# Patient Record
Sex: FEMALE | Race: BLACK OR AFRICAN AMERICAN | HISPANIC OR LATINO | Employment: UNEMPLOYED | URBAN - METROPOLITAN AREA
[De-identification: names, ages, dates, MRNs, and addresses within clinical notes are randomized per-mention and may not be internally consistent; named-entity substitution may affect disease eponyms.]

---
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
## 2023-04-24 ENCOUNTER — HOSPITAL ENCOUNTER (EMERGENCY)
Facility: HOSPITAL | Age: 18
Discharge: HOME | End: 2023-04-24
Attending: EMERGENCY MEDICINE | Admitting: EMERGENCY MEDICINE

## 2023-04-24 VITALS
DIASTOLIC BLOOD PRESSURE: 67 MMHG | OXYGEN SATURATION: 100 % | HEART RATE: 105 BPM | RESPIRATION RATE: 16 BRPM | TEMPERATURE: 98 F | SYSTOLIC BLOOD PRESSURE: 126 MMHG

## 2023-04-24 DIAGNOSIS — A60.00 RECURRENT GENITAL HERPES: Primary | ICD-10-CM

## 2023-04-24 LAB
EXT PREGNANCY TEST URINE: NEGATIVE
EXT. CONTROL: NORMAL

## 2023-04-24 RX ORDER — VALACYCLOVIR HYDROCHLORIDE 500 MG/1
500 TABLET, FILM COATED ORAL ONCE
Status: COMPLETED | OUTPATIENT
Start: 2023-04-24 | End: 2023-04-24

## 2023-04-24 RX ORDER — VALACYCLOVIR HYDROCHLORIDE 500 MG/1
500 TABLET, FILM COATED ORAL 2 TIMES DAILY
Qty: 10 TABLET | Refills: 3 | Status: SHIPPED | OUTPATIENT
Start: 2023-04-24 | End: 2023-04-29

## 2023-04-24 RX ADMIN — VALACYCLOVIR HYDROCHLORIDE 500 MG: 500 TABLET, FILM COATED ORAL at 20:03

## 2023-04-24 NOTE — DISCHARGE INSTRUCTIONS
Take the medicine as prescribed  Tylenol/advil/cool compresses as needed for discomfort  Avoid contact until fully healed and asympatomatic  This can recur so I gave you refills on the prescription

## 2023-04-25 NOTE — ED PROVIDER NOTES
History  Chief Complaint   Patient presents with   • Exposure to STD     States has an outbreak  Poss herpes  Here with significant other who has hx of same     20 yo female with history of genital herpes c/o an outbreak x one day  Pain mild  No associated symptoms  Needs prescription  History provided by:  Patient   used: No    Exposure to STD  Associated symptoms: rash    Associated symptoms: no cough, no diarrhea, no fever and no vomiting        Prior to Admission Medications   Prescriptions Last Dose Informant Patient Reported? Taking?   famotidine (PEPCID) 20 mg tablet   No No   Sig: Take 1 tablet (20 mg total) by mouth 2 (two) times a day for 14 days   ibuprofen (MOTRIN) 600 mg tablet   No No   Sig: Take 1 tablet (600 mg total) by mouth every 6 (six) hours as needed for mild pain   norethindrone-ethinyl estradiol (JUNEL FE 1/20) 1-20 MG-MCG per tablet   Yes No   Sig: Take 1 tablet by mouth daily   ondansetron (ZOFRAN-ODT) 4 mg disintegrating tablet   No No   Sig: Take 1 tablet (4 mg total) by mouth every 6 (six) hours as needed for nausea for up to 15 doses   polyethylene glycol (GLYCOLAX) 17 GM/SCOOP powder   No No   Sig: Take 17 g by mouth daily Use as directed   Patient not taking: Reported on 11/29/2021    polyethylene glycol (MIRALAX) 17 g packet   No No   Sig: Take 17 g by mouth daily for 7 days      Facility-Administered Medications: None       Past Medical History:   Diagnosis Date   • Abnormal body odor    • Constipation    • Dysmenorrhea    • Hirsutism    • Ovarian cyst        History reviewed  No pertinent surgical history  Family History   Problem Relation Age of Onset   • No Known Problems Mother      I have reviewed and agree with the history as documented      E-Cigarette/Vaping   • E-Cigarette Use Never User      E-Cigarette/Vaping Substances     Social History     Tobacco Use   • Smoking status: Never   • Smokeless tobacco: Never   Vaping Use   • Vaping Use: Never used   Substance Use Topics   • Alcohol use: Not Currently   • Drug use: Not Currently     Types: Marijuana       Review of Systems   Constitutional: Negative for fever  Respiratory: Negative for cough  Gastrointestinal: Negative for diarrhea and vomiting  Skin: Positive for rash  Physical Exam  Physical Exam  Vitals and nursing note reviewed  Constitutional:       General: She is not in acute distress  Appearance: She is not ill-appearing  Pulmonary:      Effort: Pulmonary effort is normal  No respiratory distress  Musculoskeletal:      Cervical back: Normal range of motion and neck supple  Neurological:      General: No focal deficit present  Mental Status: She is alert and oriented to person, place, and time  Psychiatric:         Mood and Affect: Mood normal          Behavior: Behavior normal          Vital Signs  ED Triage Vitals [04/24/23 1900]   Temperature Pulse Respirations Blood Pressure SpO2   98 °F (36 7 °C) 105 16 126/67 100 %      Temp Source Heart Rate Source Patient Position - Orthostatic VS BP Location FiO2 (%)   Tympanic Monitor Sitting Right arm --      Pain Score       3           Vitals:    04/24/23 1900   BP: 126/67   Pulse: 105   Patient Position - Orthostatic VS: Sitting         Visual Acuity      ED Medications  Medications   valACYclovir (VALTREX) tablet 500 mg (500 mg Oral Given 4/2003)       Diagnostic Studies  Results Reviewed     Procedure Component Value Units Date/Time    POCT pregnancy, urine [[id number]]  (Normal) Resulted: 04/24/23 1959    Lab Status: Final result Updated: 04/24/23 1959     EXT Preg Test, Ur Negative     Control Valid                 No orders to display              Procedures  Procedures         ED Course         CRAFFT    Flowsheet Row Most Recent Value   CRAFFT Initial Screen: During the past 12 months, did you:    1  Drink any alcohol (more than a few sips)? No Filed at: 04/24/2023 1904   2   Smoke any marijuana or hashish "No Filed at: 04/24/2023 1904   3  Use anything else to get high? (\"anything else\" includes illegal drugs, over the counter and prescription drugs, and things that you sniff or 'simon')? No Filed at: 04/24/2023 1904                                          Medical Decision Making  Note: pt  Did not feel I needed to examine genital area  External records reviewed and confirmed pt  And her partner do have documented history of herpes  Will treat with valtrex  Advised avoid contact, tylenol/advil/cool compresses prn  Amount and/or Complexity of Data Reviewed  Labs: ordered  Risk  Prescription drug management  Disposition  Final diagnoses:   Recurrent genital herpes     Time reflects when diagnosis was documented in both MDM as applicable and the Disposition within this note     Time User Action Codes Description Comment    5/07/5862  0:59 PM Pat Farooqs A Add [U16 49] Recurrent genital herpes       ED Disposition     ED Disposition   Discharge    Condition   Stable    Date/Time   Mon Apr 24, 2023  7:57 PM    Comment   Rubio Broderick discharge to home/self care  Follow-up Information     Follow up With Specialties Details Why Elen Salcido [address], [address] Nurse Practitioner  As needed [address] [place], [place] [id number]            Patient's Medications   Discharge Prescriptions    VALACYCLOVIR (VALTREX) 500 MG TABLET    Take 1 tablet (500 mg total) by mouth 2 (two) times a day for 5 days       Start Date: 4/24/2023 End Date: 4/29/2023       Order Dose: 500 mg       Quantity: 10 tablet    Refills: 3       No discharge procedures on file      PDMP Review     None          ED Provider  Electronically Signed by           Noreen Gonzalez MD  00/89/46 3465    "

## 2023-05-10 ENCOUNTER — TELEPHONE (OUTPATIENT)
Dept: INTERNAL MEDICINE CLINIC | Facility: CLINIC | Age: 18
End: 2023-05-10

## 2023-05-10 NOTE — TELEPHONE ENCOUNTER
Pt called asking for a letter to certify who she is. Stated she needs it for social security. Stated she needs it signed. I did the note as per our conversation.

## 2023-09-09 ENCOUNTER — APPOINTMENT (EMERGENCY)
Dept: ULTRASOUND IMAGING | Facility: HOSPITAL | Age: 18
End: 2023-09-09
Payer: COMMERCIAL

## 2023-09-09 ENCOUNTER — HOSPITAL ENCOUNTER (EMERGENCY)
Facility: HOSPITAL | Age: 18
Discharge: HOME | End: 2023-09-09
Attending: EMERGENCY MEDICINE
Payer: COMMERCIAL

## 2023-09-09 VITALS
TEMPERATURE: 98.8 F | RESPIRATION RATE: 16 BRPM | SYSTOLIC BLOOD PRESSURE: 124 MMHG | HEART RATE: 71 BPM | DIASTOLIC BLOOD PRESSURE: 78 MMHG | OXYGEN SATURATION: 100 %

## 2023-09-09 DIAGNOSIS — O20.0 THREATENED MISCARRIAGE: Primary | ICD-10-CM

## 2023-09-09 DIAGNOSIS — E87.6 HYPOKALEMIA: ICD-10-CM

## 2023-09-09 DIAGNOSIS — E83.42 HYPOMAGNESEMIA: ICD-10-CM

## 2023-09-09 LAB
ABO GROUP BLD: NORMAL
ALBUMIN SERPL BCP-MCNC: 4.4 G/DL (ref 3.5–5)
ALP SERPL-CCNC: 62 U/L (ref 34–104)
ALT SERPL W P-5'-P-CCNC: 12 U/L (ref 7–52)
ANION GAP SERPL CALCULATED.3IONS-SCNC: 7 MMOL/L
AST SERPL W P-5'-P-CCNC: 19 U/L (ref 13–39)
B-HCG SERPL-ACNC: ABNORMAL MIU/ML (ref 0–5)
BASOPHILS # BLD AUTO: 0.01 THOUSANDS/ÂΜL (ref 0–0.1)
BASOPHILS NFR BLD AUTO: 0 % (ref 0–1)
BILIRUB SERPL-MCNC: 0.45 MG/DL (ref 0.2–1)
BILIRUB UR QL STRIP: NEGATIVE
BLD GP AB SCN SERPL QL: NEGATIVE
BUN SERPL-MCNC: 7 MG/DL (ref 5–25)
CALCIUM SERPL-MCNC: 9.2 MG/DL (ref 8.4–10.2)
CHLORIDE SERPL-SCNC: 105 MMOL/L (ref 96–108)
CLARITY UR: CLEAR
CO2 SERPL-SCNC: 25 MMOL/L (ref 21–32)
COLOR UR: YELLOW
CREAT SERPL-MCNC: 0.53 MG/DL (ref 0.6–1.3)
EOSINOPHIL # BLD AUTO: 0.03 THOUSAND/ÂΜL (ref 0–0.61)
EOSINOPHIL NFR BLD AUTO: 1 % (ref 0–6)
ERYTHROCYTE [DISTWIDTH] IN BLOOD BY AUTOMATED COUNT: 11.3 % (ref 11.6–15.1)
GFR SERPL CREATININE-BSD FRML MDRD: 138 ML/MIN/1.73SQ M
GLUCOSE SERPL-MCNC: 84 MG/DL (ref 65–140)
GLUCOSE UR STRIP-MCNC: NEGATIVE MG/DL
HCT VFR BLD AUTO: 30.9 % (ref 34.8–46.1)
HGB BLD-MCNC: 10.7 G/DL (ref 11.5–15.4)
HGB UR QL STRIP.AUTO: NEGATIVE
IMM GRANULOCYTES # BLD AUTO: 0.02 THOUSAND/UL (ref 0–0.2)
IMM GRANULOCYTES NFR BLD AUTO: 0 % (ref 0–2)
KETONES UR STRIP-MCNC: ABNORMAL MG/DL
LEUKOCYTE ESTERASE UR QL STRIP: NEGATIVE
LYMPHOCYTES # BLD AUTO: 1.21 THOUSANDS/ÂΜL (ref 0.6–4.47)
LYMPHOCYTES NFR BLD AUTO: 25 % (ref 14–44)
MAGNESIUM SERPL-MCNC: 1.8 MG/DL (ref 1.9–2.7)
MCH RBC QN AUTO: 29.6 PG (ref 26.8–34.3)
MCHC RBC AUTO-ENTMCNC: 34.6 G/DL (ref 31.4–37.4)
MCV RBC AUTO: 86 FL (ref 82–98)
MONOCYTES # BLD AUTO: 0.45 THOUSAND/ÂΜL (ref 0.17–1.22)
MONOCYTES NFR BLD AUTO: 9 % (ref 4–12)
NEUTROPHILS # BLD AUTO: 3.06 THOUSANDS/ÂΜL (ref 1.85–7.62)
NEUTS SEG NFR BLD AUTO: 65 % (ref 43–75)
NITRITE UR QL STRIP: NEGATIVE
NRBC BLD AUTO-RTO: 0 /100 WBCS
PH UR STRIP.AUTO: 6 [PH]
PLATELET # BLD AUTO: 349 THOUSANDS/UL (ref 149–390)
PMV BLD AUTO: 9 FL (ref 8.9–12.7)
POTASSIUM SERPL-SCNC: 3.2 MMOL/L (ref 3.5–5.3)
PROT SERPL-MCNC: 7.2 G/DL (ref 6.4–8.4)
PROT UR STRIP-MCNC: NEGATIVE MG/DL
RBC # BLD AUTO: 3.61 MILLION/UL (ref 3.81–5.12)
RH BLD: POSITIVE
SODIUM SERPL-SCNC: 137 MMOL/L (ref 135–147)
SP GR UR STRIP.AUTO: >=1.03 (ref 1–1.03)
SPECIMEN EXPIRATION DATE: NORMAL
UROBILINOGEN UR QL STRIP.AUTO: 0.2 E.U./DL
WBC # BLD AUTO: 4.78 THOUSAND/UL (ref 4.31–10.16)

## 2023-09-09 PROCEDURE — 85025 COMPLETE CBC W/AUTO DIFF WBC: CPT | Performed by: EMERGENCY MEDICINE

## 2023-09-09 PROCEDURE — 84702 CHORIONIC GONADOTROPIN TEST: CPT | Performed by: EMERGENCY MEDICINE

## 2023-09-09 PROCEDURE — 99285 EMERGENCY DEPT VISIT HI MDM: CPT | Performed by: EMERGENCY MEDICINE

## 2023-09-09 PROCEDURE — 86900 BLOOD TYPING SEROLOGIC ABO: CPT | Performed by: EMERGENCY MEDICINE

## 2023-09-09 PROCEDURE — 80053 COMPREHEN METABOLIC PANEL: CPT | Performed by: EMERGENCY MEDICINE

## 2023-09-09 PROCEDURE — 99284 EMERGENCY DEPT VISIT MOD MDM: CPT

## 2023-09-09 PROCEDURE — 76801 OB US < 14 WKS SINGLE FETUS: CPT

## 2023-09-09 PROCEDURE — 36415 COLL VENOUS BLD VENIPUNCTURE: CPT | Performed by: EMERGENCY MEDICINE

## 2023-09-09 PROCEDURE — 86901 BLOOD TYPING SEROLOGIC RH(D): CPT | Performed by: EMERGENCY MEDICINE

## 2023-09-09 PROCEDURE — 81003 URINALYSIS AUTO W/O SCOPE: CPT | Performed by: EMERGENCY MEDICINE

## 2023-09-09 PROCEDURE — 83735 ASSAY OF MAGNESIUM: CPT | Performed by: EMERGENCY MEDICINE

## 2023-09-09 PROCEDURE — 93005 ELECTROCARDIOGRAM TRACING: CPT

## 2023-09-09 PROCEDURE — 86850 RBC ANTIBODY SCREEN: CPT | Performed by: EMERGENCY MEDICINE

## 2023-09-09 RX ORDER — POTASSIUM CHLORIDE 20 MEQ/1
40 TABLET, EXTENDED RELEASE ORAL ONCE
Status: COMPLETED | OUTPATIENT
Start: 2023-09-09 | End: 2023-09-09

## 2023-09-09 RX ORDER — LANOLIN ALCOHOL/MO/W.PET/CERES
400 CREAM (GRAM) TOPICAL ONCE
Status: COMPLETED | OUTPATIENT
Start: 2023-09-09 | End: 2023-09-09

## 2023-09-09 RX ADMIN — POTASSIUM CHLORIDE 40 MEQ: 1500 TABLET, EXTENDED RELEASE ORAL at 07:59

## 2023-09-09 RX ADMIN — Medication 400 MG: at 07:59

## 2023-09-09 NOTE — ED PROVIDER NOTES
History  Chief Complaint   Patient presents with   • Vaginal Bleeding - Pregnant     Pt presents to the ED c/o vaginal bleeding for 2 days. Pt states that she is 10 weeks pregnant     75-year-old female  presenting for abdominal pain and vaginal bleeding. Patient estimates that she is approximately 9 weeks pregnant based on her LMP. She had 2 positive home pregnancy tests. She has not yet seen OBGYN or had an US performed. She has had lower abdominal cramping for the past 2 months persistently. She has now developed vaginal bleeding for the past 2 days. She reports going through one pad and liner per day. She felt lightheaded this morning prompting her to seek evaluation. She denies any nausea, vomiting, diarrhea, fevers, urinary symptoms. Prior to Admission Medications   Prescriptions Last Dose Informant Patient Reported?  Taking?   famotidine (PEPCID) 20 mg tablet   No No   Sig: Take 1 tablet (20 mg total) by mouth 2 (two) times a day for 14 days   ibuprofen (MOTRIN) 600 mg tablet   No No   Sig: Take 1 tablet (600 mg total) by mouth every 6 (six) hours as needed for mild pain   norethindrone-ethinyl estradiol (JUNEL ) 1-20 MG-MCG per tablet   Yes No   Sig: Take 1 tablet by mouth daily   ondansetron (ZOFRAN-ODT) 4 mg disintegrating tablet   No No   Sig: Take 1 tablet (4 mg total) by mouth every 6 (six) hours as needed for nausea for up to 15 doses   polyethylene glycol (GLYCOLAX) 17 GM/SCOOP powder   No No   Sig: Take 17 g by mouth daily Use as directed   Patient not taking: Reported on 2021    polyethylene glycol (MIRALAX) 17 g packet   No No   Sig: Take 17 g by mouth daily for 7 days   valACYclovir (VALTREX) 500 mg tablet   No No   Sig: Take 1 tablet (500 mg total) by mouth 2 (two) times a day for 5 days      Facility-Administered Medications: None       Past Medical History:   Diagnosis Date   • Abnormal body odor    • Constipation    • Dysmenorrhea    • Hirsutism    • Ovarian cyst History reviewed. No pertinent surgical history. Family History   Problem Relation Age of Onset   • No Known Problems Mother      I have reviewed and agree with the history as documented. E-Cigarette/Vaping   • E-Cigarette Use Never User      E-Cigarette/Vaping Substances     Social History     Tobacco Use   • Smoking status: Never   • Smokeless tobacco: Never   Vaping Use   • Vaping Use: Never used   Substance Use Topics   • Alcohol use: Not Currently   • Drug use: Not Currently     Types: Marijuana       Review of Systems   Constitutional: Negative for chills and fever. Respiratory: Negative for shortness of breath. Cardiovascular: Negative for chest pain. Gastrointestinal: Positive for abdominal pain. Negative for constipation, diarrhea, nausea and vomiting. Genitourinary: Positive for vaginal bleeding. Negative for dysuria, flank pain, frequency and vaginal discharge. Musculoskeletal: Negative for gait problem. Skin: Negative for rash. Neurological: Positive for light-headedness. Negative for syncope and weakness. All other systems reviewed and are negative. Physical Exam  Physical Exam  Vitals and nursing note reviewed. Constitutional:       General: She is not in acute distress. Appearance: She is well-developed. She is not ill-appearing. HENT:      Head: Normocephalic and atraumatic. Nose: Nose normal.      Mouth/Throat:      Mouth: Mucous membranes are moist.   Eyes:      Conjunctiva/sclera: Conjunctivae normal.   Cardiovascular:      Rate and Rhythm: Normal rate and regular rhythm. Heart sounds: No murmur heard. No friction rub. No gallop. Pulmonary:      Effort: Pulmonary effort is normal.      Breath sounds: Normal breath sounds. No wheezing, rhonchi or rales. Abdominal:      General: There is no distension. Palpations: Abdomen is soft. Tenderness: There is no abdominal tenderness.    Musculoskeletal:         General: No swelling or tenderness. Normal range of motion. Cervical back: Normal range of motion and neck supple. Skin:     General: Skin is warm and dry. Coloration: Skin is not pale. Findings: No rash. Neurological:      General: No focal deficit present. Mental Status: She is alert and oriented to person, place, and time. Psychiatric:         Behavior: Behavior normal.         Vital Signs  ED Triage Vitals [09/09/23 0650]   Temperature Pulse Respirations Blood Pressure SpO2   98.8 °F (37.1 °C) (!) 115 16 124/91 100 %      Temp Source Heart Rate Source Patient Position - Orthostatic VS BP Location FiO2 (%)   Oral Monitor Sitting Left arm --      Pain Score       No Pain           Vitals:    09/09/23 0650 09/09/23 1020   BP: 124/91 124/78   Pulse: (!) 115 71   Patient Position - Orthostatic VS: Sitting Sitting         Visual Acuity      ED Medications  Medications   potassium chloride (K-DUR,KLOR-CON) CR tablet 40 mEq (40 mEq Oral Given 9/9/23 0759)   magnesium Oxide (MAG-OX) tablet 400 mg (400 mg Oral Given 9/9/23 0759)       Diagnostic Studies  Results Reviewed     Procedure Component Value Units Date/Time    Quantitative hCG [[id number]]  (Abnormal) Collected: 09/09/23 0719    Lab Status: Final result Specimen: Blood from Arm, Right Updated: 09/09/23 0818     HCG, [place] 10,208 mIU/mL     Narrative:       Expected Ranges:    HCG results between 5 and 25 mIU/mL may be indicative of early pregnancy but should be interpreted in light of the total clinical presentation. HCG can rise to detectable levels in tera and post menopausal women (0-11.6 mIU/mL).      Approximate               Approximate HCG  Gestation age          Concentration ( mIU/mL)  _____________          ______________________   [facility]                      HCG values  0.2-1                       5-50  1-2                           2-3                         100-5000  3-4                         [id number]  4-5 1000-89136  5-6                         [id number]  6-8                         [id number]  8-12                        [id number]      UA w Reflex to Microscopic w Reflex to Culture [[id number]]  (Abnormal) Collected: 09/09/23 0744    Lab Status: Final result Specimen: Urine, Clean Catch Updated: 09/09/23 0750     Color, UA Yellow     Clarity, UA Clear     Specific Gravity, UA >=1.030     pH, UA 6.0     Leukocytes, UA Negative     Nitrite, UA Negative     Protein, UA Negative mg/dl      Glucose, UA Negative mg/dl      Ketones, UA Trace mg/dl      Urobilinogen, UA 0.2 E.U./dl      Bilirubin, UA Negative     Occult Blood, UA Negative    Comprehensive metabolic panel [[id number]]  (Abnormal) Collected: 09/09/23 0719    Lab Status: Final result Specimen: Blood from Arm, Right Updated: 09/09/23 0746     Sodium 137 mmol/L      Potassium 3.2 mmol/L      Chloride 105 mmol/L      CO2 25 mmol/L      ANION GAP 7 mmol/L      BUN 7 mg/dL      Creatinine 0.53 mg/dL      Glucose 84 mg/dL      Calcium 9.2 mg/dL      AST 19 U/L      ALT 12 U/L      Alkaline Phosphatase 62 U/L      Total Protein 7.2 g/dL      Albumin 4.4 g/dL      Total Bilirubin 0.45 mg/dL      eGFR 138 ml/min/1.73sq m     Narrative:      [place] guidelines for Chronic Kidney Disease (CKD):   •  Stage 1 with normal or high GFR (GFR > 90 mL/min/1.73 square meters)  •  Stage 2 Mild CKD (GFR = 60-89 mL/min/1.73 square meters)  •  Stage 3A Moderate CKD (GFR = 45-59 mL/min/1.73 square meters)  •  Stage 3B Moderate CKD (GFR = 30-44 mL/min/1.73 square meters)  •  Stage 4 Severe CKD (GFR = 15-29 mL/min/1.73 square meters)  •  Stage 5 End Stage CKD (GFR <15 mL/min/1.73 square meters)  Note: GFR calculation is accurate only with a steady state creatinine    Magnesium [[id number]]  (Abnormal) Collected: 09/09/23 0719    Lab Status: Final result Specimen: Blood from Arm, Right Updated: 09/09/23 0746     Magnesium 1.8 mg/dL     CBC and differential [[id number]]  (Abnormal) Collected: 09/09/23 0719    Lab Status: Final result Specimen: Blood from Arm, Right Updated: 09/09/23 0726     WBC 4.78 Thousand/uL      RBC 3.61 Million/uL      Hemoglobin 10.7 g/dL      Hematocrit 30.9 %      MCV 86 fL      MCH 29.6 pg      MCHC 34.6 g/dL      RDW 11.3 %      MPV 9.0 fL      Platelets 525 Thousands/uL      nRBC 0 /100 WBCs      Neutrophils Relative 65 %      Immat GRANS % 0 %      Lymphocytes Relative 25 %      Monocytes Relative 9 %      Eosinophils Relative 1 %      Basophils Relative 0 %      Neutrophils Absolute 3.06 Thousands/µL      Immature Grans Absolute 0.02 Thousand/uL      Lymphocytes Absolute 1.21 Thousands/µL      Monocytes Absolute 0.45 Thousand/µL      Eosinophils Absolute 0.03 Thousand/µL      Basophils Absolute 0.01 Thousands/µL                  US OB < 14 weeks with transvaginal   Final Result by Maximino Huynh MD (09/09 1008)      Single live intrauterine gestation at   6  weeks  6   days . MONO of 04/28/2024. Workstation performed: [id number]                    Procedures  Procedures         ED Course  ED Course as of 09/09/23 1244   Sat Sep 09, 2023   0727 CBC and differential(!)   0730 Procedure Note: EKG  Date/Time: 09/09/23 7:19 AM   Interpreted by: Ronaldo Tavera  Indications / Diagnosis: lightheadedness  ECG reviewed by me, the ED Provider: yes   The EKG demonstrates:  Rhythm: rate 98, normal sinus  Intervals: normal intervals  Axis: normal axis  QRS/Blocks: normal QRS  ST Changes: No acute ST Changes, no STD/ELYSIA.    0748 Comprehensive metabolic panel(!)  Mild hypokalemia, will orally replete. 1786 Magnesium(!): 1.8  Will replete.    0750 UA w Reflex to Microscopic w Reflex to Culture(!)   0810 Type and screen  No need for Rhogam   0819 HCG QUANTITATIVE(!): 95,135                                             Medical Decision Making  28-year-old female presenting for evaluation of abdominal pain and vaginal bleeding in pregnancy. Differential diagnoses include but not limited to threatened miscarriage, menstruation, placenta previa, normal pregnancy, ectopic pregnancy, arrhythmia, UTI. Labs stable, mild hypokalemia and hypomagnesemia noted and orally repleted. Elevated hcg consistent with patient history. Blood type not requiring rhogam administration. UA without UTI. US showing single live intrauterine gestation. Patient is otherwise stable for discharge. She has an obgyn that she plans to follow up with. Prescription for repeat beta hcg in 48 hrs provided. Return precautions discussed. Hypokalemia: acute illness or injury  Hypomagnesemia: acute illness or injury  Threatened miscarriage: acute illness or injury  Amount and/or Complexity of Data Reviewed  Labs: ordered. Decision-making details documented in ED Course. Radiology: ordered. ECG/medicine tests: ordered and independent interpretation performed. Decision-making details documented in ED Course. Risk  OTC drugs. Prescription drug management. Disposition  Final diagnoses:   Threatened miscarriage   Hypokalemia   Hypomagnesemia     Time reflects when diagnosis was documented in both MDM as applicable and the Disposition within this note     Time User Action Codes Description Comment    2023 10:14 AM Glorya Root [O20.0] Threatened miscarriage     2023 10:15 AM Naomie Apgar Add [E87.6] Hypokalemia     2023 10:15 AM Naomie Apgar Add [E83.42] Hypomagnesemia       ED Disposition     ED Disposition   Discharge    Condition   Stable    Date/Time   Sat Sep 9, 2023 10:14 AM    Comment   Talita Cummins discharge to home/self care.                Follow-up Information     Follow up With Specialties Details Why [address] [facility], [address] [facility], Nurse Practitioner In 2 days  [address] [facility] [address]  [phone]            Discharge Medication List as of 2023 10:16 AM      CONTINUE these medications which have NOT CHANGED    Details   famotidine (PEPCID) 20 mg tablet Take 1 tablet (20 mg total) by mouth 2 (two) times a day for 14 days, Starting Fri 10/28/2022, Until Fri 11/11/2022, Normal      ibuprofen (MOTRIN) 600 mg tablet Take 1 tablet (600 mg total) by mouth every 6 (six) hours as needed for mild pain, Starting Wed 5/25/2022, Normal      norethindrone-ethinyl estradiol (JUNEL FE 1/20) 1-20 MG-MCG per tablet Take 1 tablet by mouth daily, Starting Tue 9/28/2021, Until Wed 9/28/2022, Historical Med      ondansetron (ZOFRAN-ODT) 4 mg disintegrating tablet Take 1 tablet (4 mg total) by mouth every 6 (six) hours as needed for nausea for up to 15 doses, Starting Fri 10/28/2022, Normal      polyethylene glycol (GLYCOLAX) 17 GM/SCOOP powder Take 17 g by mouth daily Use as directed, Starting u 5/13/2021, Normal      polyethylene glycol (MIRALAX) 17 g packet Take 17 g by mouth daily for 7 days, Starting Fri 10/28/2022, Until Fri 11/4/2022, Normal      valACYclovir (VALTREX) 500 mg tablet Take 1 tablet (500 mg total) by mouth 2 (two) times a day for 5 days, Starting Mon 4/24/2023, Until Sat 4/29/2023, Normal             Outpatient Discharge Orders   hCG, quantitative   Standing Status: Future Standing Exp.  Date: 09/09/24       PDMP Review     None          ED Provider  Electronically Signed by           Bruna Katz MD  09/09/23 9795

## 2023-09-09 NOTE — DISCHARGE INSTRUCTIONS
Follow-up with your OB/GYN. You should obtain the repeat blood work on 9/11/2023 and follow-up the results with your OB/GYN. You can take Tylenol every 6 hours as needed for pain. Please return to the emergency department if you develop worsening symptoms, severe pain, heavy bleeding, uncontrolled vomiting, or anything else concerning to you.

## 2023-09-12 LAB
ATRIAL RATE: 98 BPM
P AXIS: 69 DEGREES
PR INTERVAL: 144 MS
QRS AXIS: 71 DEGREES
QRSD INTERVAL: 79 MS
QT INTERVAL: 337 MS
QTC INTERVAL: 431 MS
T WAVE AXIS: 43 DEGREES
VENTRICULAR RATE: 98 BPM

## 2023-09-12 PROCEDURE — 93010 ELECTROCARDIOGRAM REPORT: CPT | Performed by: INTERNAL MEDICINE

## 2023-09-19 ENCOUNTER — OFFICE VISIT (OUTPATIENT)
Dept: OBGYN CLINIC | Facility: CLINIC | Age: 18
End: 2023-09-19

## 2023-09-19 VITALS
DIASTOLIC BLOOD PRESSURE: 70 MMHG | HEIGHT: 59 IN | SYSTOLIC BLOOD PRESSURE: 110 MMHG | BODY MASS INDEX: 22.54 KG/M2 | WEIGHT: 111.8 LBS

## 2023-09-19 DIAGNOSIS — N91.2 AMENORRHEA: Primary | ICD-10-CM

## 2023-09-19 DIAGNOSIS — Z11.3 SCREENING FOR STDS (SEXUALLY TRANSMITTED DISEASES): ICD-10-CM

## 2023-09-19 LAB — SL AMB POCT URINE HCG: POSITIVE

## 2023-09-19 PROCEDURE — 87591 N.GONORRHOEAE DNA AMP PROB: CPT | Performed by: OBSTETRICS & GYNECOLOGY

## 2023-09-19 PROCEDURE — 87491 CHLMYD TRACH DNA AMP PROBE: CPT | Performed by: OBSTETRICS & GYNECOLOGY

## 2023-09-19 NOTE — PROGRESS NOTES
Assessment/Plan:     There are no diagnoses linked to this encounter. 77-year-old female  Amenorrhea  IUP 8 weeks by Sharla  Desired pregnancy  Plan  GC/CT  Prenatal vitamin daily  Vitamin B6 for nausea  To be seen for OB intake    Subjective:      Patient ID: Nilson Langston is a 21 y.o. female. HPI  22 yo female   lmp 6/4  Here today for pregnancy confirmation   This pregancy is plan   Pregnancy test was pos 8/23    PMH none  PSH none  medcs none  Denies smoke drugs or alcohol     The following portions of the patient's history were reviewed and updated as appropriate: allergies, current medications, past family history, past medical history, past social history, past surgical history and problem list.    Review of Systems   Constitutional: Negative for activity change, appetite change, chills, fatigue and fever. Respiratory: Negative for apnea, cough, chest tightness and shortness of breath. Cardiovascular: Negative for chest pain, palpitations and leg swelling. Gastrointestinal: Positive for nausea. Negative for abdominal pain, constipation, diarrhea and vomiting. Genitourinary: Negative for difficulty urinating, dysuria, flank pain, frequency, hematuria and urgency. Neurological: Negative for dizziness, seizures, syncope, light-headedness, numbness and headaches. Psychiatric/Behavioral: Negative for agitation and confusion. Objective:      /70 (BP Location: Left arm, Patient Position: Sitting, Cuff Size: Adult)   Ht 4' 11" (1.499 m)   Wt 50.7 kg (111 lb 12.8 oz)   LMP 06/04/2023 (Exact Date)   BMI 22.58 kg/m²          Physical Exam  Constitutional:       Appearance: She is well-developed. Abdominal:      General: There is no distension. Palpations: Abdomen is soft. Tenderness: There is no abdominal tenderness. Genitourinary:     Labia:         Right: No rash, tenderness or lesion. Left: No rash, tenderness or lesion.        Vagina: No signs of injury. No vaginal discharge, erythema or tenderness. Cervix: No cervical motion tenderness, discharge or friability. Adnexa:         Right: No mass, tenderness or fullness. Left: No mass, tenderness or fullness. Neurological:      Mental Status: She is alert and oriented to person, place, and time.    Psychiatric:         Behavior: Behavior normal.

## 2023-09-20 LAB
C TRACH DNA SPEC QL NAA+PROBE: NEGATIVE
N GONORRHOEA DNA SPEC QL NAA+PROBE: NEGATIVE

## 2023-09-21 ENCOUNTER — INITIAL PRENATAL (OUTPATIENT)
Dept: OBGYN CLINIC | Facility: CLINIC | Age: 18
End: 2023-09-21

## 2023-09-21 VITALS
BODY MASS INDEX: 22.82 KG/M2 | DIASTOLIC BLOOD PRESSURE: 62 MMHG | HEIGHT: 59 IN | SYSTOLIC BLOOD PRESSURE: 100 MMHG | WEIGHT: 113.2 LBS

## 2023-09-21 DIAGNOSIS — Z36.9 ANTENATAL SCREENING ENCOUNTER: Primary | ICD-10-CM

## 2023-09-21 DIAGNOSIS — Z31.430 ENCOUNTER OF FEMALE FOR TESTING FOR GENETIC DISEASE CARRIER STATUS FOR PROCREATIVE MANAGEMENT: ICD-10-CM

## 2023-09-21 NOTE — PROGRESS NOTES
.OB Intake    Patient presents for OB intake interview. Accompanied by:  FOB     pregnancy, FOB involved and supportive.     Hx of  delivery prior to 36 weeks 6 days: NO  Hx of hypertension:  No  Patient's last menstrual period was 2023 (exact date). Estimated Date of Delivery: 2023  Signs and Symptoms of pregnancy:  - No swelling , some dizziness, no visual problems, has nausea with no vomiting   - Constipation: yes  - Headaches: occasional  - Cramping/spotting: NO  - PICA cravings: No  - Cats:NO  - Diabetes:   • History of gestational diabetes : No  • BMI >35  : No, weight 113.2 lb, pre pregnancy weight 110 lb   • Advance maternal age >35 : No  • First degree relative with type 2 diabetes : No  • History of PCOS : No  • Current metformin use : No  • Prior history of macrosomia or LGA : No    Prenatal labs including: CBC,ABO, Rubella, Hepatitis, RPR,HIV, Cystic fibrosis and spinal muscular atrophy carrier screen, varicella, hemoglobin electrophoresis  Last Pap:   Never had one   Tdap - counseled to be given after 27 weeks  Influenza vaccine discussed and information sheet given. Vaccinated: No  COVId Vaccine :YES  Hx of MRSA: No  Dental visit with last 6 months - no, recommendations discussed. Interview education:  Ankush Flores Pregnancy Essentials booklet reviewed and explained. Handouts given at today's visit.    Ankush Flores Baby & me phone application guide   [facility] Baby & Me support center   Ankush Flores Maternal Fetal Medicine        Sequential screening pamphlet                   Cystic fibrosis information    [facility]: yes  ONAF form submitted: yes    Kain activated: YES    Interview done by : Andreea Whitmore LPN

## 2023-09-22 ENCOUNTER — TELEPHONE (OUTPATIENT)
Dept: PERINATAL CARE | Facility: OTHER | Age: 18
End: 2023-09-22

## 2023-09-22 NOTE — TELEPHONE ENCOUNTER
Called patient to schedule MFM appointment, based on referral issued to Maternal Fetal Medicine by [facility] office.       Left voicemail requesting patient to call back and schedule appointment, with office number for return call [phone] or [phone]

## 2023-09-26 ENCOUNTER — TELEPHONE (OUTPATIENT)
Dept: PERINATAL CARE | Facility: OTHER | Age: 18
End: 2023-09-26

## 2023-09-26 NOTE — TELEPHONE ENCOUNTER
Called patient to schedule MFM appointment, based on referral issued to Maternal Fetal Medicine by OB office.  Left voicemail requesting patient to call back and schedule appointment, with office number for return call [phone].

## 2023-10-06 ENCOUNTER — ROUTINE PRENATAL (OUTPATIENT)
Dept: OBGYN CLINIC | Facility: CLINIC | Age: 18
End: 2023-10-06
Payer: COMMERCIAL

## 2023-10-06 VITALS
DIASTOLIC BLOOD PRESSURE: 62 MMHG | BODY MASS INDEX: 23.39 KG/M2 | SYSTOLIC BLOOD PRESSURE: 108 MMHG | HEIGHT: 59 IN | WEIGHT: 116 LBS

## 2023-10-06 DIAGNOSIS — Z34.02 ENCOUNTER FOR SUPERVISION OF NORMAL FIRST PREGNANCY IN SECOND TRIMESTER: Primary | ICD-10-CM

## 2023-10-06 DIAGNOSIS — Z36.9 ENCOUNTER FOR ANTENATAL SCREENING: ICD-10-CM

## 2023-10-06 PROBLEM — Z23 NEED FOR MENINGOCOCCAL VACCINATION: Status: RESOLVED | Noted: 2021-11-29 | Resolved: 2023-10-06

## 2023-10-06 LAB
SL AMB  POCT GLUCOSE, UA: NORMAL
SL AMB LEUKOCYTE ESTERASE,UA: NORMAL
SL AMB POCT NITRITE,UA: NORMAL
SL AMB POCT URINE PROTEIN: NORMAL

## 2023-10-06 PROCEDURE — 81002 URINALYSIS NONAUTO W/O SCOPE: CPT | Performed by: PHYSICIAN ASSISTANT

## 2023-10-06 PROCEDURE — 99213 OFFICE O/P EST LOW 20 MIN: CPT | Performed by: PHYSICIAN ASSISTANT

## 2023-10-06 NOTE — PROGRESS NOTES
Assessment      Pregnancy 13 and 3/7 weeks     Plan     Problem list reviewed and updated. Labs reviewed. Genetic screening tests discussed: requested. Role of ultrasound in pregnancy discussed; fetal survey: ordered. Amniocentesis discussed: not indicated. Follow up in 4 weeks. 50% of 15 minute visit spent on counseling and coordination of care. . LMP corrected in Epic; [address] dimas. F/u with MFM as planned. Go for labs. Continue Zofran as needed. Continue PNV. Can take Tums, Pepcid, or Prilosec if needed. Will need HSV prophylaxis at 36 wks. Recommended flu and updated Covid vaccines. Stay well hydrated. Vika White is a 21 y.o. female being seen today for her obstetrical visit. She is at 13w3d gestation. Patient reports nausea, no bleeding, no contractions, no cramping and no leaking. Fetal movement: not appreciated yet. Patient states she is doing well overall. Need to correct LMP - it was 23, not 23 as previously reported. Has U/S with MFM on 10/25. Has not gone for PN labs. Nausea is improved; has not needed Zofran. Taking PNV. Has history of genital HSV; only takes Valtrex for outbreaks. Also has history of GERD. Denies HA, reflux, abdominal pain, and swelling. Urine dip showed trace protein. Menstrual History:  OB History        1    Para   0    Term   0       0    AB   0    Living   0       SAB   0    IAB   0    Ectopic   0    Multiple   0    Live Births   0                Menarche age: N/A  Patient's last menstrual period was 2023. The following portions of the patient's history were reviewed and updated as appropriate: allergies, current medications, past family history, past medical history, past social history, past surgical history and problem list.    Review of Systems  Pertinent items are noted in HPI.      Objective     /62 (BP Location: Left arm, Patient Position: Sitting, Cuff Size: Adult)   Ht 4' 11" (1.499 m)   Wt 52.6 kg (116 lb)   LMP 07/04/2023   BMI 23.43 kg/m²   Uterine Size: size equals dates   Pelvic Exam:

## 2023-10-06 NOTE — PATIENT INSTRUCTIONS
F/u with MFM as planned. Go for labs. Continue Zofran as needed. Continue PNV. Can take Tums, Pepcid, or Prilosec if needed. Recommended flu and updated Covid vaccines. Stay well hydrated.

## 2023-10-11 ENCOUNTER — APPOINTMENT (OUTPATIENT)
Dept: LAB | Facility: HOSPITAL | Age: 18
End: 2023-10-11
Attending: OBSTETRICS & GYNECOLOGY
Payer: COMMERCIAL

## 2023-10-11 DIAGNOSIS — Z31.430 ENCOUNTER OF FEMALE FOR TESTING FOR GENETIC DISEASE CARRIER STATUS FOR PROCREATIVE MANAGEMENT: ICD-10-CM

## 2023-10-11 DIAGNOSIS — Z36.9 ANTENATAL SCREENING ENCOUNTER: ICD-10-CM

## 2023-10-11 DIAGNOSIS — O20.0 THREATENED MISCARRIAGE: ICD-10-CM

## 2023-10-11 LAB
ABO GROUP BLD: NORMAL
B-HCG SERPL-ACNC: ABNORMAL MIU/ML (ref 0–5)
BASOPHILS # BLD AUTO: 0.01 THOUSANDS/ÂΜL (ref 0–0.1)
BASOPHILS NFR BLD AUTO: 0 % (ref 0–1)
BLD GP AB SCN SERPL QL: NEGATIVE
EOSINOPHIL # BLD AUTO: 0.03 THOUSAND/ÂΜL (ref 0–0.61)
EOSINOPHIL NFR BLD AUTO: 1 % (ref 0–6)
ERYTHROCYTE [DISTWIDTH] IN BLOOD BY AUTOMATED COUNT: 11.7 % (ref 11.6–15.1)
HBV SURFACE AB SER-ACNC: 3.12 MIU/ML
HBV SURFACE AG SER QL: NORMAL
HCT VFR BLD AUTO: 31.5 % (ref 34.8–46.1)
HCV AB SER QL: NORMAL
HGB BLD-MCNC: 11.2 G/DL (ref 11.5–15.4)
HIV 1+2 AB+HIV1 P24 AG SERPL QL IA: NORMAL
HIV 2 AB SERPL QL IA: NORMAL
HIV1 AB SERPL QL IA: NORMAL
HIV1 P24 AG SERPL QL IA: NORMAL
IMM GRANULOCYTES # BLD AUTO: 0.01 THOUSAND/UL (ref 0–0.2)
IMM GRANULOCYTES NFR BLD AUTO: 0 % (ref 0–2)
LYMPHOCYTES # BLD AUTO: 1.07 THOUSANDS/ÂΜL (ref 0.6–4.47)
LYMPHOCYTES NFR BLD AUTO: 23 % (ref 14–44)
MCH RBC QN AUTO: 30.2 PG (ref 26.8–34.3)
MCHC RBC AUTO-ENTMCNC: 35.6 G/DL (ref 31.4–37.4)
MCV RBC AUTO: 85 FL (ref 82–98)
MONOCYTES # BLD AUTO: 0.33 THOUSAND/ÂΜL (ref 0.17–1.22)
MONOCYTES NFR BLD AUTO: 7 % (ref 4–12)
NEUTROPHILS # BLD AUTO: 3.29 THOUSANDS/ÂΜL (ref 1.85–7.62)
NEUTS SEG NFR BLD AUTO: 69 % (ref 43–75)
NRBC BLD AUTO-RTO: 0 /100 WBCS
PLATELET # BLD AUTO: 342 THOUSANDS/UL (ref 149–390)
PMV BLD AUTO: 9.5 FL (ref 8.9–12.7)
RBC # BLD AUTO: 3.71 MILLION/UL (ref 3.81–5.12)
RH BLD: POSITIVE
RUBV IGG SERPL IA-ACNC: 73.3 IU/ML
SPECIMEN EXPIRATION DATE: NORMAL
TREPONEMA PALLIDUM IGG+IGM AB [PRESENCE] IN SERUM OR PLASMA BY IMMUNOASSAY: NORMAL
VZV IGG SER QL IA: NORMAL
WBC # BLD AUTO: 4.74 THOUSAND/UL (ref 4.31–10.16)

## 2023-10-11 PROCEDURE — 86787 VARICELLA-ZOSTER ANTIBODY: CPT

## 2023-10-11 PROCEDURE — 86706 HEP B SURFACE ANTIBODY: CPT

## 2023-10-11 PROCEDURE — 86780 TREPONEMA PALLIDUM: CPT

## 2023-10-11 PROCEDURE — 87389 HIV-1 AG W/HIV-1&-2 AB AG IA: CPT

## 2023-10-11 PROCEDURE — 86901 BLOOD TYPING SEROLOGIC RH(D): CPT

## 2023-10-11 PROCEDURE — 83020 HEMOGLOBIN ELECTROPHORESIS: CPT

## 2023-10-11 PROCEDURE — 86803 HEPATITIS C AB TEST: CPT

## 2023-10-11 PROCEDURE — 84702 CHORIONIC GONADOTROPIN TEST: CPT

## 2023-10-11 PROCEDURE — 86850 RBC ANTIBODY SCREEN: CPT

## 2023-10-11 PROCEDURE — 36415 COLL VENOUS BLD VENIPUNCTURE: CPT

## 2023-10-11 PROCEDURE — 86900 BLOOD TYPING SEROLOGIC ABO: CPT

## 2023-10-11 PROCEDURE — 85025 COMPLETE CBC W/AUTO DIFF WBC: CPT

## 2023-10-11 PROCEDURE — 87340 HEPATITIS B SURFACE AG IA: CPT

## 2023-10-11 PROCEDURE — 86762 RUBELLA ANTIBODY: CPT

## 2023-10-12 ENCOUNTER — APPOINTMENT (OUTPATIENT)
Dept: LAB | Facility: HOSPITAL | Age: 18
End: 2023-10-12
Payer: COMMERCIAL

## 2023-10-12 LAB
BILIRUB UR QL STRIP: NEGATIVE
CLARITY UR: CLEAR
COLOR UR: YELLOW
GLUCOSE UR STRIP-MCNC: NEGATIVE MG/DL
HGB UR QL STRIP.AUTO: NEGATIVE
KETONES UR STRIP-MCNC: NEGATIVE MG/DL
LEUKOCYTE ESTERASE UR QL STRIP: NEGATIVE
NITRITE UR QL STRIP: NEGATIVE
PH UR STRIP.AUTO: 8 [PH]
PROT UR STRIP-MCNC: NEGATIVE MG/DL
SP GR UR STRIP.AUTO: 1.01 (ref 1–1.03)
UROBILINOGEN UR QL STRIP.AUTO: 0.2 E.U./DL

## 2023-10-12 PROCEDURE — 87086 URINE CULTURE/COLONY COUNT: CPT

## 2023-10-13 LAB
BACTERIA UR CULT: NORMAL
HGB A MFR BLD: 2.8 % (ref 1.8–3.2)
HGB A MFR BLD: 97.2 % (ref 96.4–98.8)
HGB F MFR BLD: 0 % (ref 0–2)
HGB FRACT BLD-IMP: NORMAL
HGB S MFR BLD: 0 %

## 2023-10-18 LAB
CF COMMENT: NORMAL
CFTR MUT ANL BLD/T: NORMAL

## 2023-10-19 ENCOUNTER — TELEPHONE (OUTPATIENT)
Facility: HOSPITAL | Age: 18
End: 2023-10-19

## 2023-10-19 NOTE — TELEPHONE ENCOUNTER
Attempted to call PT to explain having to cancel appt on 10/25 due to MONO changing. Unable to reach PT, or leave a message. Sent Hitch message.

## 2023-10-23 ENCOUNTER — TELEPHONE (OUTPATIENT)
Facility: HOSPITAL | Age: 18
End: 2023-10-23

## 2023-10-23 NOTE — TELEPHONE ENCOUNTER
Called patient to schedule MFM appointment, based on referral issued to Maternal Fetal Medicine by [facility] office. Left voicemail requesting patient to call back and schedule appointment, with office number for return call [phone]. Had to r/s NT appt scheduled for 10/25 due to change in MONO. Called 2x to r/s.

## 2023-10-24 ENCOUNTER — TELEPHONE (OUTPATIENT)
Facility: HOSPITAL | Age: 18
End: 2023-10-24

## 2023-10-24 ENCOUNTER — TELEPHONE (OUTPATIENT)
Dept: PERINATAL CARE | Facility: OTHER | Age: 18
End: 2023-10-24

## 2023-10-24 NOTE — TELEPHONE ENCOUNTER
Called patient to schedule MFM appointment, based on referral issued to Maternal Fetal Medicine by [facility] office. Left voicemail requesting patient to call back and schedule appointment, with office number for return call [phone].

## 2023-10-24 NOTE — TELEPHONE ENCOUNTER
Called patient to schedule MFM appointment, based on referral issued to Maternal Fetal Medicine by [facility] office. Our office has tried multiple times to contact this patient to schedule an appointment as indicated in the referral. Our office has tried to contact this patient on 9/22, 9/26, 10/19, 10/23 and 10/24 but the patient has not returned any phone calls to schedule an appointment with MFM.

## 2023-10-25 ENCOUNTER — DOCUMENTATION (OUTPATIENT)
Facility: HOSPITAL | Age: 18
End: 2023-10-25

## 2023-10-25 NOTE — PROGRESS NOTES
Pt arrived for NT appointment. However, her appointment had been cancelled due to change in MONO (see previous chart encounters), and pt has missed timeframe for NT appointment. Pt states she did not receive [facility] message sent or phone calls about this cancellation. Scheduled pt for appt with genetic counseling, and scheduled pt for her Detailed Ultrasound. Encouraged pt to turn on notifications for Aigou carolann and gave her the office number [phone].

## 2023-10-26 ENCOUNTER — TELEMEDICINE (OUTPATIENT)
Dept: PERINATAL CARE | Facility: CLINIC | Age: 18
End: 2023-10-26

## 2023-10-26 DIAGNOSIS — Z36.0 ENCOUNTER FOR ANTENATAL SCREENING FOR CHROMOSOMAL ANOMALIES: Primary | ICD-10-CM

## 2023-10-26 NOTE — PROGRESS NOTES
Patient attended the Genetic Screening Education Session via [employer]. The group reviewed basic chromosome information and the increasing risk for aneuploidy based on patient age. Copy Number variants (microdeletions/duplications) were also reviewed with a general population risk of 0.4% in any pregnancy. The group discussed the options for prenatal screening and diagnosis for chromosomal abnormalities. The benefits and limitations of fetal anatomy ultrasound at 20 weeks gestation were reviewed. Quad screening consists of second trimester biochemical analysis. Results provide a numerical risk for Down syndrome, Trisomy 18, and open neural tube defects. Group attendees were instructed to contact their OB office if they desired Quad screen. Cell-free fetal DNA (NIPT) screening analyzes placental DNA in maternal serum and can assess the risk for Down syndrome, trisomy 25, trisomy 15, and sex chromosome anomalies. Results report as screen negative or screen positive, and fetal sex information is provided. The possibility of no-result and increased risk result was addressed. Maternal serum AFP screening is recommended at 16-18 weeks to screen for open neural tube defects. The benefits and limitations of these screens, including detection rates and false positive rates, were reviewed. Prenatal diagnostic testing is available via amniocentesis. The timing, risks (including their associated risks of miscarriage) and benefits were reviewed. Lastly, we reviewed that all pregnancies have a 3-6% risk of birth defect, genetic condition, or intellectual disability regardless of age or personal/family history. There is no available testing to rule out all conditions. We reviewed potential costs associated with cell-free fetal DNA (NIPT) screen and provided resources, including information to check out of pocket cost with their insurance.     This patient was the only class attendee, so we reviewed her testing decisions during the call. Patient elected [facility] NIPS. A nurse visit for [facility] blood draw was scheduled for 10/27/23 at our [facility] location at 3:45 PM. Discussed that with her insurance we would not expect her to receive a bill. Email address was confirmed. Reviewed that the NIPT can screen for sex chromosome abnormalities and the fetal sex which the patient elected to learn. Discussed that the results take about 7-10 days and will be available in her MyChart to review. Patient expressed verbal understanding and had no questions. She was encouraged to call the genetic counseling office with any concerns.

## 2023-10-27 ENCOUNTER — CLINICAL SUPPORT (OUTPATIENT)
Facility: HOSPITAL | Age: 18
End: 2023-10-27

## 2023-10-27 DIAGNOSIS — Z36.9 UNSPECIFIED ANTENATAL SCREENING: Primary | ICD-10-CM

## 2023-10-27 NOTE — PROGRESS NOTES
Patient chose to have [facility] Non-invasive Prenatal Screen with fetal sex. Patient choose billed through insurance. Patient assistance program (PAP) application provided to patient no. PAP sent with specimen No.     Patient given brochure and is aware [facility] will contact their insurance and coordinate coverage. Patient made aware she will receive a text message and e-mail from [employer]. Patient informed text/phone call message will come from area code  "415". Provided [employer] # [phone] and web site at [employer].   "Galax your test online" card with barcode and test tube ID provided to patient. Reviewed [facility] web site states 5-7 business days for results via their portal.   [employer] message will be sent to patient when [facility] receives results /provider reviews. 2 vials of blood drawn from  left  arm by MARIALUISA Leal. Patient tolerated blood draw without difficulty. Specimens labeled with patient identifiers (name, date of birth, specimen collection date), order and specimen were verified with patient, packed and sent via [facility]. FED EX  tracking #  [id number]  Copy of lab order scanned to Epic media. Maternal Fetal Medicine will have results in approximately 7-10 business days and will call patient or notify via [address] [place] [address]. Patient aware viewing lab result online will reveal fetal sex if ordered. Patient verbalized understanding of all instructions and no questions at this time.

## 2023-11-06 ENCOUNTER — ROUTINE PRENATAL (OUTPATIENT)
Dept: OBGYN CLINIC | Facility: CLINIC | Age: 18
End: 2023-11-06
Payer: COMMERCIAL

## 2023-11-06 VITALS
SYSTOLIC BLOOD PRESSURE: 100 MMHG | DIASTOLIC BLOOD PRESSURE: 50 MMHG | BODY MASS INDEX: 24.39 KG/M2 | HEIGHT: 59 IN | WEIGHT: 121 LBS

## 2023-11-06 DIAGNOSIS — Z3A.17 17 WEEKS GESTATION OF PREGNANCY: Primary | ICD-10-CM

## 2023-11-06 PROCEDURE — 99213 OFFICE O/P EST LOW 20 MIN: CPT | Performed by: OBSTETRICS & GYNECOLOGY

## 2023-11-06 NOTE — PROGRESS NOTES
The patient is a 41-year-old primigravida who presents at 17 weeks 6 days gestation for routine prenatal exam.  She notices some increasing heartburn and was advised that Tums and Pepcid are considered safe in pregnancy. She denies vaginal bleeding, loss of fluid, or contractions. She has some round ligament pain which is tolerable. She is scheduled for her anatomy scan at 20 weeks. She was given a prescription for alpha fetal protein testing and will have it done in the near future. She should return in 1 month or as needed.

## 2023-11-08 LAB — MISCELLANEOUS LAB TEST RESULT: NORMAL

## 2023-11-30 NOTE — PROGRESS NOTES
Please refer to the [facility] ultrasound report in Ob Procedures for additional information regarding today's visit

## 2023-12-01 ENCOUNTER — ROUTINE PRENATAL (OUTPATIENT)
Facility: HOSPITAL | Age: 18
End: 2023-12-01
Payer: COMMERCIAL

## 2023-12-01 VITALS
BODY MASS INDEX: 25.24 KG/M2 | SYSTOLIC BLOOD PRESSURE: 108 MMHG | DIASTOLIC BLOOD PRESSURE: 50 MMHG | WEIGHT: 125.2 LBS | HEIGHT: 59 IN

## 2023-12-01 DIAGNOSIS — Z3A.18 18 WEEKS GESTATION OF PREGNANCY: ICD-10-CM

## 2023-12-01 DIAGNOSIS — Z36.3 ENCOUNTER FOR ANTENATAL SCREENING FOR MALFORMATIONS: Primary | ICD-10-CM

## 2023-12-01 DIAGNOSIS — O99.342 DEPRESSION COMPLICATING PREGNANCY, ANTEPARTUM, SECOND TRIMESTER: ICD-10-CM

## 2023-12-01 DIAGNOSIS — F41.9 ANXIETY DURING PREGNANCY, ANTEPARTUM, SECOND TRIMESTER: ICD-10-CM

## 2023-12-01 DIAGNOSIS — O99.342 ANXIETY DURING PREGNANCY, ANTEPARTUM, SECOND TRIMESTER: ICD-10-CM

## 2023-12-01 DIAGNOSIS — F32.A DEPRESSION COMPLICATING PREGNANCY, ANTEPARTUM, SECOND TRIMESTER: ICD-10-CM

## 2023-12-01 DIAGNOSIS — Z36.86 ENCOUNTER FOR ANTENATAL SCREENING FOR CERVICAL LENGTH: ICD-10-CM

## 2023-12-01 PROCEDURE — 99243 OFF/OP CNSLTJ NEW/EST LOW 30: CPT | Performed by: OBSTETRICS & GYNECOLOGY

## 2023-12-01 PROCEDURE — 76805 OB US >/= 14 WKS SNGL FETUS: CPT | Performed by: OBSTETRICS & GYNECOLOGY

## 2023-12-01 PROCEDURE — 76817 TRANSVAGINAL US OBSTETRIC: CPT | Performed by: OBSTETRICS & GYNECOLOGY

## 2023-12-01 RX ORDER — ASPIRIN 81 MG/1
162 TABLET, CHEWABLE ORAL
Qty: 180 TABLET | Refills: 0 | Status: SHIPPED | OUTPATIENT
Start: 2023-12-01 | End: 2024-02-29

## 2023-12-01 NOTE — PROGRESS NOTES
Ultrasound Probe Disinfection    A transvaginal ultrasound was performed. Prior to use, disinfection was performed with High Level Disinfection Process ([employer]). Probe serial number A4: [id number] was used.       Salvador Gaines  12/01/23  3:00 PM

## 2023-12-01 NOTE — PROGRESS NOTES
Assessment/Plan:    No problem-specific Assessment & Plan notes found for this encounter. {Assess/PlanSmartLinks:81931}      Subjective:      Patient ID: Tory Age is a 19 y.o. female.     HPI    {Common ambulatory SmartLinks:83169}    Review of Systems      Objective:      LMP 07/04/2023          Physical Exam

## 2023-12-01 NOTE — PROGRESS NOTES
Ultrasound Probe Disinfection    A transvaginal ultrasound was performed. Prior to use, disinfection was performed with High Level Disinfection Process ([employer]). Probe serial number A2: [id number] was used.       Stacey Zuñiga  12/01/23  2:37 PM

## 2023-12-01 NOTE — LETTER
December 5, 2023     Clara Holter[address]  [address] [facility] [address] [facility]    Patient: Mouna Oscar   YOB: 2005   Date of Visit: 12/1/2023       Dear Dr. Tl Mitchell: Thank you for referring Suzanne Valle to me for evaluation. Below are my notes for this consultation. If you have questions, please do not hesitate to call me. I look forward to following your patient along with you.          Sincerely,        Danilo Wood MD        CC: No Recipients    Danilo Wood MD  12/1/2023  3:40 PM  Sign when Signing Visit  Please refer to the [facility] ultrasound report in Ob Procedures for additional information regarding today's visit

## 2023-12-06 ENCOUNTER — ROUTINE PRENATAL (OUTPATIENT)
Dept: OBGYN CLINIC | Facility: CLINIC | Age: 18
End: 2023-12-06
Payer: COMMERCIAL

## 2023-12-06 VITALS
DIASTOLIC BLOOD PRESSURE: 60 MMHG | WEIGHT: 129 LBS | HEIGHT: 59 IN | SYSTOLIC BLOOD PRESSURE: 100 MMHG | BODY MASS INDEX: 26 KG/M2

## 2023-12-06 DIAGNOSIS — Z3A.19 19 WEEKS GESTATION OF PREGNANCY: ICD-10-CM

## 2023-12-06 DIAGNOSIS — Z34.02 PRIMIGRAVIDA IN SECOND TRIMESTER: Primary | ICD-10-CM

## 2023-12-06 PROCEDURE — 99213 OFFICE O/P EST LOW 20 MIN: CPT | Performed by: PHYSICIAN ASSISTANT

## 2023-12-06 NOTE — PROGRESS NOTES
Assessment     Pregnancy 19 and 3/7 weeks     Plan    Routine OB visit doing well overall  Acid reflux/heartburn, reminded patient diet modification, Pepcid or Tums as needed  History of anxiety and depression, currently stable without medications, doing well overall. Will continue to monitor    Blood pressure today 100/60, , good fetal movement appreciated during Doppler exam  Patient advised to leave urine sample and left before doing so  O+ blood type. Patient completed NIPT genetic screening which was low risk. She has not yet completed AFP, counseled patient regarding this blood test and is agreeable to complete this. Recommend she try to complete it within the next few days, before 20-week gestation. Level 2 ultrasound performed  with normal-appearing anatomy, but limited exam.  Follow-up scheduled for 2024. Patient encouraged to continue PNV daily  Stressed hydration with plenty of water, well-balanced diet, exercise as tolerated       Follow up in 3-4 weeks. Chief Complaint   Patient presents with    Routine Prenatal Visit     No problems         Subjective:     Tory Wilcox is a 19 y.o. female being seen today for her obstetrical visit. She is at 19w3d gestation. She is doing well overall and has no new complaints or concerns. She notes pre-existing reflux/heartburn daily, mild, not taking anythign for it. Denies associated abd pain, N/V. Patient reports no bleeding, no contractions, no cramping, no leaking, and denies headaches, blurry vision, dizziness, swelling. Normal urination and bowel movements. . Fetal movement: normal.    She is taking PNV and aspirin daily. Menstrual History:  OB History          1    Para   0    Term   0       0    AB   0    Living   0         SAB   0    IAB   0    Ectopic   0    Multiple   0    Live Births   0                Menarche age: N/A  Patient's last menstrual period was 2023.        The following portions of the patient's history were reviewed and updated as appropriate: allergies, current medications, past family history, past medical history, past social history, past surgical history, and problem list.    Review of Systems  Pertinent items are noted in HPI.      Objective      /60 (BP Location: Left arm, Patient Position: Sitting, Cuff Size: Standard)   Ht 4' 11" (1.499 m)   Wt 58.5 kg (129 lb)   LMP 07/04/2023   BMI 26.05 kg/m²   FHT: 157 BPM   Uterine Size: size equals dates and 20cm

## 2023-12-11 ENCOUNTER — APPOINTMENT (OUTPATIENT)
Dept: LAB | Facility: HOSPITAL | Age: 18
End: 2023-12-11
Payer: COMMERCIAL

## 2023-12-11 DIAGNOSIS — Z3A.17 17 WEEKS GESTATION OF PREGNANCY: ICD-10-CM

## 2023-12-11 PROCEDURE — 36415 COLL VENOUS BLD VENIPUNCTURE: CPT

## 2023-12-11 PROCEDURE — 82105 ALPHA-FETOPROTEIN SERUM: CPT

## 2023-12-13 LAB
2ND TRIMESTER 4 SCREEN SERPL-IMP: NORMAL
AFP ADJ MOM SERPL: 1.24
AFP INTERP AMN-IMP: NORMAL
AFP INTERP SERPL-IMP: NORMAL
AFP INTERP SERPL-IMP: NORMAL
AFP SERPL-MCNC: 136.6 NG/ML
AGE AT DELIVERY: 20.4 YR
GA METHOD: NORMAL
GA: 22.9 WEEKS
IDDM PATIENT QL: NO
MULTIPLE PREGNANCY: NO
NEURAL TUBE DEFECT RISK FETUS: NORMAL %

## 2024-01-02 ENCOUNTER — ULTRASOUND (OUTPATIENT)
Facility: HOSPITAL | Age: 19
End: 2024-01-02
Payer: COMMERCIAL

## 2024-01-02 VITALS
HEIGHT: 59 IN | WEIGHT: 132.72 LBS | BODY MASS INDEX: 26.76 KG/M2 | SYSTOLIC BLOOD PRESSURE: 120 MMHG | HEART RATE: 106 BPM | DIASTOLIC BLOOD PRESSURE: 74 MMHG

## 2024-01-02 DIAGNOSIS — Z36.2 ENCOUNTER FOR FOLLOW-UP ULTRASOUND OF FETAL ANATOMY: ICD-10-CM

## 2024-01-02 DIAGNOSIS — Z3A.23 23 WEEKS GESTATION OF PREGNANCY: Primary | ICD-10-CM

## 2024-01-02 PROCEDURE — 76816 OB US FOLLOW-UP PER FETUS: CPT | Performed by: OBSTETRICS & GYNECOLOGY

## 2024-01-02 NOTE — LETTER
January 2, 2024     Rosario Neri MD  [address] [facility]  1st [address]  [facility] [address]    Patient: Sharonda Kaplan   YOB: 2005   Date of Visit: 1/2/2024       Dear Dr. Neri:    Thank you for referring Sharonda Kaplan to me for evaluation. Below are my notes for this consultation.    If you have questions, please do not hesitate to call me. I look forward to following your patient along with you.         Sincerely,        Eleonora Villanueva MD        CC: No Recipients    Harjinder Nixon MD  1/2/2024  3:10 PM  Sign when Signing Visit  Please refer to the [facility] ultrasound report in Ob Procedures for additional information regarding today's visit

## 2024-01-02 NOTE — PROGRESS NOTES
Please refer to the [facility] ultrasound report in Ob Procedures for additional information regarding today's visit

## 2024-01-03 ENCOUNTER — ROUTINE PRENATAL (OUTPATIENT)
Dept: OBGYN CLINIC | Facility: CLINIC | Age: 19
End: 2024-01-03
Payer: COMMERCIAL

## 2024-01-03 VITALS — SYSTOLIC BLOOD PRESSURE: 118 MMHG | BODY MASS INDEX: 26.86 KG/M2 | WEIGHT: 133 LBS | DIASTOLIC BLOOD PRESSURE: 74 MMHG

## 2024-01-03 DIAGNOSIS — Z3A.23 23 WEEKS GESTATION OF PREGNANCY: Primary | ICD-10-CM

## 2024-01-03 LAB
SL AMB  POCT GLUCOSE, UA: ABNORMAL
SL AMB LEUKOCYTE ESTERASE,UA: ABNORMAL
SL AMB POCT NITRITE,UA: ABNORMAL
SL AMB POCT URINE PROTEIN: ABNORMAL

## 2024-01-03 PROCEDURE — 99213 OFFICE O/P EST LOW 20 MIN: CPT | Performed by: PHYSICIAN ASSISTANT

## 2024-01-03 PROCEDURE — 81002 URINALYSIS NONAUTO W/O SCOPE: CPT | Performed by: PHYSICIAN ASSISTANT

## 2024-01-03 NOTE — PROGRESS NOTES
"Requested Prescriptions   Pending Prescriptions Disp Refills     losartan (COZAAR) 50 MG tablet [Pharmacy Med Name: LOSARTAN POTASSIUM 50MG TABS] 30 tablet 0    Last Written Prescription Date:  4/18/18  Last Fill Quantity: 30,  # refills: 0   Last office visit: 3/13/2018 with prescribing provider:  2/27/18   Future Office Visit:     Sig: TAKE ONE TABLET BY MOUTH EVERY DAY    Angiotensin-II Receptors Failed    5/23/2018  8:42 AM       Failed - Blood pressure under 140/90 in past 12 months    BP Readings from Last 3 Encounters:   04/24/18 144/67   03/13/18 144/68   02/27/18 152/74                Passed - Recent (12 mo) or future (30 days) visit within the authorizing provider's specialty    Patient had office visit in the last 12 months or has a visit in the next 30 days with authorizing provider or within the authorizing provider's specialty.  See \"Patient Info\" tab in inbasket, or \"Choose Columns\" in Meds & Orders section of the refill encounter.           Passed - Patient is age 18 or older       Passed - Normal serum creatinine on file in past 12 months    Recent Labs   Lab Test  04/17/18   1005   CR  1.22            Passed - Normal serum potassium on file in past 12 months    Recent Labs   Lab Test  04/17/18   1005   POTASSIUM  4.3                      " Assessment     Pregnancy 23 and 3/7 weeks     Plan     Routine OB visit doing well overall.  No symptoms or concerns today.  Reflux stable with Tums as needed  History anxiety and depression, denies any worsening symptoms but does admit to some worry regarding pregnancy and having a baby.  This was discussed in detail today with reassurance provided to patient.  She has good family support.  Would highly encourage consideration for hospital tour, pregnancy classes, establish with baby and me program.  Blue folder provided today including pump order information.    History of genital herpes, recent outbreak cleared with Valtrex ordered by her PCP.  Valtrex suppressive therapy recommended at 36 weeks gestation    Blood pressure 118/74, , good fetal movement appreciated.  O+ blood type.  Urine dip trace leuks, trace protein    MFM follow-up for growth scheduled on 3/6    Continue daily PNV, ASA  Stressed hydration drinking plenty of water, well-balanced diet, exercise as tolerated.    Patient to follow-up in about 3 weeks  Will provide her with 28-week lab order at that time.    Chief Complaint   Patient presents with    Routine Prenatal Visit     Denies VB,LOF, headaches, vision changes, edema, cramping or contractions + fetal movement            Subjective     Sharonda Kaplan is a 18 y.o. female being seen today for her obstetrical visit. She is at 23w3d gestation.  She is doing well overall and reports no symptoms or concerns today.  Patient reports no bleeding, no contractions, no cramping, no leaking, and denies headache, blurry vision, dizziness, swelling, abdominal pain, nausea or vomiting.  Normal urination and bowel movement.  Reflux currently controlled taking Tums as needed. . Fetal movement: normal.    Patient reports taking ASA, PNV daily.  Anxiety and depression currently stable, she does have worry about pregnancy and having the baby.  Recent genital herpes outbreak, Completed Valtrex course  prescribed by PCP.  Would recommend suppressive therapy at 36 weeks gestation.    Menstrual History:  OB History          1    Para   0    Term   0       0    AB   0    Living   0         SAB   0    IAB   0    Ectopic   0    Multiple   0    Live Births   0                Menarche age: N/A  Patient's last menstrual period was 2023.       The following portions of the patient's history were reviewed and updated as appropriate: allergies, current medications, past family history, past medical history, past social history, past surgical history, and problem list.    Review of Systems  Pertinent items are noted in HPI.     Objective      /74 (BP Location: Left arm)   Wt 60.3 kg (133 lb)   LMP 2023   BMI 26.86 kg/m²   FHT: 148 BPM   Uterine Size: 23 cm and size equals dates

## 2024-01-04 ENCOUNTER — HOSPITAL ENCOUNTER (OUTPATIENT)
Facility: HOSPITAL | Age: 19
Discharge: HOME | End: 2024-01-04
Attending: OBSTETRICS & GYNECOLOGY | Admitting: OBSTETRICS & GYNECOLOGY
Payer: COMMERCIAL

## 2024-01-04 VITALS
WEIGHT: 133 LBS | SYSTOLIC BLOOD PRESSURE: 127 MMHG | OXYGEN SATURATION: 98 % | HEART RATE: 120 BPM | DIASTOLIC BLOOD PRESSURE: 66 MMHG | BODY MASS INDEX: 26.81 KG/M2 | TEMPERATURE: 98.5 F | RESPIRATION RATE: 16 BRPM | HEIGHT: 59 IN

## 2024-01-04 PROBLEM — N89.8 VAGINAL DISCHARGE DURING PREGNANCY IN SECOND TRIMESTER: Status: ACTIVE | Noted: 2024-01-04

## 2024-01-04 PROBLEM — Z3A.23 23 WEEKS GESTATION OF PREGNANCY: Status: ACTIVE | Noted: 2023-12-06

## 2024-01-04 PROBLEM — O26.892 VAGINAL DISCHARGE DURING PREGNANCY IN SECOND TRIMESTER: Status: ACTIVE | Noted: 2024-01-04

## 2024-01-04 PROCEDURE — 99214 OFFICE O/P EST MOD 30 MIN: CPT

## 2024-01-04 PROCEDURE — NC001 PR NO CHARGE: Performed by: OBSTETRICS & GYNECOLOGY

## 2024-01-04 NOTE — PROGRESS NOTES
L&D Triage Note - OB/GYN  Sharonda Kaplan 18 y.o. female MRN: [id number]  Unit/Bed#:  TRIAGE  Encounter: [id number]    Patient is seen by Dr. Romero    ASSESSMENT  Sharonda Kaplan is a 18 y.o.  at 23w4d presents to labor and delivery triage for loss of fluid. Premature rupture of membranes work up is negative.    PLAN  #1. Rule out premature rupture of membranes:   Speculum : No pooling, copious gray white vag discharge , no bleeding, no lesions, visually closed cervix   Nitrazine-negative  Ferning-negative  ZAHRA : Q1 3.15cm, Q2 5.14 cm, Q3 8.77cm Q4 10.57cm  SVE : closed/thick/high   FHT: appropriate for gestational age  TOCO: Iritability    Dr. Pal performed speculum exam, SVE  and ultrasound  Discharge instructions  Patient instructed to call if experiencing worsening contractions, vaginal bleeding, loss of fluid or decreased fetal movement.  Will follow up with OBGYN on 24.    D/w Dr. Romero  ______________    SUBJECTIVE    MONO: Estimated Date of Delivery: 24    HPI:  18 y.o.  23w4d presents with complaint of loss of vaginal fluid. Patient states that approximately 2 hours ago around 2pm this afternoon patient began leaking vaginal fluid. She states that it has been constant since then and is clear in consistency. Patient states that she looses more fluid when she stands up or moves around. Patient cannot quantify approximately how much fluid she has lost over the 2 hours.  She denies fluid loss in the past. Patient denies any abdominal pain or back pain. Patient denies any discomfort. She is feeling baby move as normal and is not having any contractions.      Patient denies any complications during this pregnancy.  Although patient does admit to a herpes infection approximately 1 year ago which she took Valtrex for.  Patient denies any lesions at this time.  Patient denies any other history of STDs.    Contractions: denies  Leakage of fluid: present  Vaginal  "Bleeding: none  Fetal movement: present    Her obstetrical history is insignificant    ROS:  Constitutional: Negative patient denies any fevers, chills  Respiratory: Negative patient denies any shortness of breath or wheezing  Cardiovascular: Negative patient denies any chest pain or palpitations  Gastrointestinal: Negative patient denies any abdominal pain, nausea, vomiting, diarrhea    OBJECTIVE:    Vitals:  /66   Pulse (!) 120   Temp 98.5 °F (36.9 °C) (Oral)   Resp 16   Ht 4' 11\" (1.499 m)   Wt 60.3 kg (133 lb)   LMP 07/04/2023   SpO2 98%   BMI 26.86 kg/m²   Body mass index is 26.86 kg/m².    Physical Exam  Vitals reviewed.   Constitutional:       Appearance: Normal appearance.   HENT:      Head: Normocephalic and atraumatic.   Eyes:      Conjunctiva/sclera: Conjunctivae normal.      Pupils: Pupils are equal, round, and reactive to light.   Cardiovascular:      Rate and Rhythm: Regular rhythm. Tachycardia present.      Pulses: Normal pulses.      Heart sounds: Normal heart sounds. No murmur heard.  Pulmonary:      Effort: Pulmonary effort is normal. No respiratory distress.      Breath sounds: Normal breath sounds. No wheezing or rales.   Abdominal:      General: Bowel sounds are normal. There is no distension.      Tenderness: There is no abdominal tenderness. There is no guarding.   Genitourinary:     General: Normal vulva.   Musculoskeletal:         General: No swelling. Normal range of motion.      Right lower leg: No edema.      Left lower leg: No edema.   Neurological:      General: No focal deficit present.      Mental Status: She is alert and oriented to person, place, and time.   Psychiatric:         Mood and Affect: Mood normal.         Behavior: Behavior normal.         Thought Content: Thought content normal.         Speculum exam:  Normal external female genitalia  Cervix fully visualized and not visibly dilated  Physiologic discharge present  No bleeding, pooling, abnormal discharge, " or lesions noted      Microscopy:     Infection:   - No clue cells    - No hyphae   - No trichomonads present    Membrane status   - No ferning   - No nitrazene   - No pooling     SVE:  Closed/Thick/High       FHT: baseline 150, moderate variability, accelerations  decelerations.        TOCO: no contractions noted        IMAGING:          TAUS   ZAHRA      - Q1 3.15 cm     - Q2 1.99 cm     - Q3 3.63 cm     - Q4 1.80 cm     - Total: 10.57 cm   Placenta: Anterior   Presentation: Breech    Labs: No results found for this or any previous visit (from the past 24 hour(s)).        Torey Lima MD  PGY1 Family Medicine    1/4/2024  7:00 PM

## 2024-01-08 ENCOUNTER — TELEPHONE (OUTPATIENT)
Dept: INTERNAL MEDICINE CLINIC | Facility: CLINIC | Age: 19
End: 2024-01-08

## 2024-01-08 NOTE — TELEPHONE ENCOUNTER
[facility] called to let us know they integrated with patients psychiatric plan for care. No further follow up needed, was just an informational call.

## 2024-01-24 ENCOUNTER — ROUTINE PRENATAL (OUTPATIENT)
Dept: OBGYN CLINIC | Facility: CLINIC | Age: 19
End: 2024-01-24
Payer: COMMERCIAL

## 2024-01-24 VITALS
BODY MASS INDEX: 27.62 KG/M2 | DIASTOLIC BLOOD PRESSURE: 66 MMHG | SYSTOLIC BLOOD PRESSURE: 114 MMHG | HEIGHT: 59 IN | WEIGHT: 137 LBS

## 2024-01-24 DIAGNOSIS — Z34.02 ENCOUNTER FOR SUPERVISION OF NORMAL FIRST PREGNANCY IN SECOND TRIMESTER: Primary | ICD-10-CM

## 2024-01-24 DIAGNOSIS — Z3A.26 26 WEEKS GESTATION OF PREGNANCY: ICD-10-CM

## 2024-01-24 DIAGNOSIS — Z36.9 ANTENATAL SCREENING ENCOUNTER: ICD-10-CM

## 2024-01-24 PROCEDURE — 81002 URINALYSIS NONAUTO W/O SCOPE: CPT | Performed by: PHYSICIAN ASSISTANT

## 2024-01-24 PROCEDURE — 99213 OFFICE O/P EST LOW 20 MIN: CPT | Performed by: PHYSICIAN ASSISTANT

## 2024-01-24 PROCEDURE — 87086 URINE CULTURE/COLONY COUNT: CPT | Performed by: PHYSICIAN ASSISTANT

## 2024-01-24 NOTE — PROGRESS NOTES
Assessment     Pregnancy 26 and 3/7 weeks     Plan     Routine OB visit doing well overall.  G1, P0  Noting some sinus congestion and ringing in ears.  Discussed medications safe in pregnancy, can use Sudafed since she is out of first trimester.  Can use nasal saline spray, warm steam humidifier.  Can follow-up with PCP if further treatment is needed.  Reflux stable with Tums as needed.    Blood pressure today 114/66, good fetal movement appreciated,   Urine dip with leuks - will send for culture r/o asymptomatic UTI  O+ blood type    Continue ASA, PNV daily  DC ASA at 36 weeks, start Valtrex suppressive therapy at 36 weeks for history of genital herpes.  Stressed importance of staying well-hydrated, well-balanced diet, exercise as tolerated.  28-week lab order provided today to be completed before next visit.  MFM follow-up growth scheduled 3/6, patient reminded.    Follow up in 2-3 weeks.      Chief Complaint   Patient presents with    Routine Prenatal Visit     Denies VB,LOF, headaches, vision changes, edema, cramping or contractions + fetal movement  , c/o sinus congestion and ringing in ears        Subjective     Sharonda Kaplan is a 18 y.o. female being seen today for her obstetrical visit. She is at 26w3d gestation.   She is doing well overall and has no concerns or complaints today aside from some ear ringing and nasal congestion. She is not taking anything for sxs. No other URI sxs.   Heartburn stable managed with TUMs prn.     Patient reports no bleeding, no contractions, no cramping, no leaking, and denies HA, dizziness, blurry vision, abd pain, N/V or swelling. Denies urinary or bowel problems . Fetal movement: normal.    She is taking PNV, ASA.      Menstrual History:  OB History          1    Para   0    Term   0       0    AB   0    Living   0         SAB   0    IAB   0    Ectopic   0    Multiple   0    Live Births   0                Menarche age: N/A  Patient's last menstrual  "period was 07/04/2023.       The following portions of the patient's history were reviewed and updated as appropriate: allergies, current medications, past family history, past medical history, past social history, past surgical history, and problem list.    Review of Systems  Pertinent items are noted in HPI.     Objective      /66 (BP Location: Left arm)   Ht 4' 11\" (1.499 m)   Wt 62.1 kg (137 lb)   LMP 07/04/2023   BMI 27.67 kg/m²   FHT: 149 BPM   Uterine Size: 26 cm and size equals dates            "

## 2024-01-27 LAB — BACTERIA UR CULT: NORMAL

## 2024-02-05 ENCOUNTER — APPOINTMENT (OUTPATIENT)
Dept: LAB | Facility: HOSPITAL | Age: 19
End: 2024-02-05
Payer: COMMERCIAL

## 2024-02-05 DIAGNOSIS — Z36.9 ANTENATAL SCREENING ENCOUNTER: ICD-10-CM

## 2024-02-05 LAB
BASOPHILS # BLD AUTO: 0.01 THOUSANDS/ÂΜL (ref 0–0.1)
BASOPHILS NFR BLD AUTO: 0 % (ref 0–1)
EOSINOPHIL # BLD AUTO: 0.09 THOUSAND/ÂΜL (ref 0–0.61)
EOSINOPHIL NFR BLD AUTO: 1 % (ref 0–6)
ERYTHROCYTE [DISTWIDTH] IN BLOOD BY AUTOMATED COUNT: 11.6 % (ref 11.6–15.1)
GLUCOSE 1H P 50 G GLC PO SERPL-MCNC: 157 MG/DL (ref 40–134)
HCT VFR BLD AUTO: 26.9 % (ref 34.8–46.1)
HGB BLD-MCNC: 9 G/DL (ref 11.5–15.4)
IMM GRANULOCYTES # BLD AUTO: 0.03 THOUSAND/UL (ref 0–0.2)
IMM GRANULOCYTES NFR BLD AUTO: 0 % (ref 0–2)
LYMPHOCYTES # BLD AUTO: 1.09 THOUSANDS/ÂΜL (ref 0.6–4.47)
LYMPHOCYTES NFR BLD AUTO: 15 % (ref 14–44)
MCH RBC QN AUTO: 30.1 PG (ref 26.8–34.3)
MCHC RBC AUTO-ENTMCNC: 33.5 G/DL (ref 31.4–37.4)
MCV RBC AUTO: 90 FL (ref 82–98)
MONOCYTES # BLD AUTO: 0.35 THOUSAND/ÂΜL (ref 0.17–1.22)
MONOCYTES NFR BLD AUTO: 5 % (ref 4–12)
NEUTROPHILS # BLD AUTO: 5.51 THOUSANDS/ÂΜL (ref 1.85–7.62)
NEUTS SEG NFR BLD AUTO: 79 % (ref 43–75)
NRBC BLD AUTO-RTO: 0 /100 WBCS
PLATELET # BLD AUTO: 391 THOUSANDS/UL (ref 149–390)
PMV BLD AUTO: 9.3 FL (ref 8.9–12.7)
RBC # BLD AUTO: 2.99 MILLION/UL (ref 3.81–5.12)
TREPONEMA PALLIDUM IGG+IGM AB [PRESENCE] IN SERUM OR PLASMA BY IMMUNOASSAY: NORMAL
WBC # BLD AUTO: 7.08 THOUSAND/UL (ref 4.31–10.16)

## 2024-02-05 PROCEDURE — 86780 TREPONEMA PALLIDUM: CPT

## 2024-02-05 PROCEDURE — 36415 COLL VENOUS BLD VENIPUNCTURE: CPT

## 2024-02-05 PROCEDURE — 82950 GLUCOSE TEST: CPT

## 2024-02-05 PROCEDURE — 85025 COMPLETE CBC W/AUTO DIFF WBC: CPT

## 2024-02-06 ENCOUNTER — TELEPHONE (OUTPATIENT)
Dept: OBGYN CLINIC | Facility: CLINIC | Age: 19
End: 2024-02-06

## 2024-02-06 DIAGNOSIS — O99.013 ANEMIA DURING PREGNANCY IN THIRD TRIMESTER: Primary | ICD-10-CM

## 2024-02-06 DIAGNOSIS — R73.09 ABNORMAL GTT (GLUCOSE TOLERANCE TEST): ICD-10-CM

## 2024-02-06 NOTE — TELEPHONE ENCOUNTER
I reviewed test results.  Her syphilis screen was nonreactive which is good.  Her gtt was elevated.  Recommend 3-hour glucose tolerance test (fasting) to further evaluate for gestational diabetes.  Order placed in chart  to complete ASAP.   Her CBC was abnormal with low hemoglobin suggesting anemia.  Anemia is common in pregnancy, but hemoglobin dropped from 11.2 down to 9.  I would recommend she start once daily iron from over-the-counter 325 mg/day.  Take 6-8 hrs apart from PNV, take with orange juice to help with absorption.  Due to the significance and the drop I would recommend evaluation with hematology to see if any other intervention for the anemia is needed.  I placed referral to hematology to schedule consultation.

## 2024-02-07 ENCOUNTER — TELEPHONE (OUTPATIENT)
Dept: HEMATOLOGY ONCOLOGY | Facility: CLINIC | Age: 19
End: 2024-02-07

## 2024-02-07 NOTE — TELEPHONE ENCOUNTER
I called Sharonda in response to a referral that was received for patient to establish care with Hematology.     Outreach was made to schedule a consultation.    I left a voicemail explaining the reason for my call and advised patient to call [facility] at [phone].  Another attempt will be made to contact patient.

## 2024-02-08 ENCOUNTER — APPOINTMENT (OUTPATIENT)
Dept: LAB | Facility: HOSPITAL | Age: 19
End: 2024-02-08
Payer: COMMERCIAL

## 2024-02-08 DIAGNOSIS — O24.419 GESTATIONAL DIABETES MELLITUS (GDM) IN THIRD TRIMESTER, GESTATIONAL DIABETES METHOD OF CONTROL UNSPECIFIED: Primary | ICD-10-CM

## 2024-02-08 DIAGNOSIS — R73.09 ABNORMAL GTT (GLUCOSE TOLERANCE TEST): ICD-10-CM

## 2024-02-08 DIAGNOSIS — R73.09 GLUCOSE TOLERANCE TEST ABNORMAL: ICD-10-CM

## 2024-02-08 DIAGNOSIS — Z3A.28 28 WEEKS GESTATION OF PREGNANCY: ICD-10-CM

## 2024-02-08 LAB
GLUCOSE 1H P 100 G GLC PO SERPL-MCNC: 177 MG/DL (ref 65–179)
GLUCOSE 2H P 100 G GLC PO SERPL-MCNC: 175 MG/DL (ref 65–154)
GLUCOSE 3H P 100 G GLC PO SERPL-MCNC: 146 MG/DL (ref 65–139)
GLUCOSE P FAST SERPL-MCNC: 74 MG/DL (ref 65–94)

## 2024-02-08 PROCEDURE — 36415 COLL VENOUS BLD VENIPUNCTURE: CPT

## 2024-02-08 PROCEDURE — 82951 GLUCOSE TOLERANCE TEST (GTT): CPT

## 2024-02-08 PROCEDURE — 82952 GTT-ADDED SAMPLES: CPT

## 2024-02-13 ENCOUNTER — TELEPHONE (OUTPATIENT)
Dept: HEMATOLOGY ONCOLOGY | Facility: CLINIC | Age: 19
End: 2024-02-13

## 2024-02-13 NOTE — TELEPHONE ENCOUNTER
I called Sharonda in response to a referral that was received for patient to establish care with Hematology.      Outreach was made to schedule a consultation.     I left a voicemail explaining the reason for my call and advised patient to call [facility] at [phone].  Another attempt will be made to contact patient.

## 2024-02-14 ENCOUNTER — TELEPHONE (OUTPATIENT)
Dept: HEMATOLOGY ONCOLOGY | Facility: CLINIC | Age: 19
End: 2024-02-14

## 2024-02-14 ENCOUNTER — ROUTINE PRENATAL (OUTPATIENT)
Dept: OBGYN CLINIC | Facility: CLINIC | Age: 19
End: 2024-02-14
Payer: COMMERCIAL

## 2024-02-14 VITALS
SYSTOLIC BLOOD PRESSURE: 100 MMHG | BODY MASS INDEX: 28.02 KG/M2 | HEIGHT: 59 IN | WEIGHT: 139 LBS | DIASTOLIC BLOOD PRESSURE: 60 MMHG

## 2024-02-14 DIAGNOSIS — Z3A.29 29 WEEKS GESTATION OF PREGNANCY: Primary | ICD-10-CM

## 2024-02-14 LAB
SL AMB  POCT GLUCOSE, UA: NEGATIVE
SL AMB LEUKOCYTE ESTERASE,UA: ABNORMAL
SL AMB POCT CLARITY,UA: CLEAR
SL AMB POCT COLOR,UA: YELLOW
SL AMB POCT NITRITE,UA: NEGATIVE
SL AMB POCT URINE PROTEIN: ABNORMAL

## 2024-02-14 PROCEDURE — 81002 URINALYSIS NONAUTO W/O SCOPE: CPT | Performed by: PHYSICIAN ASSISTANT

## 2024-02-14 PROCEDURE — 99213 OFFICE O/P EST LOW 20 MIN: CPT | Performed by: PHYSICIAN ASSISTANT

## 2024-02-14 NOTE — TELEPHONE ENCOUNTER
I called Sharonda in response to a referral that was received for patient to establish care with Hematology.     Outreach was made to schedule a consultation.    I left a voicemail explaining the reason for my call and advised patient to call [facility] at [phone].  This is the third attempt to schedule patient unsuccessfully. The referral has been closed, a [facility] message has been sent to patient (if applicable) and a letter has been sent to the address on file.

## 2024-02-14 NOTE — PROGRESS NOTES
Assessment     Pregnancy 29 and 3/7 weeks     Plan    Routine OB visit doing well overall.  She complains of daily heartburn and acid reflux taking omeprazole about 3 days a week, Tums on a daily basis.  Advised starting to take omeprazole 20 mg once a day and then Tums as needed for exacerbation.  Discussed with patient in great detail diet modification, avoiding eating late at night and avoiding laying down after eating.    Blood pressure today 100/60,  with fetal movement appreciated.  Urine dip 2+ leuks, trace protein.  Patient had 2+ leuks last visit and urine culture was sent out and was negative for asymptomatic UTI.  She is asymptomatic again today.  Blood type O+     28-week labs reviewed, abnormal:  RPR nonreactive  Patient had abnormal 3-hour GTT and has been referred to [facility] for GDM management.  She has appointment scheduled with them on 2/21.  Discussed with patient importance of minimizing sugar and carbohydrate intake, increasing nonstarchy vegetables, low sugar fruits and protein as well as increasing water intake and avoiding sugary drinks.  Patient also with anemia and has started oral iron from OTC.  I have referred her to hematology already as her hemoglobin dropped from 11.2 down to 9.0.  She has this appointment scheduled on 3/1.    [facility] follow-up for growth schedule 3/6.    Patient provided paperwork for authorization for delivery, birth certificate information, etc.  Patient requesting printed prescription for breast pump to provide to [employer] pump order.    Recommend starting kick counts  Tdap will be addressed and offered at next visit.      Follow up in 2 Weeks.      Chief Complaint   Patient presents with    Routine Prenatal Visit     No contractions, no cramping, no vaginal discharge ,no vaginal bleeding, no leaking of fluid + fetal movement        Subjective     Sharonda EDU Kaplan is a 18 y.o. female being seen today for her obstetrical visit. She is at 29w3d gestation. Patient  "reports no bleeding, no contractions, no cramping, no leaking, and Denies dizziness, blurry vision, abd pain, N/V. She did have HA yesterday that spontaneously resolved and has not reoccurred . Excessive heartburn and acid reflux, takes omperazole 20mg prn, tums daily.  Fetal movement: normal.    She is taking prenatal vitamin, ASA and oral iron daily.    Menstrual History:  OB History          1    Para   0    Term   0       0    AB   0    Living   0         SAB   0    IAB   0    Ectopic   0    Multiple   0    Live Births   0                Menarche age: N/A  Patient's last menstrual period was 2023.       The following portions of the patient's history were reviewed and updated as appropriate: allergies, current medications, past family history, past medical history, past social history, past surgical history, and problem list.    Review of Systems  Pertinent items are noted in HPI.     Objective     /60 (BP Location: Left arm, Patient Position: Sitting, Cuff Size: Standard)   Ht 4' 11\" (1.499 m)   Wt 63 kg (139 lb)   LMP 2023   BMI 28.07 kg/m²   FHT:  153 BPM   Uterine Size: 29 cm and size equals dates   Presentation: unsure              "

## 2024-02-21 ENCOUNTER — TELEMEDICINE (OUTPATIENT)
Dept: PERINATAL CARE | Facility: CLINIC | Age: 19
End: 2024-02-21
Payer: COMMERCIAL

## 2024-02-21 ENCOUNTER — DOCUMENTATION (OUTPATIENT)
Dept: PERINATAL CARE | Facility: CLINIC | Age: 19
End: 2024-02-21

## 2024-02-21 ENCOUNTER — PATIENT MESSAGE (OUTPATIENT)
Dept: PERINATAL CARE | Facility: CLINIC | Age: 19
End: 2024-02-21

## 2024-02-21 DIAGNOSIS — O24.410 DIET CONTROLLED GESTATIONAL DIABETES MELLITUS (GDM) IN THIRD TRIMESTER: Primary | ICD-10-CM

## 2024-02-21 DIAGNOSIS — Z3A.30 30 WEEKS GESTATION OF PREGNANCY: ICD-10-CM

## 2024-02-21 DIAGNOSIS — O24.410 DIET CONTROLLED GESTATIONAL DIABETES MELLITUS IN THIRD TRIMESTER: Primary | ICD-10-CM

## 2024-02-21 PROCEDURE — G0108 DIAB MANAGE TRN  PER INDIV: HCPCS | Performed by: DIETITIAN, REGISTERED

## 2024-02-21 RX ORDER — LANCETS 33 GAUGE
EACH MISCELLANEOUS
Qty: 100 EACH | Refills: 4 | Status: SHIPPED | OUTPATIENT
Start: 2024-02-21 | End: 2024-04-28

## 2024-02-21 RX ORDER — BLOOD SUGAR DIAGNOSTIC
STRIP MISCELLANEOUS
Qty: 100 STRIP | Refills: 4 | Status: SHIPPED | OUTPATIENT
Start: 2024-02-21 | End: 2024-04-28

## 2024-02-21 RX ORDER — BLOOD-GLUCOSE METER
EACH MISCELLANEOUS
Qty: 1 KIT | Refills: 0 | Status: SHIPPED | OUTPATIENT
Start: 2024-02-21 | End: 2024-04-28

## 2024-02-21 NOTE — PROGRESS NOTES
Demographics:  Language: English  Ethnicity: / / Maltese   Country of Origin: [place]  Highest grade completed: High School Diploma  Occupation: Unemployed    Personal & Family History:  Personal history of diabetes? no  Family members with diabetes: Grandfather and both paternal & maternal   How many total pregnancies have you had? 1  How many children do you have? 0  Are you having swelling or fluid retention? no  Have you been placed on bedrest? no    Nutrition & Physical Activity:  Do you exercise? yes  Type of Exercise: Walking  Frequency of Exercise: Other Sometimes  How many meals do you eat daily? 3  List times of meals: Breakfast: 8 AM-12 PM/ Lunch: 3 PM/ Dinner: 7-8 PM  How many snacks do you eat daily? Other sometimes  What type of diet are you following at home? Regular--trying to eat healthy  Do you have special Synagogue or ethnic dietary preferences? no  Do you have any food allergies or intolerances? no  Do you receive WIC or food stamps? no    Learning preferences:  How do you learn best? Video  How do you rate your health? Good  Who is your primary support person? Significant Other  How do you cope with stress? Crocheting  How do you feel about being pregnant with diabetes? Overwhelming

## 2024-02-21 NOTE — PROGRESS NOTES
Virtual Regular Visit    Verification of patient location: [place]  Patient is located at Home in the following state in which I hold an active license [place]      Assessment/Plan:    Problem List Items Addressed This Visit    None           Reason for visit is   Chief Complaint   Patient presents with    Virtual Regular Visit          Encounter provider Raiza Stephen    Provider located at [address] [id number] [phone]      Recent Visits  No visits were found meeting these conditions.  Showing recent visits within past 7 days and meeting all other requirements  Today's Visits  Date Type Provider Dept   24 Telemedicine Raiza Stephen [facility]   Showing today's visits and meeting all other requirements  Future Appointments  No visits were found meeting these conditions.  Showing future appointments within next 150 days and meeting all other requirements       The patient was identified by name and date of birth. Sharonda Kaplan was informed that this is a telemedicine visit and that the visit is being conducted through the Epic Embedded platform. She agrees to proceed..  My office door was closed. No one else was in the room.  She acknowledged consent and understanding of privacy and security of the video platform. The patient has agreed to participate and understands they can discontinue the visit at any time.    Patient is aware this is a billable service.     Subjective  Sharonda Kaplan is a 18 y.o. female pregnant patient .      HPI     Past Medical History:   Diagnosis Date    Abnormal body odor     Anemia     Constipation     Depression     Dysmenorrhea     Herpes     Hirsutism     Migraine     Ovarian cyst     Sleep apnea of         No past surgical history on file.    Current Outpatient Medications   Medication Sig Dispense Refill    aspirin 81 mg chewable tablet Chew 2 tablets (162 mg  "total) daily at bedtime 180 tablet 0    Iron, Ferrous Sulfate, 325 (65 Fe) MG TABS Take by mouth [place]      Prenatal MV-Min-Fe Fum-FA-DHA (PRENATAL 1 PO) Take by mouth      valACYclovir (VALTREX) 500 mg tablet Take 1 tablet (500 mg total) by mouth 2 (two) times a day for 5 days 10 tablet 3     No current facility-administered medications for this visit.        No Known Allergies    Review of Systems    Video Exam    There were no vitals filed for this visit.    Physical Exam --not performed    Visit Time  Total Visit Duration: 60 minutes        Thank you for referring your patient to [facility] Diabetes in Pregnancy Program.     Sharonda Kaplan is a  18 y.o. female who presents today for individual  Class 1.  Patient is at 30w3d gestation, Estimated Date of Delivery: 24.     Reviewed and updated the following from patients medical record: PMH, Problem List, Allergies, and Current Medications.    Visit Diagnosis:  Diet controlled GDM    Discussed with patient pathophysiology of GDM, untreated hyperglycemia in pregnancy and maternal fetal complications including fetal macrosomia,  hypoglycemia, polyhydramnios, increased incidence of  section,  labor, and in severe cases fetal demise and still birth . Discussed importance of blood glucose monitoring, nutrition, and medication if necessary in achieving BG goals.     Additional Pregnancy Complications:  None    Labs:    Lab Results   Component Value Date    LET2KIVT52TG 157 (H) 2024       Lab Results   Component Value Date    GLUF 74 2024    PAAMPPW2WI 177 2024    TOCURDP7RY 175 (H) 2024    TFXKLLK1XN 146 (H) 2024        No components found for: \"HGA1C\"    Medications:  No diabetes related medications    Anthropometrics:  Ht Readings from Last 3 Encounters:   24 4' 11\" (1.499 m)   24 4' 11\" (1.499 m)   24 4' 11\" (1.499 m)     Wt Readings from Last 3 Encounters: "   24 63 kg (139 lb)   24 62.1 kg (137 lb)   24 60.3 kg (133 lb)     Pre-gravid weight: 49.9 kg (110 lb)  Pre-gravid BMI: 22.21  Weight Change: 13.2 kg (29 lb)  Weight gain recommendations: BMI (18.5-24.9) 25-35 lbs  Comments: patient may gain 6 more [pounds for the reaminder of the pregnancy    Recent Ultra Sound Results:  Date: 24   Fetal Growth: Normal  ZAHRA: Normal  Next US date: 3/6/24    Blood Glucose Monitoring:   Glucose Meter: OneTouch Verio Flex  Instructed on testing blood sugars: 4 x per day (Fasting, 2 hour after start of each meal)    Gave instruction on site selection, skin preparation, loading strips and lancet device, meter activation, obtaining blood sample, test strip and lancet disposal and storage, and recording log book entries. Patient has good understanding of material covered and was able to test their own blood sugar in office today.     Instruction for reporting blood sugar results weekly via:  Phone: [phone]   OR  My Chart (Message with image attachment, or Glucose Flowsheet)    Goal Blood Sugar Ranges:   Fastin-90 mg/dL  1 hour after the start of each meal: 140 mg/dL or less  2 hours after start of each meal: 120 mg/dL or less    Meal Plan (daily calorie and protein needs):  Calories: 1800 calorie (CHO: 45-15-45-15-45-30) (PRO:2-1-3-1-3-2)    Type of Diet:Regular  Additional Nutrition Concerns: Enjoys rice & beans & juice. Advised patient to avoid juice now. .     Meal Plan Tips:  1. Patient was provided with a meal plan including 3 meals and 3 snacks.  2. Discussed appropriate amounts of CHO, PRO, and Fat at each meal and snack.   3. Reviewed CHO exchange list, and portion sizes for both CHO and PRO via food models  4. Instruction on how to read a food label  5. Provided suggested meal/snack options to increase nutrition and maintain consistent meal and snack intakes.  6. Instructed on how to keep a 3-day food diary to be brought to follow- up  "appointment.   7. Encouraged  patient to eat every 2.0-3.5 hours while awake  8. Encouraged patient to go no longer than 8-10 hours fasting overnight until first meal of the day.      Physical Activity:  Discussed benefits of physical activity to optimize blood glucose control, encouraged activity at patient is physically able. Always consult a physician prior to starting an exercise program. Recommend 20-30 minutes daily.    Patient Stated Goal: \"I will plan my meals and snacks every day\"    Diabetes Self Management Support Plan outside of ongoing care: Spouse/Family    Learner/s Present:Learners Present: Patient  and Significant Other  Barriers to Learning/Change: Cultural and Financial  Expected Compliance: good    Date to report blood sugars: Wednesday, 2/28/24  Class 2 (date): Thursday, 2/29/24    Begin Time: 10 AM  End Time: 11:05 AM    It was a pleasure working with them today. Please feel free to call with any questions or concerns.    Raiza Stephen, MS, RD, CDEC  Diabetes Educator  [facility]etal [facility]  Diabetes in Pregnancy Program  [address] [place] [address], [address]  [place] [address]         "

## 2024-02-22 ENCOUNTER — TELEPHONE (OUTPATIENT)
Dept: OBGYN CLINIC | Facility: CLINIC | Age: 19
End: 2024-02-22

## 2024-02-22 DIAGNOSIS — Z3A.30 30 WEEKS GESTATION OF PREGNANCY: Primary | ICD-10-CM

## 2024-02-22 NOTE — TELEPHONE ENCOUNTER
.Overall how are you feeling?  Doing Well     Compliant with routine OB appointments?  Yes    Have you completed your 3rd trimester lab work? Yes    Have you reviewed the contents of 3rd trimester folder from office? Yes     Have you decided on a pediatrician?  NO    If yes, who  Not yet    If no, what are you looking for and request sent for outreach.   Questions on paperwork to go back to office?  She will discuss at her next appointment    Questions on the baby birth certificate forms? NO    EPDS Scrore  Zero    Send link for the Hospital Readiness Video via "[employer]   .    Check out “Baby & Me Hospital Readiness Class” from [facility] on [employer].   The video is available for your viewing pleasure at [email or web address]

## 2024-02-22 NOTE — TELEPHONE ENCOUNTER
.Overall how are you doing?  Patient doing well     Compliant with routine OB care appointments? YES    Have you completed your 1st trimester labs? Yes    If you had NIPS with MFM, do you have a order for MSAFP? YES   Can be completed 15w-22w9d, ideally 16w-18w    Have you seen MFM and do you have your detailed US scheduled? YES    Pregnancy Education-have you had a chance to review the classes offered and registered? No yet     EPDS Score Zero

## 2024-02-28 ENCOUNTER — ROUTINE PRENATAL (OUTPATIENT)
Dept: OBGYN CLINIC | Facility: CLINIC | Age: 19
End: 2024-02-28
Payer: COMMERCIAL

## 2024-02-28 VITALS — SYSTOLIC BLOOD PRESSURE: 102 MMHG | DIASTOLIC BLOOD PRESSURE: 62 MMHG | WEIGHT: 138 LBS | BODY MASS INDEX: 27.87 KG/M2

## 2024-02-28 DIAGNOSIS — Z23 ENCOUNTER FOR IMMUNIZATION: ICD-10-CM

## 2024-02-28 DIAGNOSIS — O24.410 DIET CONTROLLED GESTATIONAL DIABETES MELLITUS (GDM) IN THIRD TRIMESTER: ICD-10-CM

## 2024-02-28 DIAGNOSIS — N89.8 VAGINAL DISCHARGE: ICD-10-CM

## 2024-02-28 DIAGNOSIS — N89.8 VAGINAL ITCHING: ICD-10-CM

## 2024-02-28 DIAGNOSIS — Z3A.31 31 WEEKS GESTATION OF PREGNANCY: Primary | ICD-10-CM

## 2024-02-28 LAB
SL AMB  POCT GLUCOSE, UA: NEGATIVE
SL AMB LEUKOCYTE ESTERASE,UA: NEGATIVE
SL AMB POCT NITRITE,UA: NEGATIVE
SL AMB POCT URINE PROTEIN: NEGATIVE

## 2024-02-28 PROCEDURE — 99213 OFFICE O/P EST LOW 20 MIN: CPT | Performed by: PHYSICIAN ASSISTANT

## 2024-02-28 PROCEDURE — 87510 GARDNER VAG DNA DIR PROBE: CPT | Performed by: PHYSICIAN ASSISTANT

## 2024-02-28 PROCEDURE — 90471 IMMUNIZATION ADMIN: CPT | Performed by: PHYSICIAN ASSISTANT

## 2024-02-28 PROCEDURE — 87660 TRICHOMONAS VAGIN DIR PROBE: CPT | Performed by: PHYSICIAN ASSISTANT

## 2024-02-28 PROCEDURE — 90715 TDAP VACCINE 7 YRS/> IM: CPT | Performed by: PHYSICIAN ASSISTANT

## 2024-02-28 PROCEDURE — 81002 URINALYSIS NONAUTO W/O SCOPE: CPT | Performed by: PHYSICIAN ASSISTANT

## 2024-02-28 PROCEDURE — 87480 CANDIDA DNA DIR PROBE: CPT | Performed by: PHYSICIAN ASSISTANT

## 2024-02-28 NOTE — PROGRESS NOTES
Assessment     Pregnancy 31 and 3/7 weeks     Plan     Routine OB visit doing well overall.  Acid reflux stable with omeprazole as prescribed last visit.  Otherwise patient doing well and has no new concerns or complaints today.    Last 2 visits patient noted to have 2+ leuks each time with last urine sample sent out for culture, mixed contaminants otherwise no UTI.  After further questioning patient does admit to some intermittent itching since earlier on in her pregnancy.  Vaginal exam revealing some slightly discolored light green/light yellow discharge of varying consistency with very mild generalized erythema but no strawberry cervix.  No vaginal bleeding, no fishy odor appreciated.  Vaginosis probe collected today and we will call with results and treat appropriately.     Blood pressure today 102/62,  with fetal movement appreciated.  Urine dip normal today.  Blood type O+     GDM, patient able to establish with MFM for management.  She will continue checking her sugars and follow-up with [facility] gestational diabetes team as appropriate.  Discussed with patient importance of minimizing sugar and carbohydrate intake, increasing nonstarchy vegetables, low sugar fruits and protein as well as increasing water intake and avoiding sugary drinks.    Patient also with anemia and has started oral iron from OTC.  I have referred her to hematology already as her hemoglobin dropped from 11.2 down to 9.0.  She has this appointment scheduled on 3/1.     MFM follow-up for growth schedule 3/6.     Patient provided paperwork for authorization for delivery, birth certificate information, etc.  Patient requesting printed prescription for breast pump to provide to [employer] pump order.     Stressed importance of staying well-hydrated drinking plenty of water, exercise as tolerated.  Patient to continue oral iron daily, as well as aspirin and PNV daily.  Stressed kick counts.  Patient counseled regarding Tdap vaccine in pregnancy  offered and accepted.  Vaccine administered by medical assistant today.    Follow up in 2 Weeks.      Chief Complaint   Patient presents with    Routine Prenatal Visit     TDAP offered and accepted. Vaccine given in right deltoid without difficulty, patient tolerated shot well.           Subjective     Sharonda Kaplan is a 18 y.o. female being seen today for her obstetrical visit. She is at 31w3d gestation. Patient reports no bleeding, no contractions, no cramping, no leaking, and denies HA, blurred vision, dizziness, N/V or abd pain  Denies swelling. Normal urination and BM's.  Fetal movement: normal.    She is concerned about 2+ leuks on urine dip at 2 previous OB visits.  Was normal today.  She denies abnormal discharge, but has had intermittent vaginal itching since early pregnancy.  No vaginal odor.  Urine culture was sent out from last urine sample at OB visit and showed mixed contaminants, no UTI.  She has no UTI symptoms.    Patient is established with [facility] for her gestational diabetes and started checking her blood sugars at home.  Fasting blood sugar average in the 80s and postprandial ranging from 90s to 110s.  She is doing well overall and watching her diet.     patient is taking prenatal vitamin, iron and aspirin daily.       Menstrual History:  OB History          1    Para   0    Term   0       0    AB   0    Living   0         SAB   0    IAB   0    Ectopic   0    Multiple   0    Live Births   0                Menarche age: N/A  Patient's last menstrual period was 2023.       The following portions of the patient's history were reviewed and updated as appropriate: allergies, current medications, past family history, past medical history, past social history, past surgical history, and problem list.    Review of Systems  Pertinent items are noted in HPI.     Objective     /62   Wt 62.6 kg (138 lb)   LMP 2023   BMI 27.87 kg/m²   FHT:  152 BPM   Uterine Size: 31 cm  and size equals dates   Presentation: unsure

## 2024-02-29 ENCOUNTER — CLINICAL SUPPORT (OUTPATIENT)
Dept: PERINATAL CARE | Facility: CLINIC | Age: 19
End: 2024-02-29
Payer: COMMERCIAL

## 2024-02-29 ENCOUNTER — OB ABSTRACT (OUTPATIENT)
Dept: OBGYN CLINIC | Facility: CLINIC | Age: 19
End: 2024-02-29

## 2024-02-29 DIAGNOSIS — Z3A.31 31 WEEKS GESTATION OF PREGNANCY: ICD-10-CM

## 2024-02-29 DIAGNOSIS — O24.410 DIET CONTROLLED GESTATIONAL DIABETES MELLITUS (GDM) IN THIRD TRIMESTER: Primary | ICD-10-CM

## 2024-02-29 PROBLEM — Z3A.26 26 WEEKS GESTATION OF PREGNANCY: Status: RESOLVED | Noted: 2023-12-06 | Resolved: 2024-02-29

## 2024-02-29 PROCEDURE — G0108 DIAB MANAGE TRN  PER INDIV: HCPCS

## 2024-02-29 RX ORDER — OMEPRAZOLE 20 MG/1
20 CAPSULE, DELAYED RELEASE ORAL DAILY
COMMUNITY

## 2024-02-29 NOTE — PATIENT INSTRUCTIONS
Test/Report Blood Sugars 4 Times Daily:   Before Breakfast (fasting; no food/only water 8-10hrs from bedtime snack)  After Breakfast (2hrs after the first bite of breakfast)  After Lunch (2hrs after the first bite of lunch)  After Dinner (2hrs after the first bite of dinner)

## 2024-02-29 NOTE — PROGRESS NOTES
"CLASS 2 - Individual  (virtual visit)    Thank you for referring your patient to [facility] Maternal Fetal Medicine Diabetes and Pregnancy Program.     Sharonda Kaplan is a  18 y.o. female who presents today unaccompanied for Virtual Regular Visit, Patient Education, and Gestational Diabetes.  Patient is at 31w4d gestation, Estimated Date of Delivery: 4/28/24.     Visit Diagnosis:  Encounter Diagnosis     ICD-10-CM    1. Diet controlled gestational diabetes mellitus (GDM) in third trimester  O24.410       2. 31 weeks gestation of pregnancy  Z3A.31          Reviewed and updated the following from patients medical record: PMH, Problem List, Allergies, and Current Medications.    Labs  GDM LABS: See Class 1 Note    A1C:  No results found for: \"HGBA1C\"     Labs Ordered This Visit: None    Current Medications:    Current Outpatient Medications:     aspirin 81 mg chewable tablet, Chew 2 tablets (162 mg total) daily at bedtime, Disp: 180 tablet, Rfl: 0    Blood Glucose Monitoring Suppl (OneTouch Verio Flex System) w/Device KIT, Test 4 times daily, Disp: 1 kit, Rfl: 0    Iron, Ferrous Sulfate, 325 (65 Fe) MG TABS, Take by mouth [place], Disp: , Rfl:     Lancets (OneTouch Delica Plus [id number]) MISC, Test 4 times daily, Disp: 100 each, Rfl: 4    omeprazole (PriLOSEC) 20 mg delayed release capsule, Take 20 mg by mouth daily, Disp: , Rfl:     OneTouch Verio test strip, Test 4 times daily, Disp: 100 strip, Rfl: 4    Prenatal MV-Min-Fe Fum-FA-DHA (PRENATAL 1 PO), Take by mouth, Disp: , Rfl:     valACYclovir (VALTREX) 500 mg tablet, Take 1 tablet (500 mg total) by mouth 2 (two) times a day for 5 days, Disp: 10 tablet, Rfl: 3     Anthropometrics:  Ht Readings from Last 1 Encounters:   02/14/24 4' 11\" (1.499 m)      Wt Readings from Last 3 Encounters:   02/28/24 62.6 kg (138 lb)   02/14/24 63 kg (139 lb)   01/24/24 62.1 kg (137 lb)        Pre-Gravid Wt Pre-Gravid BMI TWG   49.9 kg (110 lb) 22.21 12.7 kg (28 lb)     Total " Pregnancy Weight Gain Recommendations: BMI (18.5-24.9) 25-35 lbs  Current Wt Status Compared to Recommendations: Exceeding -- Recommended to maintain wt for remainder of pregnancy    Most Recent Ultrasound Results:  Findings: NML Growth/ZAHRA  Further Fetal Surveillance: None  Next US date: Scheduled Appropriately    BLOOD GLUCOSE MONITORING:   Glucometer: OneTouch Verio Flex     Reinforced at Today's Visit:   Timing/Frequency of SMB x per day (Fasting, 2 hour after start of each meal)  Goals: (Fasting) 60-95mg/dL // (2hr PP) <120mg/dL  Reporting Guidelines: Weekly via Phone: [phone] OR My Chart (Message with image attachment) OR Glucose Flowsheet  Method of Reporting: [employer] Glucose Flowsheet    BG LOG:           Review of Blood Glucose Log:   FBG well controlled. Testing after meals BG incorrectly. Testing every 2hrs and not 2hrs after meals.    MEAL PLAN (Patient was provided with a meal plan including 3 meals and 3 snacks at class 1)  *Calories: 1800 calorie (CHO: 45-15-45-15-45-30) (PRO:2-1-3-1-3-2)    Review of Patient's Current Diet: refer to class 1 note for additional details    Beverages:  Water, Milk, and sometimes a sip of juice  Dining Out Frequency: Bi-weekly    Meal Plan Recommendations Compliant? Comments:    Consistent CHO Intake No  - Inadequate CHO intake   3 Meals and 3 Snacks Yes     Protein w/ Every Meal and Snack No  - Inadequate PRO intake   Eating every 2-3.5hrs while awake  No     8-10hrs Fasting (from time of bedtime snack until first meal of the day) Yes          Reinforced Diet Instructions:  Individualized meal plan.   Importance of consistent carbohydrate intake via 3 meals and 3 snacks per day   Importance of protein as it relates to blood glucose control.   Encouraged  patient to eat every 2.0-3.5 hours while awake  Encouraged patient to go no longer than 8-10 hours fasting overnight until first meal of the day.  Provided suggested meal/snack options to increase nutrition  "and maintain consistent meal and snack intakes.    Physical Activity:  Currently physically active?  Works out 15min after breakfast for 30min using 3lb for strength training, squatting, and walking     Reviewed w/ Pt:   Benefits of physical activity to optimize blood glucose control, encouraged activity at patient is physically able.   Instructed pt to always consult a physician prior to starting an exercise program.   Recommend 20-30 minutes daily.    Additional Topics Reviewed:    Medications: (reviewed options available with pt)  Discussed if blood sugars are not within normal range with meal planning and exercise  Reviewed medication such as metformin and/or basal/bolus insulin may be needed for better glucose control  Maternal-Fetal Testing:   Ultrasounds: growth scans every 4 weeks.  NST: twice weekly starting at 32nd week GA  ZAHRA:  weekly starting at 32 weeks GA  Sick day Guidelines:   Advised that sickness will raise blood sugar   If blood sugar is > 160 mg/dL twice in one day call doctor  If on diabetes medications, continue as instructed   If unable to consume normal meal plan, instructed to remain well hydrated   Hypoglycemia & Treatment Guidelines:  Reviewed what hypoglycemia is, signs and symptoms, and how to treat via the 15:15 rule.  Post-Partum Guidelines:  Completion of 75 gm CHO 2 hr gtt at 6 weeks post-partum to check for Type 2 DM diagnosis  Breastfeeding Guidelines:  Continue GDM meal plan plus additional 350-500 calories daily  Examples of protein and carbohydrate snacks provided.  Stay hydrated by drinking 8-10 (8 oz.) fluids daily.  Dining Out & Travel Guidelines:  Patient advised to be prepared with extra diabetes supplies, medications, and snacks, as well as sticking to the same time schedule and portions eaten at home for meals and snacks.    Patient Stated Goal: \"I will plan my meals and snacks every day\"  Goal Assessment: On track    Diabetes Self Management Support Plan outside of " ongoing care: Spouse/Family    Barriers to Learning/Change: No Barriers  Expected Compliance: good    Date to report blood sugars: Weekly   Follow up:  Return per weekly review of blood sugar log.     Begin Time: 11:00am  End Time: 12:00pm     It was a pleasure working with them today. Please feel free to call [phone]) with any questions or concerns.    Homa Gonzales RD   Diabetes Educator  [facility] Maternal Fetal Medicine  Diabetes and Pregnancy Program  [address] [place] [address], [address]  [place] [address]    Virtual Regular Visit    Verification of patient location:    Patient is located at Home in the following state in which I hold an active license [place]      Assessment/Plan:    Problem List Items Addressed This Visit       31 weeks gestation of pregnancy     Other Visit Diagnoses       Diet controlled gestational diabetes mellitus (GDM) in third trimester    -  Primary                 Reason for visit is   Chief Complaint   Patient presents with    Virtual Regular Visit    Patient Education    Gestational Diabetes          Encounter provider Homa Gonzales RD    Provider located at [address] [id number] [phone]      Recent Visits  No visits were found meeting these conditions.  Showing recent visits within past 7 days and meeting all other requirements  Future Appointments  No visits were found meeting these conditions.  Showing future appointments within next 150 days and meeting all other requirements       The patient was identified by name and date of birth. Karrijose SORENSEN Rosaura was informed that this is a telemedicine visit and that the visit is being conducted through the Epic Embedded platform. She agrees to proceed..  My office door was closed. No one else was in the room.  She acknowledged consent and understanding of privacy and security of the video platform. The patient has agreed to participate and understands  they can discontinue the visit at any time.    Patient is aware this is a billable service.     Subjective  Sharonda Kaplan is a 18 y.o. female pregnant.      HPI     Past Medical History:   Diagnosis Date    Abnormal body odor     Anemia     Constipation     Depression     Dysmenorrhea     Herpes     Hirsutism     Migraine     Ovarian cyst     Sleep apnea of         No past surgical history on file.    Current Outpatient Medications   Medication Sig Dispense Refill    aspirin 81 mg chewable tablet Chew 2 tablets (162 mg total) daily at bedtime 180 tablet 0    Blood Glucose Monitoring Suppl (OneTouch Verio Flex System) w/Device KIT Test 4 times daily 1 kit 0    Iron, Ferrous Sulfate, 325 (65 Fe) MG TABS Take by mouth [place]      Lancets (OneTouch Delica Plus [id number]) MISC Test 4 times daily 100 each 4    omeprazole (PriLOSEC) 20 mg delayed release capsule Take 20 mg by mouth daily      OneTouch Verio test strip Test 4 times daily 100 strip 4    Prenatal MV-Min-Fe Fum-FA-DHA (PRENATAL 1 PO) Take by mouth      valACYclovir (VALTREX) 500 mg tablet Take 1 tablet (500 mg total) by mouth 2 (two) times a day for 5 days 10 tablet 3     No current facility-administered medications for this visit.        No Known Allergies    Review of Systems    Video Exam    There were no vitals filed for this visit.    Physical Exam     Visit Time  Total Visit Duration: 60min

## 2024-03-01 ENCOUNTER — OFFICE VISIT (OUTPATIENT)
Age: 19
End: 2024-03-01
Payer: COMMERCIAL

## 2024-03-01 ENCOUNTER — APPOINTMENT (OUTPATIENT)
Dept: LAB | Facility: HOSPITAL | Age: 19
End: 2024-03-01
Payer: COMMERCIAL

## 2024-03-01 ENCOUNTER — TELEPHONE (OUTPATIENT)
Age: 19
End: 2024-03-01

## 2024-03-01 VITALS
SYSTOLIC BLOOD PRESSURE: 109 MMHG | TEMPERATURE: 98.3 F | BODY MASS INDEX: 28.35 KG/M2 | OXYGEN SATURATION: 98 % | HEART RATE: 110 BPM | WEIGHT: 140.6 LBS | DIASTOLIC BLOOD PRESSURE: 70 MMHG | HEIGHT: 59 IN

## 2024-03-01 DIAGNOSIS — N76.0 BV (BACTERIAL VAGINOSIS): ICD-10-CM

## 2024-03-01 DIAGNOSIS — B37.31 VAGINAL YEAST INFECTION: Primary | ICD-10-CM

## 2024-03-01 DIAGNOSIS — O99.013 ANEMIA DURING PREGNANCY IN THIRD TRIMESTER: ICD-10-CM

## 2024-03-01 DIAGNOSIS — B96.89 BV (BACTERIAL VAGINOSIS): ICD-10-CM

## 2024-03-01 LAB
ANISOCYTOSIS BLD QL SMEAR: PRESENT
BASOPHILS # BLD MANUAL: 0 THOUSAND/UL (ref 0–0.1)
BASOPHILS NFR MAR MANUAL: 0 % (ref 0–1)
CANDIDA RRNA VAG QL PROBE: DETECTED
EOSINOPHIL # BLD MANUAL: 0 THOUSAND/UL (ref 0–0.4)
EOSINOPHIL NFR BLD MANUAL: 0 % (ref 0–6)
ERYTHROCYTE [DISTWIDTH] IN BLOOD BY AUTOMATED COUNT: 12.4 % (ref 11.6–15.1)
FERRITIN SERPL-MCNC: 15 NG/ML (ref 11–307)
FOLATE SERPL-MCNC: >22.3 NG/ML
G VAGINALIS RRNA GENITAL QL PROBE: DETECTED
HCT VFR BLD AUTO: 28.8 % (ref 34.8–46.1)
HGB BLD-MCNC: 9.6 G/DL (ref 11.5–15.4)
IRON SATN MFR SERPL: 15 % (ref 15–50)
IRON SERPL-MCNC: 71 UG/DL (ref 50–212)
LYMPHOCYTES # BLD AUTO: 1.33 THOUSAND/UL (ref 0.6–4.47)
LYMPHOCYTES # BLD AUTO: 21 % (ref 14–44)
MCH RBC QN AUTO: 30.3 PG (ref 26.8–34.3)
MCHC RBC AUTO-ENTMCNC: 33.3 G/DL (ref 31.4–37.4)
MCV RBC AUTO: 91 FL (ref 82–98)
MONOCYTES # BLD AUTO: 0.44 THOUSAND/UL (ref 0–1.22)
MONOCYTES NFR BLD: 7 % (ref 4–12)
NEUTROPHILS # BLD MANUAL: 4.55 THOUSAND/UL (ref 1.85–7.62)
NEUTS BAND NFR BLD MANUAL: 2 % (ref 0–8)
NEUTS SEG NFR BLD AUTO: 70 % (ref 43–75)
PLATELET # BLD AUTO: 369 THOUSANDS/UL (ref 149–390)
PLATELET BLD QL SMEAR: ADEQUATE
PMV BLD AUTO: 9.6 FL (ref 8.9–12.7)
RBC # BLD AUTO: 3.17 MILLION/UL (ref 3.81–5.12)
RBC MORPH BLD: PRESENT
RETICS # AUTO: ABNORMAL 10*3/UL (ref 14097–95744)
RETICS # CALC: 4.19 % (ref 0.37–1.87)
T VAGINALIS RRNA GENITAL QL PROBE: NOT DETECTED
TIBC SERPL-MCNC: 468 UG/DL (ref 250–450)
UIBC SERPL-MCNC: 397 UG/DL (ref 155–355)
VIT B12 SERPL-MCNC: 372 PG/ML (ref 180–914)
WBC # BLD AUTO: 6.32 THOUSAND/UL (ref 4.31–10.16)

## 2024-03-01 PROCEDURE — 83540 ASSAY OF IRON: CPT

## 2024-03-01 PROCEDURE — 83550 IRON BINDING TEST: CPT

## 2024-03-01 PROCEDURE — 82728 ASSAY OF FERRITIN: CPT

## 2024-03-01 PROCEDURE — 82746 ASSAY OF FOLIC ACID SERUM: CPT

## 2024-03-01 PROCEDURE — 85007 BL SMEAR W/DIFF WBC COUNT: CPT

## 2024-03-01 PROCEDURE — 36415 COLL VENOUS BLD VENIPUNCTURE: CPT

## 2024-03-01 PROCEDURE — 85027 COMPLETE CBC AUTOMATED: CPT

## 2024-03-01 PROCEDURE — 82607 VITAMIN B-12: CPT

## 2024-03-01 PROCEDURE — 99244 OFF/OP CNSLTJ NEW/EST MOD 40: CPT | Performed by: INTERNAL MEDICINE

## 2024-03-01 PROCEDURE — 85045 AUTOMATED RETICULOCYTE COUNT: CPT

## 2024-03-01 RX ORDER — METRONIDAZOLE 500 MG/1
500 TABLET ORAL EVERY 12 HOURS SCHEDULED
Qty: 14 TABLET | Refills: 0 | Status: SHIPPED | OUTPATIENT
Start: 2024-03-01 | End: 2024-03-08

## 2024-03-01 NOTE — TELEPHONE ENCOUNTER
----- Message from Cecile Quintana PA-C sent at 3/1/2024  1:30 PM EST -----  Staff, please call patient I received her vaginal culture results.  She is a pregnant patient with us.  Her vaginal culture was positive for bacterial vaginosis as well as vaginal yeast infection.  I have already sent in both medications she will need that are safe and can be used in pregnancy.  She will take metronidazole pills which is an antibiotic for the BV, 1 pill twice a day for 7 days, no alcohol with use.  For yeast infection Monistat 7 vaginal suppository cream is safe she will insert 1 applicatorful into the vagina before bed every night for a full 7 days.  Thank you

## 2024-03-02 NOTE — PROGRESS NOTES
HEMATOLOGY / ONCOLOGY CLINIC FOLLOW UP NOTE    Patient Sharonda Kaplan  MRN: [id number]  : 2005  Date of Encounter 3/1/2024      Referring Provider: Cecile Quintana    Reason for Encounter: Initial visit probable iron deficiency 32 weeks pregnant        Oncology History    No history exists.           Assessment / Plan:    18 year old first time pregnancy found to have Hgb 9.0 2024; MCV 90 here for anemia, possible iron deficiency    Anemia    Patient did not have iron studies at the time of the office visit; however were drawn afterward with total iron 71 ferrint 15 % sat 15 and TIBC 468 with HGB 9.6 MCV 91 plts 369 WBC 6.3 retic 4%.      She is not iron deficient with a normocytic anemia probably due to volume distribution of pregnancy    She has been on supplemental oral iron for one month in addition to her prenatal vitamins and can continue on this    She is on ferrous sulfate daily and can increase this to bid through the remainder of her pregnancy as she is not nauseated or constipated from iron    She does not need IV Venofer at this time    She has no symptoms and appears to be doing well with this pregnancy    Follow Up    6 weeks          History: 3/1/2024      Ms Kaplan is a 18 year old female  32 week pregnant female accompanied by her boyfriend regarding a normocytic anemia    The patient had labs in Oct 2023 with a WBC 4.7 Hgb 11.2 MCV 85 and plts 342.  This was repeated by her Ob/Gyn 2024 with Hgb 9.0 MCV 90 plts 391 and WBC 7.1    She is completely asymptomatic and was started on iron as well as prenatals.  She did not have iron studies, B12 or folate done at the time.    She has no complaints today.  She denies any N/V, constipation, blood in stool/urine, weight loss, pain.  She states the pregnancy appears normal and she has no issues.          REVIEW OF SYSTEMS:  Please note that a 14-point review of systems was performed to include Constitutional, HEENT,  Respiratory, CVS, GI, , Musculoskeletal, Integumentary, Neurologic, Rheumatologic, Endocrinologic, Psychiatric, Lymphatic, and Hematologic/Oncologic systems were reviewed and are negative unless otherwise stated in HPI. Positive and negative findings pertinent to this evaluation are incorporated into the history of present illness.      ECOG PS: 0    PROBLEM LIST:  Patient Active Problem List   Diagnosis    Chronic constipation    Tension type headache    Tonsillolith    Current moderate episode of major depressive disorder without prior episode (HCC)    Anxiety    Suicidal behavior without attempted self-injury    Anxiety and depression    Primigravida in second trimester    Vaginal discharge during pregnancy in second trimester    Breast feeding status of mother    31 weeks gestation of pregnancy    Diet controlled gestational diabetes mellitus (GDM) in third trimester       Past Medical History:   has a past medical history of Abnormal body odor, Anemia, Constipation, Depression, Dysmenorrhea, Herpes, Hirsutism, Migraine, Ovarian cyst, and Sleep apnea of .    PAST SURGICAL HISTORY:   has no past surgical history on file.    CURRENT MEDICATIONS  Current Outpatient Medications   Medication Sig Dispense Refill    Iron, Ferrous Sulfate, 325 (65 Fe) MG TABS Take by mouth [place]      omeprazole (PriLOSEC) 20 mg delayed release capsule Take 20 mg by mouth daily      Prenatal MV-Min-Fe Fum-FA-DHA (PRENATAL 1 PO) Take by mouth      aspirin 81 mg chewable tablet Chew 2 tablets (162 mg total) daily at bedtime 180 tablet 0    Blood Glucose Monitoring Suppl (OneTouch Verio Flex System) w/Device KIT Test 4 times daily 1 kit 0    Lancets (OneTouch Delica Plus [id number]) MISC Test 4 times daily 100 each 4    metroNIDAZOLE (FLAGYL) 500 mg tablet Take 1 tablet (500 mg total) by mouth every 12 (twelve) hours for 7 days DO NOT DRINK ALCOHOL WITH MEDICATION 14 tablet 0    miconazole (MONISTAT-7) 2 % vaginal cream  "Insert 1 applicator into the vagina daily at bedtime for 7 days 45 g 0    OneTouch Verio test strip Test 4 times daily 100 strip 4    valACYclovir (VALTREX) 500 mg tablet Take 1 tablet (500 mg total) by mouth 2 (two) times a day for 5 days 10 tablet 3     No current facility-administered medications for this visit.     [unfilled]    SOCIAL HISTORY:   reports that she has never smoked. She has never used smokeless tobacco. She reports that she does not currently use alcohol. She reports that she does not currently use drugs after having used the following drugs: Marijuana.     FAMILY HISTORY:  family history includes Alcohol abuse in her father and mother; Diabetes in her maternal grandfather, maternal uncle, and paternal grandfather; Drug abuse in her father and mother; No Known Problems in her half-brother, half-brother, half-brother, half-sister, half-sister, maternal grandmother, and paternal grandmother; Schizoaffective Disorder  in her maternal uncle; Tourette syndrome in her half-sister.     ALLERGIES:  has No Known Allergies.      Physical Exam:  Vital Signs:   Visit Vitals  /70 (BP Location: Left arm, Patient Position: Sitting, Cuff Size: Standard)   Pulse (!) 110   Temp 98.3 °F (36.8 °C) (Temporal)   Ht 4' 11\" (1.499 m)   Wt 63.8 kg (140 lb 9.6 oz)   LMP 07/04/2023   SpO2 98%   BMI 28.40 kg/m²   OB Status Pregnant   Smoking Status Never   BSA 1.59 m²     Body mass index is 28.4 kg/m².  Body surface area is 1.59 meters squared.    GEN: Alert, awake oriented x3, in no acute distress  HEENT- No pallor, icterus, cyanosis, no oral mucosal lesions,   LAD - no palpable cervical, clavicle, axillary, inguinal LAD  Heart- normal S1 S2, regular rate and rhythm, No murmur, rubs.   Lungs- clear breathing sound bilateral.   Abdomen- soft, Non tender, bowel sounds present  Extremities- No cyanosis, clubbing, edema  Neuro- No focal neurological deficit    Labs:  Lab Results   Component Value Date    WBC 6.32 03/01/2024 "    HGB 9.6 (L) 03/01/2024    HCT 28.8 (L) 03/01/2024    MCV 91 03/01/2024     03/01/2024     Lab Results   Component Value Date     09/25/2015    SODIUM 137 09/09/2023    K 3.2 (L) 09/09/2023     09/09/2023    CO2 25 09/09/2023    ANIONGAP 9 09/25/2015    AGAP 7 09/09/2023    BUN 7 09/09/2023    CREATININE 0.53 (L) 09/09/2023    GLUC 84 09/09/2023    GLUF 74 02/08/2024    CALCIUM 9.2 09/09/2023    AST 19 09/09/2023    ALT 12 09/09/2023    ALKPHOS 62 09/09/2023    PROT 8.0 09/25/2015    TP 7.2 09/09/2023    BILITOT 0.44 09/25/2015    TBILI 0.45 09/09/2023    EGFR 138 09/09/2023        Latest Reference Range & Units 03/01/24 16:26   Iron 50 - 212 ug/dL 71   FERRITIN 11 - 307 ng/mL 15   Iron Saturation 15 - 50 % 15   TIBC 250 - 450 ug/dL 468 (H)   UIBC 155 - 355 ug/dL 397 (H)   FOLATE >5.9 ng/mL >22.3   Vitamin B-12 180 - 914 pg/mL 372   WBC 4.31 - 10.16 Thousand/uL 6.32   RBC 3.81 - 5.12 Million/uL 3.17 (L)   Hemoglobin 11.5 - 15.4 g/dL 9.6 (L)   Hematocrit 34.8 - 46.1 % 28.8 (L)   MCV 82 - 98 fL 91   MCH 26.8 - 34.3 pg 30.3   MCHC 31.4 - 37.4 g/dL 33.3   RDW 11.6 - 15.1 % 12.4   Platelet Count 149 - 390 Thousands/uL 369   MPV 8.9 - 12.7 fL 9.6   Platelet Estimate Adequate  Adequate   Segs Relative 43 - 75 % 70   Abs Neutrophils 1.85 - 7.62 Thousand/uL 4.55   Bands Relative 0 - 8 % 2   Lymphocytes % 14 - 44 % 21   Monocytes 4 - 12 % 7   Eosinophils 0 - 6 % 0   Basophils Relative 0 - 1 % 0   (H): Data is abnormally high  (L): Data is abnormally low    I spent 45 minutes on chart review, face to face counseling time, coordination of care and documentation.    Shelly Boyer MD PhD

## 2024-03-06 ENCOUNTER — ULTRASOUND (OUTPATIENT)
Facility: HOSPITAL | Age: 19
End: 2024-03-06
Payer: COMMERCIAL

## 2024-03-06 VITALS
WEIGHT: 141.98 LBS | DIASTOLIC BLOOD PRESSURE: 68 MMHG | BODY MASS INDEX: 28.62 KG/M2 | HEART RATE: 104 BPM | SYSTOLIC BLOOD PRESSURE: 114 MMHG | HEIGHT: 59 IN

## 2024-03-06 DIAGNOSIS — O24.410 DIET CONTROLLED GESTATIONAL DIABETES MELLITUS (GDM) IN THIRD TRIMESTER: Primary | ICD-10-CM

## 2024-03-06 DIAGNOSIS — Z3A.32 32 WEEKS GESTATION OF PREGNANCY: ICD-10-CM

## 2024-03-06 PROCEDURE — 76816 OB US FOLLOW-UP PER FETUS: CPT | Performed by: OBSTETRICS & GYNECOLOGY

## 2024-03-06 PROCEDURE — 99213 OFFICE O/P EST LOW 20 MIN: CPT | Performed by: OBSTETRICS & GYNECOLOGY

## 2024-03-07 ENCOUNTER — OFFICE VISIT (OUTPATIENT)
Dept: OBGYN CLINIC | Facility: CLINIC | Age: 19
End: 2024-03-07
Payer: COMMERCIAL

## 2024-03-07 VITALS
HEIGHT: 59 IN | HEART RATE: 114 BPM | SYSTOLIC BLOOD PRESSURE: 116 MMHG | WEIGHT: 142.6 LBS | BODY MASS INDEX: 28.75 KG/M2 | DIASTOLIC BLOOD PRESSURE: 82 MMHG | OXYGEN SATURATION: 100 %

## 2024-03-07 DIAGNOSIS — O99.013 ANEMIA AFFECTING PREGNANCY IN THIRD TRIMESTER: ICD-10-CM

## 2024-03-07 DIAGNOSIS — Z3A.32 32 WEEKS GESTATION OF PREGNANCY: ICD-10-CM

## 2024-03-07 DIAGNOSIS — O24.410 DIET CONTROLLED GESTATIONAL DIABETES MELLITUS (GDM) IN THIRD TRIMESTER: Primary | ICD-10-CM

## 2024-03-07 PROCEDURE — 99213 OFFICE O/P EST LOW 20 MIN: CPT | Performed by: OBSTETRICS & GYNECOLOGY

## 2024-03-07 NOTE — PROGRESS NOTES
"Assessment & Plan  18 y.o.  at 32w4d presenting for routine prenatal visit. She is a transfer of care from Dr. Neri office.      Problem List       Chronic constipation    Tension type headache    Tonsillolith    Current moderate episode of major depressive disorder without prior episode (HCC)    Anxiety    Suicidal behavior without attempted self-injury    Anxiety and depression    Primigravida in second trimester    Vaginal discharge during pregnancy in second trimester    Breast feeding status of mother    32 weeks gestation of pregnancy    Overview     Prenatal labs wnl  [X]MSAFP negative   [ ]Flu vaccine ,[X] TDAP vaccine   [X]Delivery consent  [ ]Contraception             Diet controlled gestational diabetes mellitus (GDM) in third trimester    Anemia affecting pregnancy in third trimester    Overview     3/1/24 Hgb 9.6 ->recheck in 4 weeks  Taking oral iron          ____________________________________________________________  Subjective  She is without complaint.   She denies contractions, loss of fluid, or vaginal bleeding.   She feels regular fetal movements.       Objective  /82 (BP Location: Right arm, Patient Position: Sitting, Cuff Size: Standard)   Pulse (!) 114   Ht 4' 11\" (1.499 m)   Wt 64.7 kg (142 lb 9.6 oz)   LMP 2023   SpO2 100%   BMI 28.80 kg/m²   FHR wnl    Physical Exam  Constitutional:       Appearance: She is well-developed.   Cardiovascular:      Rate and Rhythm: Normal rate and regular rhythm.      Heart sounds: Normal heart sounds. No murmur heard.     No friction rub. No gallop.   Pulmonary:      Effort: Pulmonary effort is normal. No respiratory distress.      Breath sounds: No wheezing.   Abdominal:      Palpations: Abdomen is soft.      Tenderness: There is no abdominal tenderness.   Musculoskeletal:         General: No tenderness.   Neurological:      Mental Status: She is alert and oriented to person, place, and time.   Vitals reviewed.      "     Patient's Active Problem List  Patient Active Problem List   Diagnosis    Chronic constipation    Tension type headache    Tonsillolith    Current moderate episode of major depressive disorder without prior episode (HCC)    Anxiety    Suicidal behavior without attempted self-injury    Anxiety and depression    Primigravida in second trimester    Vaginal discharge during pregnancy in second trimester    Breast feeding status of mother    32 weeks gestation of pregnancy    Diet controlled gestational diabetes mellitus (GDM) in third trimester    Anemia affecting pregnancy in third trimester           Radha Tate MD  3/7/2024  12:13 PM

## 2024-03-22 ENCOUNTER — ROUTINE PRENATAL (OUTPATIENT)
Dept: OBGYN CLINIC | Facility: CLINIC | Age: 19
End: 2024-03-22
Payer: COMMERCIAL

## 2024-03-22 VITALS
DIASTOLIC BLOOD PRESSURE: 74 MMHG | HEIGHT: 59 IN | WEIGHT: 145 LBS | SYSTOLIC BLOOD PRESSURE: 124 MMHG | BODY MASS INDEX: 29.23 KG/M2

## 2024-03-22 DIAGNOSIS — O99.013 ANEMIA AFFECTING PREGNANCY IN THIRD TRIMESTER: ICD-10-CM

## 2024-03-22 DIAGNOSIS — A60.00 RECURRENT GENITAL HERPES: ICD-10-CM

## 2024-03-22 DIAGNOSIS — Z3A.34 34 WEEKS GESTATION OF PREGNANCY: ICD-10-CM

## 2024-03-22 DIAGNOSIS — O24.410 DIET CONTROLLED GESTATIONAL DIABETES MELLITUS (GDM) IN THIRD TRIMESTER: Primary | ICD-10-CM

## 2024-03-22 DIAGNOSIS — O24.410 DIET CONTROLLED GESTATIONAL DIABETES MELLITUS IN THIRD TRIMESTER: ICD-10-CM

## 2024-03-22 PROCEDURE — 99213 OFFICE O/P EST LOW 20 MIN: CPT | Performed by: OBSTETRICS & GYNECOLOGY

## 2024-03-22 RX ORDER — VALACYCLOVIR HYDROCHLORIDE 500 MG/1
1000 TABLET, FILM COATED ORAL DAILY
Qty: 56 TABLET | Refills: 0 | Status: SHIPPED | OUTPATIENT
Start: 2024-03-22 | End: 2024-04-19

## 2024-03-22 RX ORDER — LANCETS 33 GAUGE
EACH MISCELLANEOUS
Qty: 100 EACH | Refills: 4 | Status: SHIPPED | OUTPATIENT
Start: 2024-03-22 | End: 2024-05-28

## 2024-03-22 RX ORDER — BLOOD SUGAR DIAGNOSTIC
STRIP MISCELLANEOUS
Qty: 100 STRIP | Refills: 4 | Status: SHIPPED | OUTPATIENT
Start: 2024-03-22 | End: 2024-05-28

## 2024-03-22 NOTE — PROGRESS NOTES
"Assessment & Plan  18 y.o.  at 34w5d presenting for routine prenatal visit.       Problem List       Chronic constipation    Tension type headache    Tonsillolith    Current moderate episode of major depressive disorder without prior episode (HCC)    Anxiety    Suicidal behavior without attempted self-injury    Anxiety and depression    Primigravida in second trimester    Vaginal discharge during pregnancy in second trimester    Breast feeding status of mother    34 weeks gestation of pregnancy    Overview     Prenatal labs wnl  [X]MSAFP negative   [ ]Flu vaccine ,[X] TDAP vaccine   [X]Delivery consent  [ ]Contraception  Valtrex ordered for suppression at 36 weeks  Would like to await spontaneous labor             Diet controlled gestational diabetes mellitus (GDM) in third trimester    Anemia affecting pregnancy in third trimester    Overview     3/1/24 Hgb 9.6 ->recheck in 4 weeks  Taking oral iron          Other Visit Diagnoses       Diet controlled gestational diabetes mellitus in third trimester        Recurrent genital herpes              ____________________________________________________________  Subjective  She is without complaint.   She denies contractions, loss of fluid, or vaginal bleeding.   She feels regular fetal movements.       Objective  /74 (BP Location: Right arm, Patient Position: Sitting, Cuff Size: Standard)   Ht 4' 11\" (1.499 m)   Wt 65.8 kg (145 lb)   LMP 2023   BMI 29.29 kg/m²   FHR: 130's via doppler    Physical Exam  Constitutional:       Appearance: She is well-developed.   Cardiovascular:      Rate and Rhythm: Normal rate and regular rhythm.      Heart sounds: Normal heart sounds. No murmur heard.     No friction rub. No gallop.   Pulmonary:      Effort: Pulmonary effort is normal. No respiratory distress.      Breath sounds: No wheezing.   Abdominal:      Palpations: Abdomen is soft.      Tenderness: There is no abdominal tenderness.   Musculoskeletal:         " General: No tenderness.   Neurological:      Mental Status: She is alert and oriented to person, place, and time.   Vitals reviewed.          Patient's Active Problem List  Patient Active Problem List   Diagnosis    Chronic constipation    Tension type headache    Tonsillolith    Current moderate episode of major depressive disorder without prior episode (HCC)    Anxiety    Suicidal behavior without attempted self-injury    Anxiety and depression    Primigravida in second trimester    Vaginal discharge during pregnancy in second trimester    Breast feeding status of mother    34 weeks gestation of pregnancy    Diet controlled gestational diabetes mellitus (GDM) in third trimester    Anemia affecting pregnancy in third trimester           Radha Tate MD  3/22/2024  3:35 PM

## 2024-04-03 PROBLEM — Z3A.36 36 WEEKS GESTATION OF PREGNANCY: Status: ACTIVE | Noted: 2024-02-14

## 2024-04-04 ENCOUNTER — APPOINTMENT (OUTPATIENT)
Dept: LAB | Facility: CLINIC | Age: 19
End: 2024-04-04
Payer: COMMERCIAL

## 2024-04-04 ENCOUNTER — ROUTINE PRENATAL (OUTPATIENT)
Dept: OBGYN CLINIC | Facility: CLINIC | Age: 19
End: 2024-04-04
Payer: COMMERCIAL

## 2024-04-04 ENCOUNTER — TELEPHONE (OUTPATIENT)
Dept: OBGYN CLINIC | Facility: CLINIC | Age: 19
End: 2024-04-04

## 2024-04-04 VITALS
DIASTOLIC BLOOD PRESSURE: 66 MMHG | WEIGHT: 145 LBS | BODY MASS INDEX: 29.23 KG/M2 | HEIGHT: 59 IN | SYSTOLIC BLOOD PRESSURE: 104 MMHG | HEART RATE: 93 BPM

## 2024-04-04 DIAGNOSIS — Z3A.36 36 WEEKS GESTATION OF PREGNANCY: ICD-10-CM

## 2024-04-04 DIAGNOSIS — O09.93 ENCOUNTER FOR SUPERVISION OF HIGH RISK PREGNANCY IN THIRD TRIMESTER, ANTEPARTUM: Primary | ICD-10-CM

## 2024-04-04 DIAGNOSIS — O99.013 ANEMIA AFFECTING PREGNANCY IN THIRD TRIMESTER: ICD-10-CM

## 2024-04-04 DIAGNOSIS — O09.93 ENCOUNTER FOR SUPERVISION OF HIGH RISK PREGNANCY IN THIRD TRIMESTER, ANTEPARTUM: ICD-10-CM

## 2024-04-04 DIAGNOSIS — O24.410 DIET CONTROLLED GESTATIONAL DIABETES MELLITUS (GDM) IN THIRD TRIMESTER: ICD-10-CM

## 2024-04-04 PROBLEM — Z34.02 PRIMIGRAVIDA IN SECOND TRIMESTER: Status: RESOLVED | Noted: 2023-12-06 | Resolved: 2024-04-04

## 2024-04-04 LAB
ERYTHROCYTE [DISTWIDTH] IN BLOOD BY AUTOMATED COUNT: 12.9 % (ref 11.6–15.1)
HCT VFR BLD AUTO: 30.4 % (ref 34.8–46.1)
HGB BLD-MCNC: 10.2 G/DL (ref 11.5–15.4)
MCH RBC QN AUTO: 30.9 PG (ref 26.8–34.3)
MCHC RBC AUTO-ENTMCNC: 33.6 G/DL (ref 31.4–37.4)
MCV RBC AUTO: 92 FL (ref 82–98)
PLATELET # BLD AUTO: 293 THOUSANDS/UL (ref 149–390)
PMV BLD AUTO: 10.4 FL (ref 8.9–12.7)
RBC # BLD AUTO: 3.3 MILLION/UL (ref 3.81–5.12)
WBC # BLD AUTO: 4.02 THOUSAND/UL (ref 4.31–10.16)

## 2024-04-04 PROCEDURE — 85027 COMPLETE CBC AUTOMATED: CPT

## 2024-04-04 PROCEDURE — 36415 COLL VENOUS BLD VENIPUNCTURE: CPT

## 2024-04-04 PROCEDURE — 87150 DNA/RNA AMPLIFIED PROBE: CPT | Performed by: OBSTETRICS & GYNECOLOGY

## 2024-04-04 PROCEDURE — 99213 OFFICE O/P EST LOW 20 MIN: CPT | Performed by: OBSTETRICS & GYNECOLOGY

## 2024-04-04 RX ORDER — OMEPRAZOLE 20 MG/1
20 CAPSULE, DELAYED RELEASE ORAL DAILY
Qty: 90 CAPSULE | Refills: 1 | Status: SHIPPED | OUTPATIENT
Start: 2024-04-04

## 2024-04-04 RX ORDER — OMEPRAZOLE 20 MG/1
20 CAPSULE, DELAYED RELEASE ORAL DAILY
Qty: 30 CAPSULE | Refills: 1 | Status: SHIPPED | OUTPATIENT
Start: 2024-04-04 | End: 2024-04-04

## 2024-04-04 NOTE — TELEPHONE ENCOUNTER
Overall how are you feeling? Patient states see is doing well.    Compliant with routine OB appointments? Yes, patient transferred to office.    Have you completed your 3rd trimester lab work? Yes    Have you reviewed the contents of 3rd trimester folder from office? Yes   Have you decided on a pediatrician? Yes    If yes, who-Patient is unsure at this time.  She does not have the information with her.    If no, what are you looking for and request sent for outreach.   Questions on paperwork to go back to office? No   Questions on the baby birth certificate forms? No    EPDS Score: 4      Sent link for the Hospital Readiness Video via [employer]

## 2024-04-04 NOTE — PROGRESS NOTES
"Assessment & Plan  18 y.o.  at 36w4d presenting for routine prenatal visit.     Problem List       Chronic constipation    Tension type headache    Tonsillolith    Current moderate episode of major depressive disorder without prior episode (HCC)    Anxiety    Suicidal behavior without attempted self-injury    Anxiety and depression    Vaginal discharge during pregnancy in second trimester    Breast feeding status of mother    36 weeks gestation of pregnancy    Overview     Prenatal labs wnl  [X]MSAFP negative   [X] TDAP vaccine   [X]Delivery consent  GBS collected  [ ]Contraception  US 3/6: EFW 47%, VTX, f/u repeat growth  Valtrex ordered for suppression at 36 weeks  Would like to await spontaneous labor             Diet controlled gestational diabetes mellitus (GDM) in third trimester    Anemia affecting pregnancy in third trimester    Overview     3/1/24 Hgb 9.6 ->recheck in 4 weeks  Taking oral iron          Other Visit Diagnoses       Encounter for supervision of high risk pregnancy in third trimester, antepartum    -  Primary          ____________________________________________________________  Subjective  She is without complaint.   She denies contractions, loss of fluid, or vaginal bleeding.   She feels regular fetal movements.     Objective  /66 (BP Location: Left arm, Cuff Size: Standard)   Pulse 93   Ht 4' 11\" (1.499 m)   Wt 65.8 kg (145 lb)   LMP 2023   BMI 29.29 kg/m²   FHR: 145 bpm    Physical Exam  Constitutional:       Appearance: Normal appearance.   Genitourinary:      Vulva normal.   HENT:      Head: Normocephalic and atraumatic.   Cardiovascular:      Rate and Rhythm: Normal rate.   Pulmonary:      Effort: Pulmonary effort is normal. No respiratory distress.   Abdominal:      Palpations: Abdomen is soft.      Tenderness: There is no abdominal tenderness.   Musculoskeletal:         General: Normal range of motion.   Neurological:      Mental Status: She is alert.   Skin:    "  General: Skin is warm and dry.          Patient's Active Problem List  Patient Active Problem List   Diagnosis    Chronic constipation    Tension type headache    Tonsillolith    Current moderate episode of major depressive disorder without prior episode (HCC)    Anxiety    Suicidal behavior without attempted self-injury    Anxiety and depression    Vaginal discharge during pregnancy in second trimester    Breast feeding status of mother    36 weeks gestation of pregnancy    Diet controlled gestational diabetes mellitus (GDM) in third trimester    Anemia affecting pregnancy in third trimester           Clementina Matthew MD  4/4/2024  11:34 AM

## 2024-04-06 LAB — GP B STREP DNA SPEC QL NAA+PROBE: POSITIVE

## 2024-04-09 ENCOUNTER — ULTRASOUND (OUTPATIENT)
Facility: HOSPITAL | Age: 19
End: 2024-04-09
Attending: OBSTETRICS & GYNECOLOGY
Payer: COMMERCIAL

## 2024-04-09 VITALS
HEART RATE: 99 BPM | DIASTOLIC BLOOD PRESSURE: 70 MMHG | WEIGHT: 143.6 LBS | HEIGHT: 59 IN | BODY MASS INDEX: 28.95 KG/M2 | SYSTOLIC BLOOD PRESSURE: 130 MMHG

## 2024-04-09 DIAGNOSIS — O24.410 DIET CONTROLLED GESTATIONAL DIABETES MELLITUS (GDM) IN THIRD TRIMESTER: Primary | ICD-10-CM

## 2024-04-09 DIAGNOSIS — Z3A.37 37 WEEKS GESTATION OF PREGNANCY: ICD-10-CM

## 2024-04-09 DIAGNOSIS — Z36.89 ENCOUNTER FOR ULTRASOUND TO ASSESS FETAL GROWTH: ICD-10-CM

## 2024-04-09 PROCEDURE — 99213 OFFICE O/P EST LOW 20 MIN: CPT | Performed by: OBSTETRICS & GYNECOLOGY

## 2024-04-09 PROCEDURE — 76816 OB US FOLLOW-UP PER FETUS: CPT | Performed by: OBSTETRICS & GYNECOLOGY

## 2024-04-09 NOTE — PROGRESS NOTES
"[facility]: Sharonda Kaplan was seen today at 37w2d for fetal growth assessment ultrasound.  See ultrasound report under \"OB Procedures\" tab.  Please don't hesitate to contact our office with any concerns or questions.  -Larissa Cisneros MD    "

## 2024-04-09 NOTE — LETTER
"2024     Radha Tate MD  [address] [id number]    Patient: Sharonda Kaplan   YOB: 2005   Date of Visit: 2024       Dear Dr. Tate:    Thank you for referring Sharonda Kaplan to me for evaluation. Below are my notes for this consultation.    If you have questions, please do not hesitate to call me. I look forward to following your patient along with you.         Sincerely,        Larissa Cisneros MD        CC: No Recipients    Larissa Cisneros MD  2024  2:04 PM  Sign when Signing Visit  [facility]: Sharonda Kaplan was seen today at 37w2d for fetal growth assessment ultrasound.  See ultrasound report under \"OB Procedures\" tab.  Please don't hesitate to contact our office with any concerns or questions.  -Larissa Cisneros MD    "

## 2024-04-11 ENCOUNTER — ROUTINE PRENATAL (OUTPATIENT)
Dept: OBGYN CLINIC | Facility: CLINIC | Age: 19
End: 2024-04-11
Payer: COMMERCIAL

## 2024-04-11 VITALS
HEART RATE: 94 BPM | WEIGHT: 147.6 LBS | HEIGHT: 59 IN | BODY MASS INDEX: 29.76 KG/M2 | DIASTOLIC BLOOD PRESSURE: 74 MMHG | SYSTOLIC BLOOD PRESSURE: 114 MMHG | OXYGEN SATURATION: 98 %

## 2024-04-11 DIAGNOSIS — O24.410 DIET CONTROLLED GESTATIONAL DIABETES MELLITUS (GDM) IN THIRD TRIMESTER: Primary | ICD-10-CM

## 2024-04-11 DIAGNOSIS — Z3A.37 37 WEEKS GESTATION OF PREGNANCY: ICD-10-CM

## 2024-04-11 DIAGNOSIS — O99.013 ANEMIA AFFECTING PREGNANCY IN THIRD TRIMESTER: ICD-10-CM

## 2024-04-11 PROCEDURE — 99213 OFFICE O/P EST LOW 20 MIN: CPT | Performed by: OBSTETRICS & GYNECOLOGY

## 2024-04-11 NOTE — PROGRESS NOTES
"Assessment & Plan  18 y.o.  at 37w4d presenting for routine prenatal visit.       Problem List       Chronic constipation    Tension type headache    Tonsillolith    Current moderate episode of major depressive disorder without prior episode (HCC)    Anxiety    Suicidal behavior without attempted self-injury    Anxiety and depression    Vaginal discharge during pregnancy in second trimester    Breast feeding status of mother    37 weeks gestation of pregnancy    Overview     Prenatal labs wnl  [X]MSAFP negative   [X] TDAP vaccine   [X]Delivery consent  GBS collected  [ ]Contraception  US 3/6: EFW 47%, VTX, f/u repeat growth  Valtrex ordered for suppression at 36 weeks  Would like to await spontaneous labor             Diet controlled gestational diabetes mellitus (GDM) in third trimester    Anemia affecting pregnancy in third trimester    Overview     3/1/24 Hgb 9.6 ->10.2  Taking oral iron          ____________________________________________________________  Subjective  She is without complaint.   She denies contractions, loss of fluid, or vaginal bleeding.   She feels regular fetal movements.       Objective  /74 (BP Location: Left arm, Patient Position: Sitting, Cuff Size: Standard)   Pulse 94   Ht 4' 11\" (1.499 m)   Wt 67 kg (147 lb 9.6 oz)   LMP 2023   SpO2 98%   BMI 29.81 kg/m²   FHR: 120's via doppler    Physical Exam  Constitutional:       Appearance: She is well-developed.   Cardiovascular:      Rate and Rhythm: Normal rate and regular rhythm.      Heart sounds: Normal heart sounds. No murmur heard.     No friction rub. No gallop.   Pulmonary:      Effort: Pulmonary effort is normal. No respiratory distress.      Breath sounds: No wheezing.   Abdominal:      Palpations: Abdomen is soft.      Tenderness: There is no abdominal tenderness.   Musculoskeletal:         General: No tenderness.   Neurological:      Mental Status: She is alert and oriented to person, place, and time. "   Vitals reviewed.          Patient's Active Problem List  Patient Active Problem List   Diagnosis    Chronic constipation    Tension type headache    Tonsillolith    Current moderate episode of major depressive disorder without prior episode (HCC)    Anxiety    Suicidal behavior without attempted self-injury    Anxiety and depression    Vaginal discharge during pregnancy in second trimester    Breast feeding status of mother    37 weeks gestation of pregnancy    Diet controlled gestational diabetes mellitus (GDM) in third trimester    Anemia affecting pregnancy in third trimester           Radha Tate MD  4/11/2024  11:14 AM

## 2024-04-12 PROBLEM — Z3A.38 38 WEEKS GESTATION OF PREGNANCY: Status: ACTIVE | Noted: 2024-02-14

## 2024-04-16 ENCOUNTER — ROUTINE PRENATAL (OUTPATIENT)
Dept: OBGYN CLINIC | Facility: CLINIC | Age: 19
End: 2024-04-16
Payer: COMMERCIAL

## 2024-04-16 VITALS
BODY MASS INDEX: 29.53 KG/M2 | DIASTOLIC BLOOD PRESSURE: 80 MMHG | HEIGHT: 59 IN | SYSTOLIC BLOOD PRESSURE: 124 MMHG | WEIGHT: 146.5 LBS

## 2024-04-16 DIAGNOSIS — O09.93 ENCOUNTER FOR SUPERVISION OF HIGH RISK PREGNANCY IN THIRD TRIMESTER, ANTEPARTUM: Primary | ICD-10-CM

## 2024-04-16 DIAGNOSIS — O99.013 ANEMIA AFFECTING PREGNANCY IN THIRD TRIMESTER: ICD-10-CM

## 2024-04-16 DIAGNOSIS — Z3A.38 38 WEEKS GESTATION OF PREGNANCY: ICD-10-CM

## 2024-04-16 DIAGNOSIS — O24.410 DIET CONTROLLED GESTATIONAL DIABETES MELLITUS (GDM) IN THIRD TRIMESTER: ICD-10-CM

## 2024-04-16 PROBLEM — N89.8 VAGINAL DISCHARGE DURING PREGNANCY IN SECOND TRIMESTER: Status: RESOLVED | Noted: 2024-01-04 | Resolved: 2024-04-16

## 2024-04-16 PROBLEM — O26.892 VAGINAL DISCHARGE DURING PREGNANCY IN SECOND TRIMESTER: Status: RESOLVED | Noted: 2024-01-04 | Resolved: 2024-04-16

## 2024-04-16 PROCEDURE — 99213 OFFICE O/P EST LOW 20 MIN: CPT | Performed by: OBSTETRICS & GYNECOLOGY

## 2024-04-16 NOTE — PROGRESS NOTES
"Assessment & Plan  18 y.o.  at 38w2d presenting for routine prenatal visit.     Problem List       Chronic constipation    Tension type headache    Tonsillolith    Current moderate episode of major depressive disorder without prior episode (HCC)    Anxiety    Suicidal behavior without attempted self-injury    Anxiety and depression    Breast feeding status of mother    38 weeks gestation of pregnancy    Overview     Prenatal labs wnl  [X]MSAFP negative   [X] TDAP vaccine   [X]Delivery consent  GBS positive, PCN during labor  [ ]Contraception  US : EFW 52%, VTX  Valtrex ordered for suppression at 36 weeks  Would like to await spontaneous labor, but IOL if not delivered by due date             Diet controlled gestational diabetes mellitus (GDM) in third trimester    Anemia affecting pregnancy in third trimester    Overview     3/1/24 Hgb 9.6 ->10.2  Taking oral iron          Other Visit Diagnoses       Encounter for supervision of high risk pregnancy in third trimester, antepartum    -  Primary          ____________________________________________________________  Subjective  She is without complaint.   She denies contractions, loss of fluid, or vaginal bleeding.   She feels regular fetal movements.     Objective  /80 (BP Location: Right arm, Patient Position: Sitting, Cuff Size: Standard)   Ht 4' 11\" (1.499 m)   Wt 66.5 kg (146 lb 8 oz)   LMP 2023   BMI 29.59 kg/m²   FHR: 150 bpm  SVE closed/thick/high    Physical Exam  Constitutional:       Appearance: Normal appearance.   Genitourinary:      Vulva normal.   HENT:      Head: Normocephalic and atraumatic.   Cardiovascular:      Rate and Rhythm: Normal rate.   Pulmonary:      Effort: Pulmonary effort is normal. No respiratory distress.   Abdominal:      Palpations: Abdomen is soft.      Tenderness: There is no abdominal tenderness.   Musculoskeletal:         General: Normal range of motion.   Neurological:      Mental Status: She is alert. "   Skin:     General: Skin is warm and dry.          Patient's Active Problem List  Patient Active Problem List   Diagnosis    Chronic constipation    Tension type headache    Tonsillolith    Current moderate episode of major depressive disorder without prior episode (HCC)    Anxiety    Suicidal behavior without attempted self-injury    Anxiety and depression    Breast feeding status of mother    38 weeks gestation of pregnancy    Diet controlled gestational diabetes mellitus (GDM) in third trimester    Anemia affecting pregnancy in third trimester           Clementina Matthew MD  4/16/2024  12:36 PM

## 2024-04-22 ENCOUNTER — HOSPITAL ENCOUNTER (OUTPATIENT)
Dept: LABOR AND DELIVERY | Facility: HOSPITAL | Age: 19
Discharge: HOME | DRG: 560 | End: 2024-04-22
Payer: COMMERCIAL

## 2024-04-22 ENCOUNTER — HOSPITAL ENCOUNTER (OUTPATIENT)
Facility: HOSPITAL | Age: 19
Discharge: HOME | DRG: 560 | End: 2024-04-22
Attending: OBSTETRICS & GYNECOLOGY | Admitting: OBSTETRICS & GYNECOLOGY
Payer: COMMERCIAL

## 2024-04-22 ENCOUNTER — HOSPITAL ENCOUNTER (INPATIENT)
Facility: HOSPITAL | Age: 19
LOS: 4 days | Discharge: HOME | DRG: 560 | End: 2024-04-26
Attending: OBSTETRICS & GYNECOLOGY | Admitting: OBSTETRICS & GYNECOLOGY
Payer: COMMERCIAL

## 2024-04-22 VITALS
HEIGHT: 59 IN | SYSTOLIC BLOOD PRESSURE: 129 MMHG | RESPIRATION RATE: 16 BRPM | BODY MASS INDEX: 29.53 KG/M2 | HEART RATE: 109 BPM | WEIGHT: 146.5 LBS | OXYGEN SATURATION: 97 % | DIASTOLIC BLOOD PRESSURE: 76 MMHG | TEMPERATURE: 98.4 F

## 2024-04-22 DIAGNOSIS — Z3A.38 38 WEEKS GESTATION OF PREGNANCY: ICD-10-CM

## 2024-04-22 DIAGNOSIS — Z30.011 ENCOUNTER FOR INITIAL PRESCRIPTION OF CONTRACEPTIVE PILLS: ICD-10-CM

## 2024-04-22 PROBLEM — O36.8190 DECREASED FETAL MOVEMENT AFFECTING MANAGEMENT OF MOTHER, ANTEPARTUM: Status: ACTIVE | Noted: 2024-04-22

## 2024-04-22 PROBLEM — Z3A.39 39 WEEKS GESTATION OF PREGNANCY: Status: ACTIVE | Noted: 2024-02-14

## 2024-04-22 LAB
ABO GROUP BLD: NORMAL
BASOPHILS # BLD AUTO: 0.01 THOUSANDS/ÂΜL (ref 0–0.1)
BASOPHILS NFR BLD AUTO: 0 % (ref 0–1)
BLD GP AB SCN SERPL QL: NEGATIVE
EOSINOPHIL # BLD AUTO: 0.04 THOUSAND/ÂΜL (ref 0–0.61)
EOSINOPHIL NFR BLD AUTO: 1 % (ref 0–6)
ERYTHROCYTE [DISTWIDTH] IN BLOOD BY AUTOMATED COUNT: 13.3 % (ref 11.6–15.1)
GLUCOSE SERPL-MCNC: 102 MG/DL (ref 65–140)
HCT VFR BLD AUTO: 28.3 % (ref 34.8–46.1)
HGB BLD-MCNC: 10 G/DL (ref 11.5–15.4)
HOLD SPECIMEN: YES
IMM GRANULOCYTES # BLD AUTO: 0 THOUSAND/UL (ref 0–0.2)
IMM GRANULOCYTES NFR BLD AUTO: 0 % (ref 0–2)
LYMPHOCYTES # BLD AUTO: 1.28 THOUSANDS/ÂΜL (ref 0.6–4.47)
LYMPHOCYTES NFR BLD AUTO: 33 % (ref 14–44)
MCH RBC QN AUTO: 31.3 PG (ref 26.8–34.3)
MCHC RBC AUTO-ENTMCNC: 35.3 G/DL (ref 31.4–37.4)
MCV RBC AUTO: 89 FL (ref 82–98)
MONOCYTES # BLD AUTO: 0.3 THOUSAND/ÂΜL (ref 0.17–1.22)
MONOCYTES NFR BLD AUTO: 8 % (ref 4–12)
NEUTROPHILS # BLD AUTO: 2.21 THOUSANDS/ÂΜL (ref 1.85–7.62)
NEUTS SEG NFR BLD AUTO: 58 % (ref 43–75)
NRBC BLD AUTO-RTO: 0 /100 WBCS
PLATELET # BLD AUTO: 262 THOUSANDS/UL (ref 149–390)
PMV BLD AUTO: 10.1 FL (ref 8.9–12.7)
RBC # BLD AUTO: 3.19 MILLION/UL (ref 3.81–5.12)
RH BLD: POSITIVE
SPECIMEN EXPIRATION DATE: NORMAL
WBC # BLD AUTO: 3.84 THOUSAND/UL (ref 4.31–10.16)

## 2024-04-22 PROCEDURE — 4A1HXCZ MONITORING OF PRODUCTS OF CONCEPTION, CARDIAC RATE, EXTERNAL APPROACH: ICD-10-PCS | Performed by: OBSTETRICS & GYNECOLOGY

## 2024-04-22 PROCEDURE — 3E0DXGC INTRODUCTION OF OTHER THERAPEUTIC SUBSTANCE INTO MOUTH AND PHARYNX, EXTERNAL APPROACH: ICD-10-PCS | Performed by: OBSTETRICS & GYNECOLOGY

## 2024-04-22 PROCEDURE — 99213 OFFICE O/P EST LOW 20 MIN: CPT

## 2024-04-22 PROCEDURE — 85025 COMPLETE CBC W/AUTO DIFF WBC: CPT

## 2024-04-22 PROCEDURE — 82948 REAGENT STRIP/BLOOD GLUCOSE: CPT

## 2024-04-22 PROCEDURE — 86780 TREPONEMA PALLIDUM: CPT

## 2024-04-22 PROCEDURE — NC001 PR NO CHARGE: Performed by: OBSTETRICS & GYNECOLOGY

## 2024-04-22 PROCEDURE — 86900 BLOOD TYPING SEROLOGIC ABO: CPT

## 2024-04-22 PROCEDURE — 86850 RBC ANTIBODY SCREEN: CPT

## 2024-04-22 PROCEDURE — 86920 COMPATIBILITY TEST SPIN: CPT

## 2024-04-22 PROCEDURE — 86901 BLOOD TYPING SEROLOGIC RH(D): CPT

## 2024-04-22 RX ORDER — BUPIVACAINE HYDROCHLORIDE 2.5 MG/ML
30 INJECTION, SOLUTION EPIDURAL; INFILTRATION; INTRACAUDAL ONCE AS NEEDED
Status: DISCONTINUED | OUTPATIENT
Start: 2024-04-22 | End: 2024-04-23

## 2024-04-22 RX ORDER — VALACYCLOVIR HYDROCHLORIDE 500 MG/1
1000 TABLET, FILM COATED ORAL DAILY
Status: DISCONTINUED | OUTPATIENT
Start: 2024-04-23 | End: 2024-04-23

## 2024-04-22 RX ORDER — ONDANSETRON 2 MG/ML
4 INJECTION INTRAMUSCULAR; INTRAVENOUS EVERY 6 HOURS PRN
Status: DISCONTINUED | OUTPATIENT
Start: 2024-04-22 | End: 2024-04-23

## 2024-04-22 RX ORDER — CALCIUM CARBONATE 500 MG/1
1000 TABLET, CHEWABLE ORAL 2 TIMES DAILY PRN
Status: DISCONTINUED | OUTPATIENT
Start: 2024-04-22 | End: 2024-04-23

## 2024-04-22 RX ORDER — ACETAMINOPHEN 325 MG/1
650 TABLET ORAL EVERY 6 HOURS PRN
Status: DISCONTINUED | OUTPATIENT
Start: 2024-04-22 | End: 2024-04-23

## 2024-04-22 RX ORDER — SODIUM CHLORIDE, SODIUM LACTATE, POTASSIUM CHLORIDE, CALCIUM CHLORIDE 600; 310; 30; 20 MG/100ML; MG/100ML; MG/100ML; MG/100ML
125 INJECTION, SOLUTION INTRAVENOUS CONTINUOUS
Status: DISCONTINUED | OUTPATIENT
Start: 2024-04-22 | End: 2024-04-23

## 2024-04-22 RX ADMIN — PENICILLIN G POTASSIUM 5 MILLION UNITS: 20000000 INJECTION, POWDER, FOR SOLUTION INTRAVENOUS at 22:26

## 2024-04-22 RX ADMIN — SODIUM CHLORIDE 125 ML/HR: 0.9 INJECTION, SOLUTION INTRAVENOUS at 22:26

## 2024-04-22 NOTE — PROGRESS NOTES
L&D Triage Note - OB/GYN  Sharonda Kaplan 18 y.o. female MRN: [id number]  Unit/Bed#: LD PACU-03 Encounter: [id number]        Patient is seen by [facility] (Dr. Wilson, Dr. Tate, Dr. Matthew)    ASSESSMENT/PLAN  Sharonda Kaplan is a 18 y.o.  at 39w1d complaining of decreased fetal movement and lower abdominal pressure.      1) decreased fetal movement  - ZAHRA shows 13, with biggest pocket greater than 6.   - fetal ultrasound: shows baby is head down with adequate movement.    2) induction of labor  -patient wanted to have induction at her OBGYN office  -called office and scheduled induction after 8 pm today.    SVE:1 cm dilated/ 70/ posterior soft/ -1       FHT:  Baseline Rate (FHR): 135 bpm  Variability: Moderate  Accelerations: 15 x 15 or greater  Decelerations: None  FHR Category: Category I    TOCO:   Contraction Frequency (minutes): Occasional  Contraction Duration (seconds): 140-160  Contraction Intensity: Mild      Discharge instructions  - Patient instructed to call if experiencing worsening contractions, vaginal bleeding, loss of fluid or decreased fetal movement.  - Will follow up with OBGYN tonight at [place] after 8 pm for induction.    D/w Dr. Wilson  ______________    SUBJECTIVE    MONO: Estimated Date of Delivery: 24    HPI:  18 y.o.  39w1d presents with complaint of decreased fetal movement and lower abdominal pressure. The patient denies vaginal bleeding, gush of vaginal fluid, or abdominal pain. Patient states she still feel the baby moving the same number of times as before just not as much movement with each time. PMH includes gestastional diabetes well controlled with diet, depression, anxiety, ptsd, borderline personality disorder, genital HSV, anemia, sleep apnea, and positive strep B. The patient denies fever, chills, chest pain, SOB, diarrhea, constipation, nausea, vomiting, dysuria, polyuria, hematuria, or any other complaint at this  "time.    Contractions: mild, occasional contractions  Leakage of fluid: none  Vaginal Bleeding: none  Fetal movement: present    Her obstetrical history is significant for genital HSV, strep B positive, and gestational diabetes that is well controlled with diet.    ROS:  Constitutional: Negative  Respiratory: Negative  Cardiovascular: Negative    Gastrointestinal: Negative    Physical Exam  GEN: Well appearing, no apparent distress   ABD: Gravid, soft, mild tenderness in RLQ and LLQ to palpation, negative guarding or rebound tenderness  SVE:  1 cm dilated/ 70/ posterior soft/ -1      OBJECTIVE:  /76 (BP Location: Right arm)   Pulse (!) 109   Temp 98.4 °F (36.9 °C) (Oral)   Resp 16   Ht 4' 11.02\" (1.499 m)   Wt 66.5 kg (146 lb 8 oz)   LMP 07/04/2023   SpO2 97%   BMI 29.57 kg/m²   Body mass index is 29.57 kg/m².  Labs: No results found for this or any previous visit (from the past 24 hour(s)).      Ambrosio Soto MD  OB/GYN PGY-1  4/22/2024  3:01 PM      Portions of the record may have been created with voice recognition software.  Occasional wrong word or \"sound a like\" substitutions may have occurred due to the inherent limitations of voice recognition software.  Read the chart carefully and recognize, using context, where substitutions have occurred   "

## 2024-04-23 ENCOUNTER — ANESTHESIA EVENT (INPATIENT)
Dept: ANESTHESIOLOGY | Facility: HOSPITAL | Age: 19
DRG: 560 | End: 2024-04-23
Payer: COMMERCIAL

## 2024-04-23 ENCOUNTER — ANESTHESIA (INPATIENT)
Dept: ANESTHESIOLOGY | Facility: HOSPITAL | Age: 19
DRG: 560 | End: 2024-04-23
Payer: COMMERCIAL

## 2024-04-23 LAB
BASE EXCESS BLDCOA CALC-SCNC: -8.4 MMOL/L (ref 3–11)
BASE EXCESS BLDCOV CALC-SCNC: -6.6 MMOL/L (ref 1–9)
GLUCOSE SERPL-MCNC: 62 MG/DL (ref 65–140)
GLUCOSE SERPL-MCNC: 68 MG/DL (ref 65–140)
GLUCOSE SERPL-MCNC: 68 MG/DL (ref 65–140)
GLUCOSE SERPL-MCNC: 69 MG/DL (ref 65–140)
GLUCOSE SERPL-MCNC: 70 MG/DL (ref 65–140)
GLUCOSE SERPL-MCNC: 71 MG/DL (ref 65–140)
GLUCOSE SERPL-MCNC: 72 MG/DL (ref 65–140)
GLUCOSE SERPL-MCNC: 74 MG/DL (ref 65–140)
GLUCOSE SERPL-MCNC: 75 MG/DL (ref 65–140)
GLUCOSE SERPL-MCNC: 98 MG/DL (ref 65–140)
HCO3 BLDCOA-SCNC: 19.5 MMOL/L (ref 17.3–27.3)
HCO3 BLDCOV-SCNC: 18.6 MMOL/L (ref 12.2–28.6)
O2 CT VFR BLDCOA CALC: 3.6 ML/DL
OXYHGB MFR BLDCOA: 18.8 %
OXYHGB MFR BLDCOV: 59 %
PCO2 BLDCOA: 49 MM[HG] (ref 30–60)
PCO2 BLDCOV: 36 MM HG (ref 27–43)
PH BLDCOA: 7.22 [PH] (ref 7.23–7.43)
PH BLDCOV: 7.33 [PH] (ref 7.19–7.49)
PO2 BLDCOA: 12.6 MM HG (ref 5–25)
PO2 BLDCOV: 23.7 MM HG (ref 15–45)
SAO2 % BLDCOV: 11.1 ML/DL
TREPONEMA PALLIDUM IGG+IGM AB [PRESENCE] IN SERUM OR PLASMA BY IMMUNOASSAY: NORMAL

## 2024-04-23 PROCEDURE — 85025 COMPLETE CBC W/AUTO DIFF WBC: CPT

## 2024-04-23 PROCEDURE — 3E033GC INTRODUCTION OF OTHER THERAPEUTIC SUBSTANCE INTO PERIPHERAL VEIN, PERCUTANEOUS APPROACH: ICD-10-PCS | Performed by: OBSTETRICS & GYNECOLOGY

## 2024-04-23 PROCEDURE — 82948 REAGENT STRIP/BLOOD GLUCOSE: CPT

## 2024-04-23 PROCEDURE — 0UQMXZZ REPAIR VULVA, EXTERNAL APPROACH: ICD-10-PCS | Performed by: OBSTETRICS & GYNECOLOGY

## 2024-04-23 PROCEDURE — 85384 FIBRINOGEN ACTIVITY: CPT

## 2024-04-23 PROCEDURE — 10907ZC DRAINAGE OF AMNIOTIC FLUID, THERAPEUTIC FROM PRODUCTS OF CONCEPTION, VIA NATURAL OR ARTIFICIAL OPENING: ICD-10-PCS | Performed by: OBSTETRICS & GYNECOLOGY

## 2024-04-23 PROCEDURE — 85730 THROMBOPLASTIN TIME PARTIAL: CPT

## 2024-04-23 PROCEDURE — 82805 BLOOD GASES W/O2 SATURATION: CPT

## 2024-04-23 PROCEDURE — 59409 OBSTETRICAL CARE: CPT | Performed by: OBSTETRICS & GYNECOLOGY

## 2024-04-23 PROCEDURE — 4A1HXCZ MONITORING OF PRODUCTS OF CONCEPTION, CARDIAC RATE, EXTERNAL APPROACH: ICD-10-PCS | Performed by: OBSTETRICS & GYNECOLOGY

## 2024-04-23 PROCEDURE — 0HQ9XZZ REPAIR PERINEUM SKIN, EXTERNAL APPROACH: ICD-10-PCS | Performed by: OBSTETRICS & GYNECOLOGY

## 2024-04-23 PROCEDURE — 3E033VJ INTRODUCTION OF OTHER HORMONE INTO PERIPHERAL VEIN, PERCUTANEOUS APPROACH: ICD-10-PCS | Performed by: OBSTETRICS & GYNECOLOGY

## 2024-04-23 PROCEDURE — 0T9B70Z DRAINAGE OF BLADDER WITH DRAINAGE DEVICE, VIA NATURAL OR ARTIFICIAL OPENING: ICD-10-PCS | Performed by: OBSTETRICS & GYNECOLOGY

## 2024-04-23 PROCEDURE — 0UC97ZZ EXTIRPATION OF MATTER FROM UTERUS, VIA NATURAL OR ARTIFICIAL OPENING: ICD-10-PCS | Performed by: OBSTETRICS & GYNECOLOGY

## 2024-04-23 PROCEDURE — 0UQGXZZ REPAIR VAGINA, EXTERNAL APPROACH: ICD-10-PCS | Performed by: OBSTETRICS & GYNECOLOGY

## 2024-04-23 PROCEDURE — 85610 PROTHROMBIN TIME: CPT

## 2024-04-23 RX ORDER — OXYTOCIN/RINGER'S LACTATE 30/500 ML
1-30 PLASTIC BAG, INJECTION (ML) INTRAVENOUS
Status: DISCONTINUED | OUTPATIENT
Start: 2024-04-23 | End: 2024-04-23

## 2024-04-23 RX ORDER — DOCUSATE SODIUM 100 MG/1
100 CAPSULE, LIQUID FILLED ORAL 2 TIMES DAILY
Status: DISCONTINUED | OUTPATIENT
Start: 2024-04-24 | End: 2024-04-26 | Stop reason: HOSPADM

## 2024-04-23 RX ORDER — BENZOCAINE/MENTHOL 6 MG-10 MG
1 LOZENGE MUCOUS MEMBRANE DAILY PRN
Status: DISCONTINUED | OUTPATIENT
Start: 2024-04-23 | End: 2024-04-26 | Stop reason: HOSPADM

## 2024-04-23 RX ORDER — ACETAMINOPHEN 325 MG/1
650 TABLET ORAL EVERY 6 HOURS PRN
Status: DISCONTINUED | OUTPATIENT
Start: 2024-04-23 | End: 2024-04-26 | Stop reason: HOSPADM

## 2024-04-23 RX ORDER — METHYLERGONOVINE MALEATE 0.2 MG/ML
0.2 INJECTION INTRAVENOUS ONCE
Status: COMPLETED | OUTPATIENT
Start: 2024-04-23 | End: 2024-04-23

## 2024-04-23 RX ORDER — TRANEXAMIC ACID 10 MG/ML
1000 INJECTION, SOLUTION INTRAVENOUS ONCE
Status: COMPLETED | OUTPATIENT
Start: 2024-04-23 | End: 2024-04-23

## 2024-04-23 RX ORDER — CALCIUM CARBONATE 500 MG/1
1000 TABLET, CHEWABLE ORAL DAILY PRN
Status: DISCONTINUED | OUTPATIENT
Start: 2024-04-23 | End: 2024-04-26 | Stop reason: HOSPADM

## 2024-04-23 RX ORDER — METHYLERGONOVINE MALEATE 0.2 MG/ML
INJECTION INTRAVENOUS
Status: COMPLETED
Start: 2024-04-23 | End: 2024-04-23

## 2024-04-23 RX ORDER — IBUPROFEN 600 MG/1
600 TABLET ORAL EVERY 6 HOURS
Status: DISCONTINUED | OUTPATIENT
Start: 2024-04-23 | End: 2024-04-26 | Stop reason: HOSPADM

## 2024-04-23 RX ORDER — ROPIVACAINE HYDROCHLORIDE 2 MG/ML
INJECTION, SOLUTION EPIDURAL; INFILTRATION; PERINEURAL AS NEEDED
Status: DISCONTINUED | OUTPATIENT
Start: 2024-04-23 | End: 2024-04-24 | Stop reason: HOSPADM

## 2024-04-23 RX ORDER — OXYTOCIN/RINGER'S LACTATE 30/500 ML
62.5 PLASTIC BAG, INJECTION (ML) INTRAVENOUS CONTINUOUS
Status: DISPENSED | OUTPATIENT
Start: 2024-04-23 | End: 2024-04-24

## 2024-04-23 RX ORDER — SODIUM CHLORIDE 9 MG/ML
125 INJECTION, SOLUTION INTRAVENOUS CONTINUOUS
Status: DISCONTINUED | OUTPATIENT
Start: 2024-04-23 | End: 2024-04-23

## 2024-04-23 RX ORDER — ONDANSETRON 2 MG/ML
4 INJECTION INTRAMUSCULAR; INTRAVENOUS EVERY 8 HOURS PRN
Status: DISCONTINUED | OUTPATIENT
Start: 2024-04-23 | End: 2024-04-26 | Stop reason: HOSPADM

## 2024-04-23 RX ORDER — MORPHINE SULFATE 4 MG/ML
4 INJECTION, SOLUTION INTRAMUSCULAR; INTRAVENOUS ONCE
Status: COMPLETED | OUTPATIENT
Start: 2024-04-23 | End: 2024-04-23

## 2024-04-23 RX ORDER — OXYTOCIN/RINGER'S LACTATE 30/500 ML
250 PLASTIC BAG, INJECTION (ML) INTRAVENOUS ONCE
Status: COMPLETED | OUTPATIENT
Start: 2024-04-23 | End: 2024-04-24

## 2024-04-23 RX ORDER — CARBOPROST TROMETHAMINE 250 UG/ML
INJECTION, SOLUTION INTRAMUSCULAR
Status: DISPENSED
Start: 2024-04-23 | End: 2024-04-24

## 2024-04-23 RX ORDER — DIPHENHYDRAMINE HYDROCHLORIDE 50 MG/ML
25 INJECTION INTRAMUSCULAR; INTRAVENOUS EVERY 6 HOURS PRN
Status: DISCONTINUED | OUTPATIENT
Start: 2024-04-23 | End: 2024-04-25

## 2024-04-23 RX ORDER — METOCLOPRAMIDE HYDROCHLORIDE 5 MG/ML
10 INJECTION INTRAMUSCULAR; INTRAVENOUS ONCE
Status: COMPLETED | OUTPATIENT
Start: 2024-04-23 | End: 2024-04-23

## 2024-04-23 RX ADMIN — VALACYCLOVIR HYDROCHLORIDE 1000 MG: 500 TABLET, FILM COATED ORAL at 09:05

## 2024-04-23 RX ADMIN — ROPIVACAINE HYDROCHLORIDE 5 ML: 2 INJECTION, SOLUTION EPIDURAL; INFILTRATION at 16:02

## 2024-04-23 RX ADMIN — Medication 250 MILLI-UNITS/MIN: at 23:02

## 2024-04-23 RX ADMIN — CALCIUM CARBONATE (ANTACID) CHEW TAB 500 MG 1000 MG: 500 CHEW TAB at 17:21

## 2024-04-23 RX ADMIN — METHYLERGONOVINE MALEATE 0.2 MG: 0.2 INJECTION INTRAVENOUS at 22:52

## 2024-04-23 RX ADMIN — ROPIVACAINE HYDROCHLORIDE: 2 INJECTION, SOLUTION EPIDURAL; INFILTRATION at 16:00

## 2024-04-23 RX ADMIN — PENICILLIN G POTASSIUM 2.5 MILLION UNITS: 20000000 INJECTION, POWDER, FOR SOLUTION INTRAVENOUS at 17:45

## 2024-04-23 RX ADMIN — ROPIVACAINE HYDROCHLORIDE 8 ML/HR: 2 INJECTION, SOLUTION EPIDURAL; INFILTRATION at 16:04

## 2024-04-23 RX ADMIN — Medication 50 MCG: at 00:49

## 2024-04-23 RX ADMIN — METHYLERGONOVINE MALEATE 0.2 MG: 0.2 INJECTION, SOLUTION INTRAMUSCULAR; INTRAVENOUS at 22:52

## 2024-04-23 RX ADMIN — ROPIVACAINE HYDROCHLORIDE 5 ML: 2 INJECTION, SOLUTION EPIDURAL; INFILTRATION at 15:59

## 2024-04-23 RX ADMIN — MORPHINE SULFATE 4 MG: 4 INJECTION INTRAVENOUS at 13:01

## 2024-04-23 RX ADMIN — PENICILLIN G POTASSIUM 2.5 MILLION UNITS: 20000000 INJECTION, POWDER, FOR SOLUTION INTRAVENOUS at 13:56

## 2024-04-23 RX ADMIN — PENICILLIN G POTASSIUM 2.5 MILLION UNITS: 20000000 INJECTION, POWDER, FOR SOLUTION INTRAVENOUS at 21:52

## 2024-04-23 RX ADMIN — SODIUM CHLORIDE 125 ML/HR: 0.9 INJECTION, SOLUTION INTRAVENOUS at 12:15

## 2024-04-23 RX ADMIN — PENICILLIN G POTASSIUM 2.5 MILLION UNITS: 20000000 INJECTION, POWDER, FOR SOLUTION INTRAVENOUS at 06:03

## 2024-04-23 RX ADMIN — METOCLOPRAMIDE 10 MG: 5 INJECTION, SOLUTION INTRAMUSCULAR; INTRAVENOUS at 17:46

## 2024-04-23 RX ADMIN — ONDANSETRON 4 MG: 2 INJECTION INTRAMUSCULAR; INTRAVENOUS at 21:50

## 2024-04-23 RX ADMIN — TRANEXAMIC ACID 1000 MG: 10 INJECTION, SOLUTION INTRAVENOUS at 23:02

## 2024-04-23 RX ADMIN — PENICILLIN G POTASSIUM 2.5 MILLION UNITS: 20000000 INJECTION, POWDER, FOR SOLUTION INTRAVENOUS at 02:38

## 2024-04-23 RX ADMIN — PENICILLIN G POTASSIUM 2.5 MILLION UNITS: 20000000 INJECTION, POWDER, FOR SOLUTION INTRAVENOUS at 09:56

## 2024-04-23 RX ADMIN — Medication 2 MILLI-UNITS/MIN: at 06:00

## 2024-04-23 RX ADMIN — ONDANSETRON 4 MG: 2 INJECTION INTRAMUSCULAR; INTRAVENOUS at 13:11

## 2024-04-23 NOTE — H&P
H & P- Obstetrics   Sharonda Kaplan 18 y.o. female MRN: [id number]  Unit/Bed#: -01 Encounter: [id number]    Assessment: 18 y.o.  at 39w1d admitted for IOL for A1GDM and DFM at term.  SVE: 1.5/70/-1  FHT: Cat 1  Clinical EFW: 52% at 37w ; Cephalic confirmed by TAUS  GBS status: Positive, plan for PCN in labor, no reported allergies   Postpartum contraception plan: POP     Plan:   Decreased fetal movement affecting management of mother, antepartum  Assessment & Plan  Improved from triage visit earlier today    Anemia affecting pregnancy in third trimester  Assessment & Plan   Hgb 10.2, on PO iron  Admission Hgb  10.0    Diet controlled gestational diabetes mellitus (GDM) in third trimester  Assessment & Plan      Recent Labs     24  2222   POCGLU 102     Diet controlled  POCT in labor  Discussed insulin gtt if persistently elevated  Last meal at 1830 hrs this evening  Diabetic clear liquid diet in labor    Current moderate episode of major depressive disorder without prior episode (HCC)  Assessment & Plan  No currently on medication  Early EPDS postpartum   Does have hx of prior suicide attempt, but no active or recent thoughts of SI or HI    * 39 weeks gestation of pregnancy  Assessment & Plan  Admit to OBGYN for IOL for A1GDM, DFM @ term  Clear liquid diet   F/u T&S, CBC, RPR   IVF LR 125cc/hr   Continuous fetal monitoring and tocometry   Analgesia at maternal request   Vertex by TAUS  Induction plan: bass, cytotec followed by pitocin         Discussed case and plan w/ Dr. Toussaint-Foster    Chief Complaint: here for induction    HPI: Sharonda Kaplan is a 18 y.o.  with an MONO of 2024, by Ultrasound at 39w1d who is being admitted for induction of labor. She reports very infrequent contractions, has no LOF, and reports no VB. She states she has felt good FM.    Patient Active Problem List   Diagnosis    Chronic constipation    Tension type headache    Tonsillolith    Current  moderate episode of major depressive disorder without prior episode (HCC)    Anxiety    Suicidal behavior without attempted self-injury    Anxiety and depression    Breast feeding status of mother    38 weeks gestation of pregnancy    Diet controlled gestational diabetes mellitus (GDM) in third trimester    Anemia affecting pregnancy in third trimester    Decreased fetal movement affecting management of mother, antepartum       Baby complications/comments: HOLLIE U/S 24 @ 37w2d\  Fetus # 1 of 1  Vertex presentation  Fetal growth appeared normal  Placenta Location = Anterior     MEASUREMENTS (* Included In Average GA)     AC             33.48 cm        37 weeks 3 days* (68%)  BPD             9.01 cm        36 weeks 4 days* (45%)  HC             33.39 cm        38 weeks 1 day * (46%)  Femur           7.06 cm        36 weeks 1 day * (23%)     HC/AC           1.00 [0.92 - 1.05]                 (43%)  FL/AC             21 [20 - 24]  FL/BPD            78 [71 - 87]  EFW [place] 4   3111 grams - 6 lbs 14 oz                 (52%)     THE AVERAGE GESTATIONAL AGE is 37 weeks 1 day +/- 21 days.     AMNIOTIC FLUID     Q1: 4.8      Q2: 4.8      Q3: 4.5      Q4: 6.0  ZAHRA Total = 20.1 cm  Amniotic Fluid: Normal    Review of Systems    OB Hx:  OB History    Para Term  AB Living   1 0 0 0 0 0   SAB IAB Ectopic Multiple Live Births   0 0 0 0 0      # Outcome Date GA Lbr Alec/2nd Weight Sex Delivery Anes PTL Lv   1 Current                Past Medical Hx:  Past Medical History:   Diagnosis Date    Abnormal body odor     Anemia     Constipation     Depression     Dysmenorrhea     Herpes     Hirsutism     Migraine     Ovarian cyst     Sleep apnea of         Past Surgical hx:  No past surgical history on file.    Social Hx:  Alcohol use: denies  Tobacco use: denies  Other substance use: hx MJ use prior to pregnanc, no use during pregnancy    No Known Allergies    Medications Prior to Admission   Medication    Blood  Glucose Monitoring Suppl (OneTouch Verio Flex System) w/Device KIT    Iron, Ferrous Sulfate, 325 (65 Fe) MG TABS    Lancets (OneTouch Delica Plus [id number]) MISC    omeprazole (PriLOSEC) 20 mg delayed release capsule    OneTouch Verio test strip    Prenatal MV-Min-Fe Fum-FA-DHA (PRENATAL 1 PO)    valACYclovir (VALTREX) 500 mg tablet       Objective:  Temp:  [98.4 °F (36.9 °C)] 98.4 °F (36.9 °C)  HR:  [109] 109  Resp:  [16] 16  BP: (129)/(76) 129/76  There is no height or weight on file to calculate BMI.     Physical Exam:  Physical Exam  Constitutional:       General: She is not in acute distress.     Appearance: Normal appearance. She is not ill-appearing or toxic-appearing.   Genitourinary:      Vulva normal.   Rectum:      Guaiac result negative.   HENT:      Head: Normocephalic and atraumatic.      Right Ear: External ear normal.      Left Ear: External ear normal.      Nose: Nose normal.      Mouth/Throat:      Mouth: Mucous membranes are moist.   Eyes:      General: Lids are normal.      Extraocular Movements: Extraocular movements intact.      Conjunctiva/sclera: Conjunctivae normal.   Cardiovascular:      Rate and Rhythm: Normal rate and regular rhythm.      Pulses: Normal pulses.      Heart sounds: Normal heart sounds. No murmur heard.  Pulmonary:      Effort: Pulmonary effort is normal. No respiratory distress.   Abdominal:      Palpations: Abdomen is soft.      Tenderness: There is no abdominal tenderness.      Comments: gravid   Musculoskeletal:      Cervical back: Full passive range of motion without pain and normal range of motion.      Right lower leg: No edema.      Left lower leg: No edema.      Right foot: Normal range of motion.      Left foot: Normal range of motion.   Neurological:      General: No focal deficit present.      Mental Status: She is alert.      Motor: No weakness.   Skin:     General: Skin is warm and dry.   Psychiatric:         Mood and Affect: Mood normal.         Judgment:  Judgment normal.   Vitals reviewed. Exam conducted with a chaperone present.          FHT:  Baseline 150 bpm, moderate variability, 15 x 15 accelerations present, no decelerations      TOCO:  Irregular    Lab Results   Component Value Date    WBC 4.02 (L) 04/04/2024    HGB 10.2 (L) 04/04/2024    HCT 30.4 (L) 04/04/2024     04/04/2024     Lab Results   Component Value Date     09/25/2015    K 3.2 (L) 09/09/2023     09/09/2023    CO2 25 09/09/2023    BUN 7 09/09/2023    CREATININE 0.53 (L) 09/09/2023    GLUCOSE 77 09/25/2015    AST 19 09/09/2023    ALT 12 09/09/2023     Prenatal Labs: Reviewed      Blood type: O positiv  Antibody: negative  GBS: positive  HIV: non-reactive  Rubella: immune  Syphilis IgM/IgG: non-reactive  HBsAg: non-reactive  HCAb: non-reactive  Chlamydia: negative  Gonorrhea: negative  Diabetes 1 hour screen: Abnormal  3 hr GTT: Elevated   Platelets: 293,000 as of 4/4/24  Hgb: 10.2 as of 4/4/24  >2 Midnights  INPATIENT     Signature/Title: Isaac Thomas DO  Date: 4/22/2024  Time: 9:52 PM

## 2024-04-23 NOTE — ANESTHESIA PREPROCEDURE EVALUATION
Procedure:  LABOR ANALGESIA    Relevant Problems   ANESTHESIA (within normal limits)      CARDIO (within normal limits)      ENDO (within normal limits)      GYN   (+) 39 weeks gestation of pregnancy      HEMATOLOGY   (+) Anemia affecting pregnancy in third trimester      NEURO/PSYCH   (+) Anxiety   (+) Anxiety and depression   (+) Current moderate episode of major depressive disorder without prior episode (HCC)   (+) Tension type headache      PULMONARY (within normal limits)      Other   (+) Tonsillolith        Physical Exam    Airway    Mallampati score: II  TM Distance: >3 FB  Neck ROM: full     Dental       Cardiovascular  Cardiovascular exam normal    Pulmonary  Pulmonary exam normal     Other Findings  post-pubertal.      Anesthesia Plan  ASA Score- 2     Anesthesia Type- epidural with ASA Monitors.         Additional Monitors:     Airway Plan:            Plan Factors-    Chart reviewed.   Existing labs reviewed. Patient summary reviewed.                  Induction-     Postoperative Plan-     Informed Consent- Anesthetic plan and risks discussed with patient.

## 2024-04-23 NOTE — ASSESSMENT & PLAN NOTE
- Pain well controlled with oral analgesics  - Voiding spontaneously  - Tolerating PO fluids and solids  - Ambulating without difficulty  - Encourage breastfeeding and familial bonding  - Contraception: POPs

## 2024-04-23 NOTE — ANESTHESIA PROCEDURE NOTES
Epidural Block    Patient location during procedure: OB  Start time: 4/23/2024 3:58 PM  Reason for block: at surgeon's request and primary anesthetic  Staffing  Performed by: Gregg Ibarra MD  Authorized by: Gregg Ibarra MD    Preanesthetic Checklist  Completed: patient identified, IV checked, site marked, risks and benefits discussed, surgical consent, monitors and equipment checked, pre-op evaluation and timeout performed  Epidural  Patient position: sitting  Prep: Betadine  Sedation Level: no sedation  Patient monitoring: frequent blood pressure checks  Approach: midline  Location: lumbar, L3-4  Injection technique: LINDSAY saline  Needle  Needle type: Tuohy   Needle gauge: 18 G  Catheter type: side hole  Catheter size: 20 G  Catheter securement method: clear occlusive dressing  Test dose: negative  Assessment  Sensory level: T10  Number of attempts: 1negative aspiration for CSF, negative aspiration for heme and no paresthesia on injection  patient tolerated the procedure well with no immediate complications

## 2024-04-23 NOTE — ASSESSMENT & PLAN NOTE
No currently on medication  Early EPDS postpartum with a score of 0  Does have hx of prior suicide attempt, but no active or recent thoughts of SI or HI

## 2024-04-24 LAB
APTT PPP: 30 SECONDS (ref 23–37)
BASOPHILS # BLD AUTO: 0.01 THOUSANDS/ÂΜL (ref 0–0.1)
BASOPHILS # BLD AUTO: 0.01 THOUSANDS/ÂΜL (ref 0–0.1)
BASOPHILS # BLD AUTO: 0.02 THOUSANDS/ÂΜL (ref 0–0.1)
BASOPHILS NFR BLD AUTO: 0 % (ref 0–1)
DME PARACHUTE DELIVERY DATE ACTUAL: NORMAL
DME PARACHUTE DELIVERY DATE REQUESTED: NORMAL
DME PARACHUTE ITEM DESCRIPTION: NORMAL
DME PARACHUTE ORDER STATUS: NORMAL
DME PARACHUTE SUPPLIER NAME: NORMAL
DME PARACHUTE SUPPLIER PHONE: NORMAL
EOSINOPHIL # BLD AUTO: 0 THOUSAND/ÂΜL (ref 0–0.61)
EOSINOPHIL # BLD AUTO: 0 THOUSAND/ÂΜL (ref 0–0.61)
EOSINOPHIL # BLD AUTO: 0.06 THOUSAND/ÂΜL (ref 0–0.61)
EOSINOPHIL NFR BLD AUTO: 0 % (ref 0–6)
EOSINOPHIL NFR BLD AUTO: 0 % (ref 0–6)
EOSINOPHIL NFR BLD AUTO: 1 % (ref 0–6)
ERYTHROCYTE [DISTWIDTH] IN BLOOD BY AUTOMATED COUNT: 13.4 % (ref 11.6–15.1)
ERYTHROCYTE [DISTWIDTH] IN BLOOD BY AUTOMATED COUNT: 13.5 % (ref 11.6–15.1)
ERYTHROCYTE [DISTWIDTH] IN BLOOD BY AUTOMATED COUNT: 13.7 % (ref 11.6–15.1)
FIBRINOGEN PPP-MCNC: 469 MG/DL (ref 207–520)
HCT VFR BLD AUTO: 19 % (ref 34.8–46.1)
HCT VFR BLD AUTO: 20.6 % (ref 34.8–46.1)
HCT VFR BLD AUTO: 27.8 % (ref 34.8–46.1)
HGB BLD-MCNC: 6.4 G/DL (ref 11.5–15.4)
HGB BLD-MCNC: 7.2 G/DL (ref 11.5–15.4)
HGB BLD-MCNC: 9.4 G/DL (ref 11.5–15.4)
IMM GRANULOCYTES # BLD AUTO: 0.03 THOUSAND/UL (ref 0–0.2)
IMM GRANULOCYTES # BLD AUTO: 0.06 THOUSAND/UL (ref 0–0.2)
IMM GRANULOCYTES # BLD AUTO: 0.08 THOUSAND/UL (ref 0–0.2)
IMM GRANULOCYTES NFR BLD AUTO: 0 % (ref 0–2)
IMM GRANULOCYTES NFR BLD AUTO: 1 % (ref 0–2)
IMM GRANULOCYTES NFR BLD AUTO: 1 % (ref 0–2)
INR PPP: 1.05 (ref 0.84–1.19)
LYMPHOCYTES # BLD AUTO: 0.85 THOUSANDS/ÂΜL (ref 0.6–4.47)
LYMPHOCYTES # BLD AUTO: 1.8 THOUSANDS/ÂΜL (ref 0.6–4.47)
LYMPHOCYTES # BLD AUTO: 2 THOUSANDS/ÂΜL (ref 0.6–4.47)
LYMPHOCYTES NFR BLD AUTO: 13 % (ref 14–44)
LYMPHOCYTES NFR BLD AUTO: 19 % (ref 14–44)
LYMPHOCYTES NFR BLD AUTO: 8 % (ref 14–44)
MCH RBC QN AUTO: 31.1 PG (ref 26.8–34.3)
MCH RBC QN AUTO: 31.2 PG (ref 26.8–34.3)
MCH RBC QN AUTO: 31.7 PG (ref 26.8–34.3)
MCHC RBC AUTO-ENTMCNC: 33.7 G/DL (ref 31.4–37.4)
MCHC RBC AUTO-ENTMCNC: 33.8 G/DL (ref 31.4–37.4)
MCHC RBC AUTO-ENTMCNC: 35 G/DL (ref 31.4–37.4)
MCV RBC AUTO: 91 FL (ref 82–98)
MCV RBC AUTO: 92 FL (ref 82–98)
MCV RBC AUTO: 93 FL (ref 82–98)
MONOCYTES # BLD AUTO: 0.7 THOUSAND/ÂΜL (ref 0.17–1.22)
MONOCYTES # BLD AUTO: 0.71 THOUSAND/ÂΜL (ref 0.17–1.22)
MONOCYTES # BLD AUTO: 1.19 THOUSAND/ÂΜL (ref 0.17–1.22)
MONOCYTES NFR BLD AUTO: 7 % (ref 4–12)
MONOCYTES NFR BLD AUTO: 7 % (ref 4–12)
MONOCYTES NFR BLD AUTO: 8 % (ref 4–12)
NEUTROPHILS # BLD AUTO: 11.13 THOUSANDS/ÂΜL (ref 1.85–7.62)
NEUTROPHILS # BLD AUTO: 7.54 THOUSANDS/ÂΜL (ref 1.85–7.62)
NEUTROPHILS # BLD AUTO: 8.79 THOUSANDS/ÂΜL (ref 1.85–7.62)
NEUTS SEG NFR BLD AUTO: 72 % (ref 43–75)
NEUTS SEG NFR BLD AUTO: 78 % (ref 43–75)
NEUTS SEG NFR BLD AUTO: 85 % (ref 43–75)
NRBC BLD AUTO-RTO: 0 /100 WBCS
PLATELET # BLD AUTO: 182 THOUSANDS/UL (ref 149–390)
PLATELET # BLD AUTO: 185 THOUSANDS/UL (ref 149–390)
PLATELET # BLD AUTO: 262 THOUSANDS/UL (ref 149–390)
PMV BLD AUTO: 10 FL (ref 8.9–12.7)
PMV BLD AUTO: 10 FL (ref 8.9–12.7)
PMV BLD AUTO: 9.5 FL (ref 8.9–12.7)
PROTHROMBIN TIME: 13.9 SECONDS (ref 11.6–14.5)
RBC # BLD AUTO: 2.05 MILLION/UL (ref 3.81–5.12)
RBC # BLD AUTO: 2.27 MILLION/UL (ref 3.81–5.12)
RBC # BLD AUTO: 3.02 MILLION/UL (ref 3.81–5.12)
WBC # BLD AUTO: 10.37 THOUSAND/UL (ref 4.31–10.16)
WBC # BLD AUTO: 10.39 THOUSAND/UL (ref 4.31–10.16)
WBC # BLD AUTO: 14.22 THOUSAND/UL (ref 4.31–10.16)

## 2024-04-24 PROCEDURE — 99024 POSTOP FOLLOW-UP VISIT: CPT | Performed by: NURSE PRACTITIONER

## 2024-04-24 PROCEDURE — NC001 PR NO CHARGE: Performed by: NURSE PRACTITIONER

## 2024-04-24 PROCEDURE — 85025 COMPLETE CBC W/AUTO DIFF WBC: CPT | Performed by: OBSTETRICS & GYNECOLOGY

## 2024-04-24 PROCEDURE — 85025 COMPLETE CBC W/AUTO DIFF WBC: CPT

## 2024-04-24 RX ADMIN — DOCUSATE SODIUM 100 MG: 100 CAPSULE, LIQUID FILLED ORAL at 17:25

## 2024-04-24 RX ADMIN — IRON SUCROSE 200 MG: 20 INJECTION, SOLUTION INTRAVENOUS at 17:44

## 2024-04-24 RX ADMIN — DOCUSATE SODIUM 100 MG: 100 CAPSULE, LIQUID FILLED ORAL at 09:28

## 2024-04-24 RX ADMIN — Medication 62.5 MILLI-UNITS/MIN: at 01:06

## 2024-04-24 RX ADMIN — IBUPROFEN 600 MG: 600 TABLET ORAL at 03:39

## 2024-04-24 RX ADMIN — IBUPROFEN 600 MG: 600 TABLET ORAL at 09:18

## 2024-04-24 RX ADMIN — BENZOCAINE AND LEVOMENTHOL 1 APPLICATION: 200; 5 SPRAY TOPICAL at 04:03

## 2024-04-24 RX ADMIN — IBUPROFEN 600 MG: 600 TABLET ORAL at 22:42

## 2024-04-24 RX ADMIN — IBUPROFEN 600 MG: 600 TABLET ORAL at 17:25

## 2024-04-24 NOTE — PROGRESS NOTES
POSTPARTUM NOTE  Sharonda Kaplan 18 y.o. female MRN: [id number]  Unit/Bed#: -01 Encounter: [id number]    Sharonda Kaplan is a   at postpartum day 1 from a vaginal delivery on 2024 at 2237.  Baby is at bedside.  Complications included: postpartum hemorrhage (GXI=7178, s/p TXA, methergine, pitocin); multiple labial and vaginal lacerations (repaired, vaginal packing and bass in place)    Review of Systems:   -Constitutional: denies issues, reports pain currently 1-2/10   -Breasts: denies tenderness   -Cardiovascular: denies chest pain or SOB   -Gynecologic: lochia moderate   -Urinary: bass in place   -GI: tolerating PO, passing flatus    Physical Exam:   -Vitals:   Vitals:    24 0052 24 0110 24 0145 24 0700   BP: 114/67  130/71 119/82   BP Location:    Right arm   Pulse: (!) 112  (!) 118 97   Resp:    16   Temp:  98.6 °F (37 °C)  98 °F (36.7 °C)   TempSrc:  Oral  Oral   Weight:       Height:          -General: A&Ox3, no acute distress noted   -Pulmonary: normal effort, no SOB noted   -Cardiovascular: regular HR   -Abdomen: soft, non-tender, non-distended, + bowel sounds x4 quadrants, uterine fundus firm @-1 cm below the umbilicus   -Extremities: nontender, trace pedal edema noted   -Breasts: deferred   -Pelvic exam:    External genitalia: R labial edema present, bilateral labial and vaginal suture intact without signs of infection, no active bleeding from lacerations noted    Results from last 7 days   Lab Units 24  2358 24  2210   HEMOGLOBIN g/dL 9.4* 10.0*   HEMATOCRIT % 27.8* 28.3*   MCV fL 92 89       Assessment/Plan:  1. Continue routine postpartum care.  Ambulation and self-care encouraged.  2. Contraception: Desires OCP's.  3. Feeding infant via breast.  4. She is Rh positive.  Rhogam is not indicated.  5. Vaccine status:  Needs hepatitis B vaccine booster.  Order placed.  6. Bass catheter and vaginal packing removed.  Advised use of ice packs to  labia.    JOY Castillo  4/24/2024

## 2024-04-24 NOTE — DISCHARGE SUMMARY
Discharge Summary - Sharonda Kaplan 18 y.o. female MRN: [id number]    Unit/Bed#: -01 Encounter: [id number]    ADMISSION  Admission Date: 2024   Admitting Attending: Dr. Clementina Matthew MD    Patient Active Problem List   Diagnosis    Chronic constipation    Tension type headache    Tonsillolith    Current moderate episode of major depressive disorder without prior episode (HCC)    Anxiety    Suicidal behavior without attempted self-injury    Anxiety and depression    Breast feeding status of mother    39 weeks gestation of pregnancy    Diet controlled gestational diabetes mellitus (GDM) in third trimester    Anemia affecting pregnancy in third trimester    Decreased fetal movement affecting management of mother, antepartum       DELIVERY  Delivery Method: Vaginal, Spontaneous    Delivery Date and Time: 2024  10:37 PM   Delivery Attending: Clementina Matthew     DISCHARGE  Discharge Date: 24  Discharge Attending: Dr. Starkey  Discharge Diagnosis:   Same, Delivered    Clinical course: Admission to Delivery  Ms. Sharonda Kaplan is a 18 y.o.  at 39wk2d. She presented to labor and delivery for an induction of labor for A1GDM and decreased fetal movement at term. Her induction was started with Cytotec and a bass balloon, and later pitocin. She was artificially ruptured for clear fluid. She received an epidural for analgesia. She progressed to complete dilation and began to push.       Delivery  Route of Delivery: Vaginal, Spontaneous     Anesthesia: Epidural ,   QBL: Non-Surgical QBL (mL): 1133        Delivery: Vaginal, Spontaneous  at 2024  10:37 PM   Laceration: Labial laceration, multiple vaginal lacerations    Baby's Weight: 3190 g (7 lb 0.5 oz) ; 112.52     Apgar scores: 8  and 9  at 1 and 5 minutes, respectively      Clinical Course: Post-Delivery:  The post delivery course was notable for postpartum hemorrhage due to lacerations and initial uterine atony. She was given TXA,  methergine, and extra pitocin.  The vagina was packed and a bass catheter was placed to monitor urine output. The next day, packing was removed and bleeding was noted to be stable and bass was removed and she passed her void trial. Her Hgb started at 10.0 and dropped as low as 6.4. She received 2/3 of 1 unit of PRBCs and then began feeling shaky and had chills so the transfusion was stopped. Immune mediated transfusion reaction was excluded with labwork and additional labwork also excluded non-immune mediated transfusion reaction. She then received 2 units of PRBCs at a slower rate and tolerated it well.     On the day of discharge, the patient was ambulating, voiding spontaneously, tolerating oral intake, and hemodynamically stable. She was able to reasonably perform all ADLs. She had appropriate bowel function. Pain was well-controlled. She was discharged home on postpartum/postop day #2 without complications. Patient was instructed to follow up with her OB as an outpatient and was given appropriate warnings to call her provider with problems or concerns.    Pertinent lab findings included:   Blood type O positive.     Last three Hgb values:  Lab Results   Component Value Date    HGB 9.4 (L) 2024    HGB 10.0 (L) 2024    HGB 10.2 (L) 2024        Problem-specific follow-up plans included the following:  Problem List       Chronic constipation    Tension type headache    Tonsillolith    Current moderate episode of major depressive disorder without prior episode (HCC)    Current Assessment & Plan     No currently on medication  Early EPDS postpartum   Does have hx of prior suicide attempt, but no active or recent thoughts of SI or HI         Anxiety    Suicidal behavior without attempted self-injury    Anxiety and depression    Breast feeding status of mother    * (Principal)  (spontaneous vaginal delivery)    Overview     Prenatal labs wnl  [X]MSAFP negative   [X] TDAP vaccine   [X]Delivery  consent  GBS positive, PCN during labor  [ ]Contraception   4/9: EFW 52%, VTX  Valtrex ordered for suppression at 36 weeks  Would like to await spontaneous labor, but IOL if not delivered by due date             Current Assessment & Plan     Admit to OBGYN for IOL for A1GDM, DFM @ term  Clear liquid diet   F/u T&S, CBC, RPR   IVF LR 125cc/hr   Continuous fetal monitoring and tocometry   Analgesia at maternal request   Vertex by TAUS  Induction plan: bass, cytotec followed by pitocin            Diet controlled gestational diabetes mellitus (GDM) in third trimester    Current Assessment & Plan         Recent Labs     04/22/24  2222   POCGLU 102     Diet controlled  POCT in labor  Discussed insulin gtt if persistently elevated  Last meal at 1830 hrs this evening  Diabetic clear liquid diet in labor         Anemia affecting pregnancy in third trimester    Overview     3/1/24 Hgb 9.6 ->10.2  Taking oral iron         Current Assessment & Plan     4/4 Hgb 10.2, on PO iron  Admission Hgb  10.0         Decreased fetal movement affecting management of mother, antepartum    Current Assessment & Plan     Improved from triage visit earlier today         Postpartum hemorrhage    Current Assessment & Plan     PPH: QBL 1133 labial, multiple vaginal lacs  initial atony; s/p TXA, methergine, pitocin; packing and bass in place after delivery with plan for removal PPD#1  Coags wnl, Fibringoen 469 after delivery.   Post delivery Hgb 9.4. Hgb on admit 10.0                Medication List      CONTINUE taking these medications     OneTouch Delica Plus [id number] Misc; Test 4 times daily   OneTouch Verio Flex System w/Device Kit; Test 4 times daily     ASK your doctor about these medications     Iron (Ferrous Sulfate) 325 (65 Fe) MG Tabs   omeprazole 20 mg delayed release capsule; Commonly known as: PriLOSEC;   TAKE 1 CAPSULE(20 MG) BY MOUTH DAILY   OneTouch Verio test strip; Generic drug: glucose blood; Test 4 times   daily   PRENATAL 1  PO   valACYclovir 500 mg tablet; Commonly known as: VALTREX; Take 2 tablets   (1,000 mg total) by mouth daily for 28 days       Condition at discharge:   good     Disposition:   See After Visit Summary for discharge disposition information.    Planned Readmission:   No

## 2024-04-24 NOTE — L&D DELIVERY NOTE
Vaginal Delivery Summary - OB/GYN   Sharonda Kaplan 18 y.o. female MRN: [id number]  Unit/Bed#: -01 Encounter: [id number]          Predelivery Diagnosis:  1. Pregnancy at 39w2d  2. GBS positive   3. A1GDM  4. History of HSV  5. History of Depression  6. Anemia    Postdelivery Diagnosis:  1. Same as above  2. Delivery of term   3. Postpartum hemorrhage    Procedure: Spontaneous Vaginal Delivery, repair of bilateral labial lacerations and multiple vaginal lacerations    Attending: Dr. Matthew    Assistant: Dr. Thomas    Anesthesia: Epidural    QBL: 1133 cc  Admission Hg: 10.0  Admission platelets: 262    Complications: none apparent    Specimens: cord blood, arterial and venous cord blood gasses, placenta to storage    Findings:   1. Viable male at [address]82, with APGARS of 8 and 9 at 1 and 5 minutes respectively,  2. Spontaneous delivery of intact placenta at 2241  3. Bilateral labial lacerations and multiple vaginal lacerations repaired with 3-0 Vicryl; oozing noted after extensive repair, vaginal packing placed along with bass catheter  4. Blood gases:    Umbilical Cord Venous Blood Gas:  Results from last 7 days   Lab Units 24  2238   PH COV  7.330   PCO2 COV mm HG 36.0   HCO3 COV mmol/L 18.6   BASE EXC COV mmol/L -6.6*   O2 CT CD VB mL/dL 11.1   O2 HGB, VENOUS CORD % 59.0     Umbilical Cord Arterial Blood Gas:  Results from last 7 days   Lab Units 24  2238   PH COA  7.217*   PCO2 COA  49.0   PO2 COA mm HG 12.6   HCO3 COA mmol/L 19.5   BASE EXC COA mmol/L -8.4*   O2 CONTENT CORD ART ml/dl 3.6   O2 HGB, ARTERIAL CORD % 18.8       Disposition:  Patient tolerated the procedure well and was recovering in labor and delivery room     Brief history and labor course:  Ms. Sharonda Kaplan is a 18 y.o.  at 39wk2d. She presented to labor and delivery for an induction of labor for A1GDM and decreased fetal movement at term. Her induction was started with Cytotec and a bass balloon, and later  pitocin. She was artificially ruptured for clear fluid. She received an epidural for analgesia. She progressed to complete dilation and began to push.    Description of procedure    After pushing for 8 minutes, at 2237 patient delivered a viable male , wt pending, apgars of 8 (1 min) and 9 (5 min). The fetal vertex delivered spontaneously. Baby was checked for nuchal. No nuchal cord was present. The anterior shoulder delivered atraumatically with maternal expulsive forces and the assistance of downward traction. The posterior shoulder delivered with maternal expulsive forces and the assistance of upward traction. The remainder of the fetus delivered spontaneously.     Upon delivery, the infant was placed on the mothers abdomen and the cord was clamped and cut. Delayed cord clamping was performed.  The infant was noted to cry spontaneously and was moving all extremities appropriately. There was no evidence for injury. Awaiting nurse resuscitators evaluated the . Arterial and venous cord blood gases and cord blood was collected for analysis. These were promptly sent to the lab. In the immediate post-partum, 30 units of IV pitocin was administered, and the uterus was noted to contract down well with massage and pitocin. The placenta delivered spontaneously at 2241 and was noted to have a centrally inserted 3 vessel cord. Uterine atony was noted after delivery of the placenta, which was controlled with pitocin, removal of a blood clot from the lower uterine segment, and methergine.    The vagina, cervix, perineum, and rectum were inspected and there was noted to be bilateral labial lacerations and multiple vaginal lacerations that were bleeding heavily. IV TXA was administered. The lacerations were repaired in a running locked fashion using 3-0 Vicryl and 3-0 Vicryl Rapide. The vaginal tissue was noted to be friable and oozing after repair. Vaginal packing and bass catheter were inserted to enhance  hemostasis, and stat CBC, fibrinogen, PT and PTT were obtained.    At the conclusion of the procedure, all needle, sponge, and instrument counts were noted to be correct. Patient tolerated the procedure well and was allowed to recover in labor and delivery room with family and  before being transferred to the post-partum floor. I was present and participated in all key portions of the case.        Clementina Matthew MD  2024  11:37 PM

## 2024-04-24 NOTE — CASE MANAGEMENT
Case Management Assessment    Patient name Sharonda Kaplan  Location /-01 MRN [id number]  : 2005 Date 2024       Current Admission Date: 2024  Current Admission Diagnosis: (spontaneous vaginal delivery)   Patient Active Problem List    Diagnosis Date Noted    Postpartum hemorrhage 2024    Decreased fetal movement affecting management of mother, antepartum 2024    Anemia affecting pregnancy in third trimester 2024    Diet controlled gestational diabetes mellitus (GDM) in third trimester 2024    Breast feeding status of mother 2024     (spontaneous vaginal delivery) 2024    Anxiety and depression 2021    Current moderate episode of major depressive disorder without prior episode (HCC) 2018    Anxiety 2018    Suicidal behavior without attempted self-injury 2018    Chronic constipation 01/15/2018    Tension type headache 01/15/2018    Tonsillolith 2015      LOS (days): 2  Geometric Mean LOS (GMLOS) (days):   Days to GMLOS:     OBJECTIVE:    Risk of Unplanned Readmission Score: 4.36         Current admission status: Inpatient       Preferred Pharmacy:   [id number] [address] [address]  Phone: [phone] Fax: [phone]    [id number] [address] [address]  Phone: [phone] Fax: [phone]    Primary Care Provider: JOY Eaton    Primary Insurance: [employer]  Secondary Insurance:     ASSESSMENT:    CM received consult for breast pump. MOB requesting Zomee Z2  pump. CM placed order via waygum. Order approved. CM delivered pump to MOB's room. Delivery ticket signed and placed in bin for DME liaison.           Social Determinants of Health (SDOH)      Flowsheet Row Most Recent Value   Housing Stability    In the last 12  months, was there a time when you were not able to pay the mortgage or rent on time? N   In the last 12 months, how many places have you lived? 1   In the last 12 months, was there a time when you did not have a steady place to sleep or slept in a shelter (including now)? N   Transportation Needs    In the past 12 months, has lack of transportation kept you from medical appointments or from getting medications? no   In the past 12 months, has lack of transportation kept you from meetings, work, or from getting things needed for daily living? No   Food Insecurity    Within the past 12 months, you worried that your food would run out before you got the money to buy more. Never true   Within the past 12 months, the food you bought just didn't last and you didn't have money to get more. Never true   Utilities    In the past 12 months has the electric, gas, oil, or water company threatened to shut off services in your home? No

## 2024-04-24 NOTE — ANESTHESIA POSTPROCEDURE EVALUATION
"Post-Op Assessment Note    CV Status:  Stable    Pain management: adequate      Post-op block assessment: no complications and catheter intact   Mental Status:  Alert and awake   Hydration Status:  Euvolemic   PONV Controlled:  Controlled   Airway Patency:  Patent     Post Op Vitals Reviewed: Yes    No anethesia notable event occurred.    Staff: Anesthesiologist           /71   Pulse (!) 118   Temp 98.6 °F (37 °C) (Oral)   Resp 16   Ht 4' 11\" (1.499 m)   Wt 65.8 kg (145 lb)   LMP 07/04/2023   Breastfeeding Yes   BMI 29.29 kg/m²        "

## 2024-04-24 NOTE — PLAN OF CARE
Problem: PAIN - ADULT  Goal: Verbalizes/displays adequate comfort level or baseline comfort level  Description: Interventions:  - Encourage patient to monitor pain and request assistance  - Assess pain using appropriate pain scale  - Administer analgesics based on type and severity of pain and evaluate response  - Implement non-pharmacological measures as appropriate and evaluate response  - Consider cultural and social influences on pain and pain management  - Notify physician/advanced practitioner if interventions unsuccessful or patient reports new pain  Outcome: Progressing     Problem: INFECTION - ADULT  Goal: Absence or prevention of progression during hospitalization  Description: INTERVENTIONS:  - Assess and monitor for signs and symptoms of infection  - Monitor lab/diagnostic results  - Monitor all insertion sites, i.e. indwelling lines, tubes, and drains  - Monitor endotracheal if appropriate and nasal secretions for changes in amount and color  - [place] appropriate cooling/warming therapies per order  - Administer medications as ordered  - Instruct and encourage patient and family to use good hand hygiene technique  - Identify and instruct in appropriate isolation precautions for identified infection/condition  Outcome: Progressing     Problem: SAFETY ADULT  Goal: Maintain or return to baseline ADL function  Description: INTERVENTIONS:  -  Assess patient's ability to carry out ADLs; assess patient's baseline for ADL function and identify physical deficits which impact ability to perform ADLs (bathing, care of mouth/teeth, toileting, grooming, dressing, etc.)  - Assess/evaluate cause of self-care deficits   - Assess range of motion  - Assess patient's mobility; develop plan if impaired  - Assess patient's need for assistive devices and provide as appropriate  - Encourage maximum independence but intervene and supervise when necessary  - Involve family in performance of ADLs  - Assess for home care  needs following discharge   - Consider OT consult to assist with ADL evaluation and planning for discharge  - Provide patient education as appropriate  Outcome: Progressing     Problem: Knowledge Deficit  Goal: Patient/family/caregiver demonstrates understanding of disease process, treatment plan, medications, and discharge instructions  Description: Complete learning assessment and assess knowledge base.  Interventions:  - Provide teaching at level of understanding  - Provide teaching via preferred learning methods  Outcome: Progressing     Problem: DISCHARGE PLANNING  Goal: Discharge to home or other facility with appropriate resources  Description: INTERVENTIONS:  - Identify barriers to discharge w/patient and caregiver  - Arrange for needed discharge resources and transportation as appropriate  - Identify discharge learning needs (meds, wound care, etc.)  - Arrange for interpretive services to assist at discharge as needed  - Refer to Case Management Department for coordinating discharge planning if the patient needs post-hospital services based on physician/advanced practitioner order or complex needs related to functional status, cognitive ability, or social support system  Outcome: Progressing     Problem: POSTPARTUM  Goal: Experiences normal postpartum course  Description: INTERVENTIONS:  - Monitor maternal vital signs  - Assess uterine involution and lochia  Outcome: Progressing  Goal: Appropriate maternal -  bonding  Description: INTERVENTIONS:  - Identify family support  - Assess for appropriate maternal/infant bonding   -Encourage maternal/infant bonding opportunities  - Referral to  or  as needed  Outcome: Progressing  Goal: Establishment of infant feeding pattern  Description: INTERVENTIONS:  - Assess breast/bottle feeding  - Refer to lactation as needed  Outcome: Progressing  Goal: Incision(s), wounds(s) or drain site(s) healing without S/S of infection  Description:  INTERVENTIONS  - Assess and document dressing, incision, wound bed, drain sites and surrounding tissue  - Provide patient and family education  - Perform skin care/dressing changes every day  Outcome: Progressing

## 2024-04-24 NOTE — OB LABOR/OXYTOCIN SAFETY PROGRESS
Labor Progress Note - Sharonda Kaplan 18 y.o. female MRN: [id number]    Unit/Bed#: -01 Encounter: [id number]                Cervical Dilation: 1-2        Cervical Effacement: 70  Fetal Station: -1     Fetal Heart Rate (FHT): 155 BPM  FHR Category: 1             Vital Signs:   Vitals:    04/22/24 2201   BP: 123/68   Pulse: 98   Resp: 16   Temp: 98.3 °F (36.8 °C)       Notes/comments:   IOL consent signed and reviewed with patient. Plan for FB & cytotec placement  Pt agreeable. Will reassess when balloon expelled. D/w Dr. Toussaint-Foster    PROCEDURE:  FLOREZ BALLOON PLACEMENT    A 24F florez with a 30cc balloon was selected, a speculum examination was performed and the cervix was located. A florez balloon was introduced over sterile gloved hands. Balloon advanced through cervix with a ringed forceps beyond the internal cervical os. A small amount amount of sterile saline solution was instilled in the balloon to confirm placement. Placement was confirmed to be beyond the internal cervical os. A total of 60cc of sterile saline solution was placed into the balloon. Pt tolerated well. Instructions left with RN to place florez to gravity with a 1L bag of IV fluid. Notify DO/MD when florez dislodged.      Isaac Thomas DO 4/23/2024 12:10 AM      
Labor Progress Note - Sharonda Kaplan 18 y.o. female MRN: [id number]    Unit/Bed#: -01 Encounter: [id number]       Contraction Frequency (minutes): 1-7  Contraction Intensity: Mild  Uterine Activity Characteristics: Irregular  Cervical Dilation: 3        Cervical Effacement: 80  Fetal Station: -1  Baseline Rate (FHR): 135 bpm  Fetal Heart Rate (FHT): 155 BPM  FHR Category: 1               Vital Signs:   Vitals:    04/23/24 0502   BP: 118/64   Pulse: 87   Resp:    Temp:        Notes/comments:   Notified by RN balloon expelled.  SVE as above.  Plan to start Pitocin titration.  Bundle completed and order placed.  Discussed with patient that we will likely defer her next check while her Pitocin is getting started and will reassess in several hours or sooner if needed. Dr. Toussaint-Foster aware    Isaac Thomas,  4/23/2024 5:31 AM      
Oxytocin Safety Progress Check Note - Sharonda Kaplan 18 y.o. female MRN: [id number]    Unit/Bed#: -01 Encounter: [id number]    Dose (nandini-units/min) Oxytocin: 18 nandini-units/min  Contraction Frequency (minutes): 1.5-2 (difficulty tracing contractions due to maternal position)  Contraction Intensity: Strong  Uterine Activity Characteristics: Regular  Cervical Dilation: 10  Dilation Complete Date: 04/23/24  Dilation Complete Time: 2140  Cervical Effacement: 100  Fetal Station: 2  Baseline Rate (FHR): 145 bpm  Fetal Heart Rate (FHT): 135 BPM  FHR Category: 1               Vital Signs:   Vitals:    04/23/24 2124   BP: 124/67   Pulse: (!) 127   Resp:    Temp:        Notes/comments:   SVE as above. Plan to review and start pushing. Dr. Matthew aware.     Isaac Thomas, DO 4/23/2024 9:46 PM      
Oxytocin Safety Progress Check Note - Sharonda Kaplan 18 y.o. female MRN: [id number]    Unit/Bed#: -01 Encounter: [id number]    Dose (nandini-units/min) Oxytocin: 10 nandini-units/min  Contraction Frequency (minutes): 130  Contraction Intensity: Mild  Uterine Activity Characteristics: Regular  Cervical Dilation: 4        Cervical Effacement: 80  Fetal Station: -1  Baseline Rate (FHR): 130 bpm  Fetal Heart Rate (FHT): 135 BPM  FHR Category: 2               Vital Signs:   Vitals:    04/23/24 1504   BP: 116/72   Pulse: 104   Resp:    Temp: 98.4 °F (36.9 °C)       Notes/comments:   SVE as above. Patient now requesting epidural. Category II tracing due to occasional late decelerations, improved with repositioning. D/w Dr. Matthew.    Cecile Dang MD 4/23/2024 3:15 PM      
Oxytocin Safety Progress Check Note - Sharonda Kaplan 18 y.o. female MRN: [id number]    Unit/Bed#: -01 Encounter: [id number]    Dose (nandini-units/min) Oxytocin: 6 nandini-units/min  Contraction Frequency (minutes): 2-4.5  Contraction Intensity: Mild  Uterine Activity Characteristics: Regular  Cervical Dilation: 3        Cervical Effacement: 80  Fetal Station: -1  Baseline Rate (FHR): 135 bpm  Fetal Heart Rate (FHT): 135 BPM  FHR Category: 1               Vital Signs:   Vitals:    04/23/24 0946   BP: 115/70   Pulse: 93   Resp:    Temp:        Notes/comments:   SVE as above. AROM for clear fluid. Category I tracing. Continue pitocin titration. D/w Dr. Vipul Dang MD 4/23/2024 10:06 AM      
Oxytocin Safety Progress Check Note - Sharonda Kaplan 18 y.o. female MRN: [id number]    Unit/Bed#: -01 Encounter: [id number]    Dose (nandini-units/min) Oxytocin: 4 nandini-units/min  Contraction Frequency (minutes): 1.5-2  Contraction Intensity: Mild  Uterine Activity Characteristics: Regular  Cervical Dilation: 3        Cervical Effacement: 80  Fetal Station: -1  Baseline Rate (FHR): 135 bpm  Fetal Heart Rate (FHT): 155 BPM  FHR Category: 1               Vital Signs:   Vitals:    04/23/24 0801   BP: 102/60   Pulse: 84   Resp:    Temp:        Notes/comments:   SVE deferred. Discussed AROM with patient and she would prefer to wait for now. Patient has been on pitocin for approximately 2 hours and 15 minutes. Will re-address AROM at next check. D/w Dr. Vipul Dang MD 4/23/2024 8:15 AM      
Oxytocin Safety Progress Check Note - Sharonda Kaplan 18 y.o. female MRN: [id number]    Unit/Bed#: -01 Encounter: [id number]    Dose (nandini-units/min) Oxytocin: 8 nandini-units/min  Contraction Frequency (minutes): 2-3.5  Contraction Intensity: Mild  Uterine Activity Characteristics: Regular  Cervical Dilation: 3        Cervical Effacement: 80  Fetal Station: -1  Baseline Rate (FHR): 140 bpm  Fetal Heart Rate (FHT): 135 BPM  FHR Category: 1               Vital Signs:   Vitals:    04/23/24 1218   BP: 103/69   Pulse: 102   Resp:    Temp:        Notes/comments:   SVE deferred. Category I tracing. Patient ella every 1-3 minutes.       Cecile Dang MD 4/23/2024 12:36 PM      
Oxytocin Safety Progress Check Note - Sharonda Kaplan 18 y.o. female MRN: [id number]    Unit/Bed#: -01 Encounter: [id number]    Dose (nandini-units/min) Oxytocin: 14 nandini-units/min  Contraction Frequency (minutes): 1.5-3  Contraction Intensity: Moderate/Strong  Uterine Activity Characteristics: Regular  Cervical Dilation: 5-6        Cervical Effacement: 80  Fetal Station: 1  Baseline Rate (FHR): 125 bpm  Fetal Heart Rate (FHT): 135 BPM  FHR Category: 1               Vital Signs:   Vitals:    04/23/24 1725   BP: 121/64   Pulse: (!) 118   Resp:    Temp:        Notes/comments:   SVE as above. Pt comfortable with epidural, but feeling more nauseous, not due for zofran yet. Reglan administered,  Continue pitocin titration as tolerated. D/w Dr. Vipul Thomas, DO 4/23/2024 5:52 PM      
yes

## 2024-04-24 NOTE — PLAN OF CARE
Problem: PAIN - ADULT  Goal: Verbalizes/displays adequate comfort level or baseline comfort level  Description: Interventions:  - Encourage patient to monitor pain and request assistance  - Assess pain using appropriate pain scale  - Administer analgesics based on type and severity of pain and evaluate response  - Implement non-pharmacological measures as appropriate and evaluate response  - Consider cultural and social influences on pain and pain management  - Notify physician/advanced practitioner if interventions unsuccessful or patient reports new pain  Outcome: Progressing     Problem: INFECTION - ADULT  Goal: Absence or prevention of progression during hospitalization  Description: INTERVENTIONS:  - Assess and monitor for signs and symptoms of infection  - Monitor lab/diagnostic results  - Monitor all insertion sites, i.e. indwelling lines, tubes, and drains  - Monitor endotracheal if appropriate and nasal secretions for changes in amount and color  - [place] appropriate cooling/warming therapies per order  - Administer medications as ordered  - Instruct and encourage patient and family to use good hand hygiene technique  - Identify and instruct in appropriate isolation precautions for identified infection/condition  Outcome: Progressing  Goal: Absence of fever/infection during neutropenic period  Description: INTERVENTIONS:  - Monitor WBC    Outcome: Progressing     Problem: SAFETY ADULT  Goal: Patient will remain free of falls  Description: INTERVENTIONS:  - Educate patient/family on patient safety including physical limitations  - Instruct patient to call for assistance with activity   - Consult OT/PT to assist with strengthening/mobility   - Keep Call bell within reach  - Keep bed low and locked with side rails adjusted as appropriate  - Keep care items and personal belongings within reach  - Initiate and maintain comfort rounds  Outcome: Progressing  Goal: Maintain or return to baseline ADL  function  Description: INTERVENTIONS:  -  Assess patient's ability to carry out ADLs; assess patient's baseline for ADL function and identify physical deficits which impact ability to perform ADLs (bathing, care of mouth/teeth, toileting, grooming, dressing, etc.)  - Assess/evaluate cause of self-care deficits   - Assess range of motion  - Assess patient's mobility; develop plan if impaired  - Assess patient's need for assistive devices and provide as appropriate  - Encourage maximum independence but intervene and supervise when necessary  - Involve family in performance of ADLs  - Assess for home care needs following discharge   - Consider OT consult to assist with ADL evaluation and planning for discharge  - Provide patient education as appropriate  Outcome: Progressing  Goal: Maintains/Returns to pre admission functional level  Description: INTERVENTIONS:  - Perform AM-PAC 6 Click Basic Mobility/ Daily Activity assessment daily.  - Set and communicate daily mobility goal to care team and patient/family/caregiver.   - Collaborate with rehabilitation services on mobility goals if consulted  - Record patient progress and toleration of activity level   Outcome: Progressing     Problem: Knowledge Deficit  Goal: Patient/family/caregiver demonstrates understanding of disease process, treatment plan, medications, and discharge instructions  Description: Complete learning assessment and assess knowledge base.  Interventions:  - Provide teaching at level of understanding  - Provide teaching via preferred learning methods  Outcome: Progressing     Problem: DISCHARGE PLANNING  Goal: Discharge to home or other facility with appropriate resources  Description: INTERVENTIONS:  - Identify barriers to discharge w/patient and caregiver  - Arrange for needed discharge resources and transportation as appropriate  - Identify discharge learning needs (meds, wound care, etc.)  - Arrange for interpretive services to assist at discharge  as needed  - Refer to Case Management Department for coordinating discharge planning if the patient needs post-hospital services based on physician/advanced practitioner order or complex needs related to functional status, cognitive ability, or social support system  Outcome: Progressing

## 2024-04-24 NOTE — PROGRESS NOTES
CBC reviewed from this morning.  Hgb=7.2.  Patient has been up ambulating without issue.  Voided without problem.  Denies dizziness or lightheadedness.  Will order IV Venofer x1 dose.  Discussed with patient.

## 2024-04-24 NOTE — LACTATION NOTE
This note was copied from a baby's chart.  CONSULT - LACTATION  Baby Boy (Whitley Kaplan 1 days male MRN: [id number]    [facility] NURSERY Room / Bed: (N)/(N) Encounter: [id number]    Maternal Information     MOTHER:  Sharonda Kaplan  Maternal Age: 18 y.o.   OB History: # 1 - Date: 24, Sex: Male, Weight: 3190 g (7 lb 0.5 oz), GA: 39w2d, Delivery: Vaginal, Spontaneous, Apgar1: 8, Apgar5: 9, Living: Living, Birth Comments: None   Previouse breast reduction surgery? No    Lactation history:   Has patient previously breast fed: No   How long had patient previously breast fed:     Previous breast feeding complications:     No past surgical history on file.     Birth information:  YOB: 2026   Time of birth: 10:37 PM   Sex: male   Delivery type: Vaginal, Spontaneous   Birth Weight: 3190 g (7 lb 0.5 oz)   Percent of Weight Change: 0%     Gestational Age: 39w2d   [unfilled]    Assessment     Breast and nipple assessment:  Breasts are pendulous in shape.     Assessment: normal assessment    Feeding assessment: latch difficulty (Short feed, mom is anxious handling her baby and needs a lot of encouragement.)  LATCH:  Latch: Repeated attempts, hold nipple in mouth, stimulate to suck   Audible Swallowing: A few with stimulation   Type of Nipple: Everted (After stimulation)   Comfort (Breast/Nipple): Soft/non-tender   Hold (Positioning): Partial assist, teach one side, mother does other, staff holds   LATCH Score: 7          Feeding recommendations: Place baby at the breast every 2-3 hours and supplement with formula as ordered by Pediatrician.    Met with parents and provided them with the Ready Set Baby Booklet and reviewed information.     Discussed Skin to Skin contact and benefits to mom and baby.  Feeding cues and what that means for recognizing infant's hunger reviewed. Avoidance of pacifiers for the first month discussed. Talked about  exclusive breastfeeding for the first 6 months.    Positioning and latch reviewed as well as showing images of other feeding positions.  Discussed the properties of a good latch in any position. Reviewed hand/manual expression.    Gave information on common concerns, what to expect the first few weeks after delivery, preparing for other caregivers, and how partners can help. Resources for support also provided.    Sharonda was having a difficult time latching her baby at the breast.      Worked with Mom helping her to position her baby up at chest level (using pillow support). Stressed importance of good alignment ( baby's ear, shoulder and hip in a straight line ) and bringing baby to breast and not breast to baby (no hunching over). Then aligning nose to nipple began stroking nipple down to chin to achieve a wide open mouth. Baby's lower lip and chin touching the breast with nipple aimed up towards the roof of baby's mouth and using areolar compression to achieve a deep latch that is comfortable and exchanges optimum amounts of colostrum. Mom was able to get her baby latched on the right side with assistance for 8 minutes using the football hold. Showed parents how to cup feed formula, they state that they are more comfortable using the bottle. Paced bottle feeding reviewed.    The Discharge Breastfeeding Booklet was left at bedside for review,    Encouraged Sharonda to call at next feeding for additional support latching baby.                  Marysol Vicente RN 4/24/2024 11:21 AM

## 2024-04-25 ENCOUNTER — TELEPHONE (OUTPATIENT)
Dept: HEMATOLOGY ONCOLOGY | Facility: CLINIC | Age: 19
End: 2024-04-25

## 2024-04-25 LAB
BILIRUB DIRECT SERPL-MCNC: 0.04 MG/DL (ref 0–0.2)
BILIRUB SERPL-MCNC: 0.24 MG/DL (ref 0.2–1)
ERYTHROCYTE [DISTWIDTH] IN BLOOD BY AUTOMATED COUNT: 14.4 % (ref 11.6–15.1)
HCT VFR BLD AUTO: 19.8 % (ref 34.8–46.1)
HGB BLD-MCNC: 6.7 G/DL (ref 11.5–15.4)
HGB UR QL STRIP.AUTO: ABNORMAL
MCH RBC QN AUTO: 31 PG (ref 26.8–34.3)
MCHC RBC AUTO-ENTMCNC: 33.8 G/DL (ref 31.4–37.4)
MCV RBC AUTO: 92 FL (ref 82–98)
PLATELET # BLD AUTO: 188 THOUSANDS/UL (ref 149–390)
PMV BLD AUTO: 9.9 FL (ref 8.9–12.7)
RBC # BLD AUTO: 2.16 MILLION/UL (ref 3.81–5.12)
RBC, URINE: ABNORMAL
RETICS # AUTO: ABNORMAL 10*3/UL (ref 14097–95744)
RETICS # CALC: 3.49 % (ref 0.37–1.87)
TRANSFUSION STATUS PATIENT QL: NORMAL
WBC # BLD AUTO: 10.56 THOUSAND/UL (ref 4.31–10.16)

## 2024-04-25 PROCEDURE — P9016 RBC LEUKOCYTES REDUCED: HCPCS

## 2024-04-25 PROCEDURE — 88185 FLOWCYTOMETRY/TC ADD-ON: CPT

## 2024-04-25 PROCEDURE — 30233N1 TRANSFUSION OF NONAUTOLOGOUS RED BLOOD CELLS INTO PERIPHERAL VEIN, PERCUTANEOUS APPROACH: ICD-10-PCS | Performed by: OBSTETRICS & GYNECOLOGY

## 2024-04-25 PROCEDURE — 85027 COMPLETE CBC AUTOMATED: CPT

## 2024-04-25 PROCEDURE — 86880 COOMBS TEST DIRECT: CPT | Performed by: OBSTETRICS & GYNECOLOGY

## 2024-04-25 PROCEDURE — 88184 FLOWCYTOMETRY/ TC 1 MARKER: CPT

## 2024-04-25 PROCEDURE — 86901 BLOOD TYPING SEROLOGIC RH(D): CPT | Performed by: OBSTETRICS & GYNECOLOGY

## 2024-04-25 PROCEDURE — 81003 URINALYSIS AUTO W/O SCOPE: CPT | Performed by: OBSTETRICS & GYNECOLOGY

## 2024-04-25 PROCEDURE — 83010 ASSAY OF HAPTOGLOBIN QUANT: CPT

## 2024-04-25 PROCEDURE — 81015 MICROSCOPIC EXAM OF URINE: CPT | Performed by: OBSTETRICS & GYNECOLOGY

## 2024-04-25 PROCEDURE — 86900 BLOOD TYPING SEROLOGIC ABO: CPT | Performed by: OBSTETRICS & GYNECOLOGY

## 2024-04-25 PROCEDURE — 85045 AUTOMATED RETICULOCYTE COUNT: CPT

## 2024-04-25 PROCEDURE — 99024 POSTOP FOLLOW-UP VISIT: CPT | Performed by: OBSTETRICS & GYNECOLOGY

## 2024-04-25 PROCEDURE — 82248 BILIRUBIN DIRECT: CPT

## 2024-04-25 PROCEDURE — 82247 BILIRUBIN TOTAL: CPT

## 2024-04-25 RX ORDER — BENZOCAINE/MENTHOL 6 MG-10 MG
1 LOZENGE MUCOUS MEMBRANE DAILY PRN
Start: 2024-04-25

## 2024-04-25 RX ORDER — DIPHENHYDRAMINE HYDROCHLORIDE 50 MG/ML
25 INJECTION INTRAMUSCULAR; INTRAVENOUS EVERY 6 HOURS PRN
Status: DISCONTINUED | OUTPATIENT
Start: 2024-04-25 | End: 2024-04-26 | Stop reason: HOSPADM

## 2024-04-25 RX ORDER — ACETAMINOPHEN AND CODEINE PHOSPHATE 120; 12 MG/5ML; MG/5ML
1 SOLUTION ORAL DAILY
Qty: 60 TABLET | Refills: 0 | Status: SHIPPED | OUTPATIENT
Start: 2024-04-25 | End: 2024-04-26

## 2024-04-25 RX ORDER — IBUPROFEN 600 MG/1
600 TABLET ORAL EVERY 6 HOURS
Qty: 30 TABLET | Refills: 0 | Status: SHIPPED | OUTPATIENT
Start: 2024-04-25

## 2024-04-25 RX ORDER — DIPHENHYDRAMINE HYDROCHLORIDE 50 MG/ML
25 INJECTION INTRAMUSCULAR; INTRAVENOUS ONCE
Status: CANCELLED | OUTPATIENT
Start: 2024-04-25 | End: 2024-04-25

## 2024-04-25 RX ORDER — ACETAMINOPHEN 325 MG/1
650 TABLET ORAL EVERY 6 HOURS PRN
Start: 2024-04-25

## 2024-04-25 RX ADMIN — ACETAMINOPHEN 650 MG: 325 TABLET, FILM COATED ORAL at 20:02

## 2024-04-25 RX ADMIN — DOCUSATE SODIUM 100 MG: 100 CAPSULE, LIQUID FILLED ORAL at 18:07

## 2024-04-25 RX ADMIN — IBUPROFEN 600 MG: 600 TABLET ORAL at 10:02

## 2024-04-25 RX ADMIN — DIPHENHYDRAMINE HYDROCHLORIDE 25 MG: 50 INJECTION, SOLUTION INTRAMUSCULAR; INTRAVENOUS at 12:15

## 2024-04-25 RX ADMIN — IBUPROFEN 600 MG: 600 TABLET ORAL at 03:49

## 2024-04-25 RX ADMIN — ACETAMINOPHEN 650 MG: 325 TABLET, FILM COATED ORAL at 07:34

## 2024-04-25 RX ADMIN — IBUPROFEN 600 MG: 600 TABLET ORAL at 18:07

## 2024-04-25 RX ADMIN — DOCUSATE SODIUM 100 MG: 100 CAPSULE, LIQUID FILLED ORAL at 10:02

## 2024-04-25 NOTE — QUICK NOTE
Pt evaluated to discussed results of recent labs  She reports that she has been feeling fatigued, but not having any significant pain or bleeding. We reviewed recent Hgb results.     Hgb / Hct       Results from last 7 days   Lab Units 04/24/24 2052 04/24/24  0928 04/23/24  2358 04/22/24  2210   HEMOGLOBIN g/dL 6.4* 7.2* 9.4* 10.0*   HEMATOCRIT % 19.0* 20.6* 27.8* 28.3*     Given her symptomatic anemia in setting of her postpartum hemorrhage, blood transfusion recommended. She is agreeable with plan and was previously consent for blood transfusion.  Dr. Wilson aware    Plan to transfuse 2u pRBC and reasses post transfusion Hgb      Isaac Thomas DO  04/25/24  2:03 AM

## 2024-04-25 NOTE — PROGRESS NOTES
Progress Note - OB/GYN  Sharonda Kaplan 18 y.o. female MRN: [id number]  Unit/Bed#:  419-01 Encounter: [id number]    Assessment and Plan     Sharonda Kaplan is a patient of: [facility] (Dr. Wilson, Dr. Tate, Dr. Matthew). She is PPD# 2 s/p  complicated by postpartum hemorrhage.      *  (spontaneous vaginal delivery)  Assessment & Plan  - Pain well controlled with oral analgesics  - Voiding spontaneously  - Tolerating PO fluids and solids  - Ambulating without difficulty  - Encourage breastfeeding and familial bonding  - Contraception: POPs    Postpartum hemorrhage  Assessment & Plan  PPH: QBL 1133 labial, multiple vaginal lacs  initial atony; s/p TXA, methergine, pitocin; packing and bass in place after delivery with plan for removal PPD#1  Coags wnl, Fibringoen 469 after delivery  - Hgb 10.2 on 4/4 -> 10.0 on admission -> 9.4 -> 7.2 -> 6.4 -> 2 units PRBC ordered  - over MCFP through first unit of PRBC, patient felt shaky and unit was held  - Consider re-starting transfusion at slower rate vs starting PO iron vs venofer  - Asymptomatic    Anemia affecting pregnancy in third trimester  Assessment & Plan  4/4 Hgb 10.2, on PO iron  Admission Hgb  10.0    Diet controlled gestational diabetes mellitus (GDM) in third trimester  Assessment & Plan  2hr GTT outpatient    Current moderate episode of major depressive disorder without prior episode (HCC)  Assessment & Plan  No currently on medication  Early EPDS postpartum with a score of 0  Does have hx of prior suicide attempt, but no active or recent thoughts of SI or HI        Disposition    -  Anticipate discharge home on PPD# 2 - today      Subjective/Objective     Chief Complaint: Postpartum State     Subjective:    Sharonda Kaplan is PPD#2 s/p . She has no current complaints. Patient denies any symptoms of anemia such as lightheadedness, dizziness, shortness of breath, chest pain, palpitations. Pain is well controlled with  "medications.  Patient is currently voiding and ambulating without difficulty.  Patient is currently passing flatus, tolerating PO diet, and denies nausea or vomitting. Patient denies fever, chills, chest pain, shortness of breath, or calf tenderness. Lochia is normal. She is Breastfeeding. She is recovering well and is stable.       Vitals:   /75   Pulse 92   Temp 97.9 °F (36.6 °C) (Oral)   Resp 18   Ht 4' 11\" (1.499 m)   Wt 65.8 kg (145 lb)   LMP 07/04/2023   SpO2 98%   Breastfeeding Yes   BMI 29.29 kg/m²       Intake/Output Summary (Last 24 hours) at 4/25/2024 0648  Last data filed at 4/25/2024 0355  Gross per 24 hour   Intake 200 ml   Output 1950 ml   Net -1750 ml       Invasive Devices       Peripheral Intravenous Line  Duration             Peripheral IV 04/25/24 Right Antecubital <1 day                    Physical Exam:   GEN: Sharonda Kaplan appears well, alert and oriented x 3, pleasant and cooperative   CARDIO: RRR, no murmurs or rubs  RESP:  CTAB, no wheezes or rales  ABDOMEN: soft, no tenderness, no distention, fundus firm and below umbilicus  EXTREMITIES: SCDs on, non tender, no erythema    Labs:     Hemoglobin   Date Value Ref Range Status   04/24/2024 6.4 (L) 11.5 - 15.4 g/dL Final   04/24/2024 7.2 (L) 11.5 - 15.4 g/dL Final   09/25/2015 12.0 11.0 - 15.0 g/dL Final     WBC   Date Value Ref Range Status   04/24/2024 10.37 (H) 4.31 - 10.16 Thousand/uL Final   04/24/2024 14.22 (H) 4.31 - 10.16 Thousand/uL Final   09/25/2015 4.30 (L) 5.00 - 13.00 Thousand/uL Final     Platelets   Date Value Ref Range Status   04/24/2024 182 149 - 390 Thousands/uL Final   04/24/2024 185 149 - 390 Thousands/uL Final   09/25/2015 347 149 - 390 Thousand/uL Final     Creatinine   Date Value Ref Range Status   09/09/2023 0.53 (L) 0.60 - 1.30 mg/dL Final     Comment:     Standardized to IDMS reference method   10/28/2022 0.93 0.60 - 1.30 mg/dL Final     Comment:     Standardized to IDMS reference method "   09/25/2015 0.48 (L) 0.60 - 1.30 mg/dL Final     Comment:     Standardized to IDMS reference method     AST   Date Value Ref Range Status   09/09/2023 19 13 - 39 U/L Final   10/28/2022   Final     Comment:     No result. If an AST is required for patient care, it must be ordered separately.  Specimen collection should occur prior to Sulfasalazine administration due to the potential for falsely depressed results.    09/25/2015 22 5 - 45 U/L Final     ALT   Date Value Ref Range Status   09/09/2023 12 7 - 52 U/L Final     Comment:     Specimen collection should occur prior to Sulfasalazine administration due to the potential for falsely depressed results.    10/28/2022 17 12 - 78 U/L Final     Comment:     Specimen collection should occur prior to Sulfasalazine administration due to the potential for falsely depressed results.    09/25/2015 22 12 - 78 U/L Final          Cecile Dang MD  4/25/2024  6:48 AM

## 2024-04-25 NOTE — TELEPHONE ENCOUNTER
Appointment Change  Cancel, Reschedule, Change to Virtual      Who are you speaking with? Patient   If it is not the patient, is the caller listed on the communication consent form? Yes   Which provider is the appointment scheduled with? Dr Shelly Boyer   When was the original appointment scheduled?    Please list date and time 4/26   At which location is the appointment scheduled to take place? [place]   Was the appointment rescheduled?     Was the appointment changed from an in person visit to a virtual visit?    If so, please list the details of the change. No, will call   What is the reason for the appointment change? Just delivered new baby       Was STAR transport scheduled? No   Does STAR transport need to be scheduled for the new visit (if applicable) No   Does the patient need an infusion appointment rescheduled? No   Does the patient have an upcoming infusion appointment scheduled? If so, when? No   Is the patient undergoing chemotherapy? No   For appointments cancelled with less than 24 hours:  Was the no-show policy reviewed? Yes

## 2024-04-25 NOTE — PLAN OF CARE
Problem: PAIN - ADULT  Goal: Verbalizes/displays adequate comfort level or baseline comfort level  Description: Interventions:  - Encourage patient to monitor pain and request assistance  - Assess pain using appropriate pain scale  - Administer analgesics based on type and severity of pain and evaluate response  - Implement non-pharmacological measures as appropriate and evaluate response  - Consider cultural and social influences on pain and pain management  - Notify physician/advanced practitioner if interventions unsuccessful or patient reports new pain  Outcome: Progressing     Problem: INFECTION - ADULT  Goal: Absence or prevention of progression during hospitalization  Description: INTERVENTIONS:  - Assess and monitor for signs and symptoms of infection  - Monitor lab/diagnostic results  - Monitor all insertion sites, i.e. indwelling lines, tubes, and drains  - Monitor endotracheal if appropriate and nasal secretions for changes in amount and color  - [place] appropriate cooling/warming therapies per order  - Administer medications as ordered  - Instruct and encourage patient and family to use good hand hygiene technique  - Identify and instruct in appropriate isolation precautions for identified infection/condition  Outcome: Progressing  Goal: Absence of fever/infection during neutropenic period  Description: INTERVENTIONS:  - Monitor WBC    Outcome: Progressing     Problem: SAFETY ADULT  Goal: Patient will remain free of falls  Description: INTERVENTIONS:  - Educate patient/family on patient safety including physical limitations  - Instruct patient to call for assistance with activity   - Consult OT/PT to assist with strengthening/mobility   - Keep Call bell within reach  - Keep bed low and locked with side rails adjusted as appropriate  - Keep care items and personal belongings within reach  - Initiate and maintain comfort rounds  - Make Fall Risk Sign visible to staff  - Offer Toileting every  Hours,  in advance of need  - Initiate/Maintain alarm  - Obtain necessary fall risk management equipment:   - Apply yellow socks and bracelet for high fall risk patients  - Consider moving patient to room near nurses station  Outcome: Progressing  Goal: Maintain or return to baseline ADL function  Description: INTERVENTIONS:  -  Assess patient's ability to carry out ADLs; assess patient's baseline for ADL function and identify physical deficits which impact ability to perform ADLs (bathing, care of mouth/teeth, toileting, grooming, dressing, etc.)  - Assess/evaluate cause of self-care deficits   - Assess range of motion  - Assess patient's mobility; develop plan if impaired  - Assess patient's need for assistive devices and provide as appropriate  - Encourage maximum independence but intervene and supervise when necessary  - Involve family in performance of ADLs  - Assess for home care needs following discharge   - Consider OT consult to assist with ADL evaluation and planning for discharge  - Provide patient education as appropriate  Outcome: Progressing  Goal: Maintains/Returns to pre admission functional level  Description: INTERVENTIONS:  - Perform AM-PAC 6 Click Basic Mobility/ Daily Activity assessment daily.  - Set and communicate daily mobility goal to care team and patient/family/caregiver.   - Collaborate with rehabilitation services on mobility goals if consulted  - Perform Range of Motion  times a day.  - Reposition patient every  hours.  - Dangle patient  times a day  - Stand patient  times a day  - Ambulate patient  times a day  - Out of bed to chair  times a day   - Out of bed for meals  times a day  - Out of bed for toileting  - Record patient progress and toleration of activity level   Outcome: Progressing     Problem: Knowledge Deficit  Goal: Patient/family/caregiver demonstrates understanding of disease process, treatment plan, medications, and discharge instructions  Description: Complete learning  assessment and assess knowledge base.  Interventions:  - Provide teaching at level of understanding  - Provide teaching via preferred learning methods  Outcome: Progressing     Problem: DISCHARGE PLANNING  Goal: Discharge to home or other facility with appropriate resources  Description: INTERVENTIONS:  - Identify barriers to discharge w/patient and caregiver  - Arrange for needed discharge resources and transportation as appropriate  - Identify discharge learning needs (meds, wound care, etc.)  - Arrange for interpretive services to assist at discharge as needed  - Refer to Case Management Department for coordinating discharge planning if the patient needs post-hospital services based on physician/advanced practitioner order or complex needs related to functional status, cognitive ability, or social support system  Outcome: Progressing     Problem: POSTPARTUM  Goal: Experiences normal postpartum course  Description: INTERVENTIONS:  - Monitor maternal vital signs  - Assess uterine involution and lochia  Outcome: Progressing  Goal: Appropriate maternal -  bonding  Description: INTERVENTIONS:  - Identify family support  - Assess for appropriate maternal/infant bonding   -Encourage maternal/infant bonding opportunities  - Referral to  or  as needed  Outcome: Progressing  Goal: Establishment of infant feeding pattern  Description: INTERVENTIONS:  - Assess breast/bottle feeding  - Refer to lactation as needed  Outcome: Progressing  Goal: Incision(s), wounds(s) or drain site(s) healing without S/S of infection  Description: INTERVENTIONS  - Assess and document dressing, incision, wound bed, drain sites and surrounding tissue  - Provide patient and family education  - Perform skin care/dressing changes every   Outcome: Progressing

## 2024-04-25 NOTE — NURSING NOTE
Pt rang for RN stating that she was having chills during transfusion. Pt was visibly shaking. Vital signs taken. Pt was anxious and upset. Transfusion stopped and Dr Thomas and Lab was notified. Blood and tubing sent to lab. Pt requested IV taken out and changed. Labs were drawn off new IV and sent. Repeat vital signs taken.

## 2024-04-25 NOTE — CASE MANAGEMENT
Case Management Progress Note    Patient name Sharonda Kaplan  Location /-01 MRN [id number]  : 2005 Date 2024       LOS (days): 3  Geometric Mean LOS (GMLOS) (days):   Days to GMLOS:        OBJECTIVE:        Current admission status: Inpatient  Preferred Pharmacy:   [id number] [place], [place] - [address] [address]  Phone: [phone] Fax: [phone]    [id number] [place], [place] - [address] [facility] [place]  [address] [facility] [address]  Phone: [phone] Fax: [phone]    [facility] [place], [place] - [address]  [facility],  [address]  [facility],  First [address] [facility] [place] [address]  Phone: [phone] Fax: [phone]    Primary Care Provider: JOY Eaton    Primary Insurance: [employer]  Secondary Insurance:     PROGRESS NOTE:    Consult(s): (+) opiate screen s/p morphine w/ hx of UDS that was positive in  for amph/THC    CM met w/MOB who provided the following information:      Baby's name/gender: SOURAV Kaplan  Mother of baby: Sharonda Kaplan   Father of baby//SO: [place]  Other children: N/A  Lives with: FOB, FOB's parents and FOB's brother  Baby Supplies:  Confirmed having all necessary supplies  Bottle or Breast Feeding: Breast feeding  Breast Pump if breast feeding: Zomee delivered to bedside yesterday  Government Assistance Programs/WIC/EBT/SSI:  MOB confirmed she has WIC  Work/School: MOB not currently employed, FOB recently lost job but intends to look for employment   Transportation: FOB drives  Prenatal care:   [facility]  Pediatrician:  [facility]  Mental Health Hx or Treatment: MOB has hx of depression/anxiety w/ self harm/SI in 2018. MOB denies any recent concerns, reports MH is managed without medication or therapy. FOB is a support and MOB tasha through crocheting.   Substance Abuse: MOB has  hx THC use. CM reviewed UDS results from 2022 from ED visit in which UDS was (+) for THC and amphetamines. KAYLA denied any previous substance use other than THC and is wondering if her THC was laced then. She has not used THC since beginning of pregnancy. No UDS collected on MOB this admission. UDS for baby (+) for opiates; however, KAYLA was given morphine during delivery. CM to follow up on cord results. Explained to MOB that if results are positive for anything other than morphine, CM will call [facility]. If results are positive for morphine only, no further action needed. Informed neonatologist.    Community Referrals/C&Y/NFP: KAYLA has hx of CYS involvement when she was a child, she is adopted. No active CYS involvement.   Insurance for baby: [employer]     KAYLA denies any other CM needs at this time. Encouraged family to contact CM as needed.

## 2024-04-25 NOTE — PLAN OF CARE
Problem: Knowledge Deficit  Goal: Patient/family/caregiver demonstrates understanding of disease process, treatment plan, medications, and discharge instructions  Description: Complete learning assessment and assess knowledge base.  Interventions:  - Provide teaching at level of understanding  - Provide teaching via preferred learning methods  Outcome: Progressing     Problem: POSTPARTUM  Goal: Experiences normal postpartum course  Description: INTERVENTIONS:  - Monitor maternal vital signs  - Assess uterine involution and lochia  Outcome: Progressing  Goal: Appropriate maternal -  bonding  Description: INTERVENTIONS:  - Identify family support  - Assess for appropriate maternal/infant bonding   -Encourage maternal/infant bonding opportunities  - Referral to  or  as needed  Outcome: Progressing  Goal: Establishment of infant feeding pattern  Description: INTERVENTIONS:  - Assess breast/bottle feeding  - Refer to lactation as needed  Outcome: Progressing  Goal: Incision(s), wounds(s) or drain site(s) healing without S/S of infection  Description: INTERVENTIONS  - Assess and document dressing, incision, wound bed, drain sites and surrounding tissue  - Provide patient and family education  Outcome: Progressing

## 2024-04-26 VITALS
BODY MASS INDEX: 29.23 KG/M2 | TEMPERATURE: 98.2 F | OXYGEN SATURATION: 98 % | HEART RATE: 76 BPM | WEIGHT: 145 LBS | DIASTOLIC BLOOD PRESSURE: 91 MMHG | HEIGHT: 59 IN | SYSTOLIC BLOOD PRESSURE: 137 MMHG | RESPIRATION RATE: 18 BRPM

## 2024-04-26 LAB
ABO GROUP BLD BPU: NORMAL
BPU ID: NORMAL
CROSSMATCH: NORMAL
ERYTHROCYTE [DISTWIDTH] IN BLOOD BY AUTOMATED COUNT: 14.4 % (ref 11.6–15.1)
HAPTOGLOB SERPL-MCNC: 118 MG/DL (ref 33–278)
HCT VFR BLD AUTO: 27.2 % (ref 34.8–46.1)
HGB BLD-MCNC: 9.5 G/DL (ref 11.5–15.4)
MCH RBC QN AUTO: 32 PG (ref 26.8–34.3)
MCHC RBC AUTO-ENTMCNC: 34.9 G/DL (ref 31.4–37.4)
MCV RBC AUTO: 92 FL (ref 82–98)
PLATELET # BLD AUTO: 209 THOUSANDS/UL (ref 149–390)
PMV BLD AUTO: 10.1 FL (ref 8.9–12.7)
RBC # BLD AUTO: 2.97 MILLION/UL (ref 3.81–5.12)
UNIT DISPENSE STATUS: NORMAL
UNIT PRODUCT CODE: NORMAL
UNIT PRODUCT VOLUME: 350 ML
UNIT RH: NORMAL
WBC # BLD AUTO: 11.09 THOUSAND/UL (ref 4.31–10.16)

## 2024-04-26 PROCEDURE — 99024 POSTOP FOLLOW-UP VISIT: CPT | Performed by: OBSTETRICS & GYNECOLOGY

## 2024-04-26 PROCEDURE — NC001 PR NO CHARGE: Performed by: OBSTETRICS & GYNECOLOGY

## 2024-04-26 PROCEDURE — 90746 HEPB VACCINE 3 DOSE ADULT IM: CPT | Performed by: NURSE PRACTITIONER

## 2024-04-26 RX ORDER — DOCUSATE SODIUM 100 MG/1
100 CAPSULE, LIQUID FILLED ORAL 2 TIMES DAILY
Qty: 60 CAPSULE | Refills: 0 | Status: SHIPPED | OUTPATIENT
Start: 2024-04-26

## 2024-04-26 RX ORDER — ACETAMINOPHEN AND CODEINE PHOSPHATE 120; 12 MG/5ML; MG/5ML
1 SOLUTION ORAL DAILY
Qty: 84 TABLET | Refills: 0 | Status: SHIPPED | OUTPATIENT
Start: 2024-04-26 | End: 2024-06-25

## 2024-04-26 RX ADMIN — ACETAMINOPHEN 650 MG: 325 TABLET, FILM COATED ORAL at 07:37

## 2024-04-26 RX ADMIN — HEPATITIS B VACCINE (RECOMBINANT) 20 MCG: 20 INJECTION, SUSPENSION INTRAMUSCULAR at 14:40

## 2024-04-26 RX ADMIN — IBUPROFEN 600 MG: 600 TABLET ORAL at 11:54

## 2024-04-26 RX ADMIN — IBUPROFEN 600 MG: 600 TABLET ORAL at 00:04

## 2024-04-26 RX ADMIN — IBUPROFEN 600 MG: 600 TABLET ORAL at 05:51

## 2024-04-26 RX ADMIN — DOCUSATE SODIUM 100 MG: 100 CAPSULE, LIQUID FILLED ORAL at 09:32

## 2024-04-26 NOTE — LACTATION NOTE
This note was copied from a baby's chart.  CONSULT - LACTATION  Baby Boy (Whitley Kaplan 3 days male MRN: [id number]    [facility] NURSERY Room / Bed: (N)/(N) Encounter: [id number]    Maternal Information     MOTHER:  Sharonda Kaplan  Maternal Age: 18 y.o.   OB History: # 1 - Date: 24, Sex: Male, Weight: 3190 g (7 lb 0.5 oz), GA: 39w2d, Delivery: Vaginal, Spontaneous, Apgar1: 8, Apgar5: 9, Living: Living, Birth Comments: None   Previouse breast reduction surgery? No    Lactation history:   Has patient previously breast fed: No   How long had patient previously breast fed:     Previous breast feeding complications:     No past surgical history on file.     Birth information:  YOB: 2026   Time of birth: 10:37 PM   Sex: male   Delivery type: Vaginal, Spontaneous   Birth Weight: 3190 g (7 lb 0.5 oz)   Percent of Weight Change: -2%     Gestational Age: 39w2d   [unfilled]    Assessment     Breast and nipple assessment:  breasts tense and reluctant with hand expression. Marment technique demonstrated with proper return demonstration. Some effectiveness noted with a few drops, parents became optimistic as this was the most they say they have seen.    [place] Assessment: normal assessment    Feeding assessment: feeding well     24 1310   Lactation Consultation   Reason for Consult 20 mins;20 min   Lactation Consultant Total Time 40   LATCH Documentation   Latch 2   Audible Swallowing 2   Type of Nipple 2   Comfort (Breast/Nipple) 2   Hold (Positioning) 2   LATCH Score 10   Having latch problems? No   Position(s) Used Cradle;Cross Cradle   Breasts/Nipples   Left Breast Soft   Right Breast Soft   Left Nipple Everted   Right Nipple Everted   Breastfeeding Progress Not yet established;Breastfeeding well   Other OB Lactation Tools   Feeding Devices Supplemental Nursing System (SNS)   Patient Follow-Up   Lactation Consult Status 2   Follow-Up  Type Inpatient;Call as needed   Other OB Lactation Documentation    Additional Problem Noted Called back to patient room for baby not settling in to feed as he had done earlier. Offered and demonstrated SNS with 30 ml. Dad did teachback of assembly of SNS. Both seemed eager to have this as an option following large volumes of formula baby consumed for glucometer protocol. Offered option of DBM for home use instead of formula per verbalized desires. Parents were to have a discussion and call back if this was desired. Feeding plan given incorporating SNS at the breast and pumping. Offered ways for dad to stay involved by supporting mom with devices. Baby fed to satiety after SNS was emptied and removed from feeding. Warm referral provided to [facility] at patient's request.       04/26/24 1130   Lactation Consultation   Reason for Consult 20 min;10 m   LATCH Documentation   Latch 2   Audible Swallowing 2   Type of Nipple 2   Comfort (Breast/Nipple) 2   Hold (Positioning) 1   LATCH Score 9   Having latch problems? No   Position(s) Used Laid Back   Breasts/Nipples   Left Breast Soft   Right Breast Soft   Left Nipple Everted   Right Nipple Everted   Intervention Hand expression   Breastfeeding Progress Not yet established   Patient Follow-Up   Lactation Consult Status 2   Follow-Up Type Inpatient;Call as needed   Other OB Lactation Documentation    Additional Problem Noted Baby latched very well at this time. Parents had used formula while baby on glucometer protocol (2 ounces per feeding at times) Discussed paced bottle feeding.       Feeding recommendations:  breast feed on demand    Feeding Plan:     1. Meet early feeding cues     2. Bring baby to breast skin to skin     3. Extend chin, anchoring it onto the breast to assist with deeper latch     4. Align nipple to nose, not sliding it to the lower lip, but instead, pivoting the compressed areola over the lower lip if using parent led latching so as to have the nipple come  to rest in the arch of the baby's mouth     5. May use breast compressions to stimulate suck and provide more breast milk for enticement.     6. Introduce breast first for feeding with supplemental nursing system. May use hand expression to entice baby to wake     7. Feed infant expressed breast milk and formula/donor breast milk per your preference with supplemental nursing system at the breast.    8.  Pump after as many feedings at the breast as reasonable     9. Follow up with outpatient lactation as soon as possible     Encouraged parents to call for assistance, questions, and concerns about breastfeeding.  Extension provided.        Kaylan Rosado RN 4/26/2024 2:34 PM

## 2024-04-26 NOTE — PLAN OF CARE
Problem: PAIN - ADULT  Goal: Verbalizes/displays adequate comfort level or baseline comfort level  Description: Interventions:  - Encourage patient to monitor pain and request assistance  - Assess pain using appropriate pain scale  - Administer analgesics based on type and severity of pain and evaluate response  - Implement non-pharmacological measures as appropriate and evaluate response  - Consider cultural and social influences on pain and pain management  - Notify physician/advanced practitioner if interventions unsuccessful or patient reports new pain  Outcome: Progressing     Problem: INFECTION - ADULT  Goal: Absence or prevention of progression during hospitalization  Description: INTERVENTIONS:  - Assess and monitor for signs and symptoms of infection  - Monitor lab/diagnostic results  - Monitor all insertion sites, i.e. indwelling lines, tubes, and drains  - Monitor endotracheal if appropriate and nasal secretions for changes in amount and color  - [place] appropriate cooling/warming therapies per order  - Administer medications as ordered  - Instruct and encourage patient and family to use good hand hygiene technique  - Identify and instruct in appropriate isolation precautions for identified infection/condition  Outcome: Progressing  Goal: Absence of fever/infection during neutropenic period  Description: INTERVENTIONS:  - Monitor WBC    Outcome: Progressing     Problem: SAFETY ADULT  Goal: Patient will remain free of falls  Description: INTERVENTIONS:  - Educate patient/family on patient safety including physical limitations  - Instruct patient to call for assistance with activity   - Consult OT/PT to assist with strengthening/mobility   - Keep Call bell within reach  - Keep bed low and locked with side rails adjusted as appropriate  - Keep care items and personal belongings within reach  - Initiate and maintain comfort rounds  - Make Fall Risk Sign visible to staff  - Apply yellow socks and bracelet  for high fall risk patients  - Consider moving patient to room near nurses station  Outcome: Progressing  Goal: Maintain or return to baseline ADL function  Description: INTERVENTIONS:  -  Assess patient's ability to carry out ADLs; assess patient's baseline for ADL function and identify physical deficits which impact ability to perform ADLs (bathing, care of mouth/teeth, toileting, grooming, dressing, etc.)  - Assess/evaluate cause of self-care deficits   - Assess range of motion  - Assess patient's mobility; develop plan if impaired  - Assess patient's need for assistive devices and provide as appropriate  - Encourage maximum independence but intervene and supervise when necessary  - Involve family in performance of ADLs  - Assess for home care needs following discharge   - Consider OT consult to assist with ADL evaluation and planning for discharge  - Provide patient education as appropriate  Outcome: Progressing  Goal: Maintains/Returns to pre admission functional level  Description: INTERVENTIONS:  - Perform AM-PAC 6 Click Basic Mobility/ Daily Activity assessment daily.  - Set and communicate daily mobility goal to care team and patient/family/caregiver.   - Collaborate with rehabilitation services on mobility goals if consulted  - Out of bed for toileting  - Record patient progress and toleration of activity level   Outcome: Progressing     Problem: Knowledge Deficit  Goal: Patient/family/caregiver demonstrates understanding of disease process, treatment plan, medications, and discharge instructions  Description: Complete learning assessment and assess knowledge base.  Interventions:  - Provide teaching at level of understanding  - Provide teaching via preferred learning methods  Outcome: Progressing     Problem: DISCHARGE PLANNING  Goal: Discharge to home or other facility with appropriate resources  Description: INTERVENTIONS:  - Identify barriers to discharge w/patient and caregiver  - Arrange for needed  discharge resources and transportation as appropriate  - Identify discharge learning needs (meds, wound care, etc.)  - Arrange for interpretive services to assist at discharge as needed  - Refer to Case Management Department for coordinating discharge planning if the patient needs post-hospital services based on physician/advanced practitioner order or complex needs related to functional status, cognitive ability, or social support system  Outcome: Progressing     Problem: POSTPARTUM  Goal: Experiences normal postpartum course  Description: INTERVENTIONS:  - Monitor maternal vital signs  - Assess uterine involution and lochia  Outcome: Progressing  Goal: Appropriate maternal -  bonding  Description: INTERVENTIONS:  - Identify family support  - Assess for appropriate maternal/infant bonding   -Encourage maternal/infant bonding opportunities  - Referral to  or  as needed  Outcome: Progressing  Goal: Establishment of infant feeding pattern  Description: INTERVENTIONS:  - Assess breast/bottle feeding  - Refer to lactation as needed  Outcome: Progressing  Goal: Incision(s), wounds(s) or drain site(s) healing without S/S of infection  Description: INTERVENTIONS  - Assess and document dressing, incision, wound bed, drain sites and surrounding tissue  - Provide patient and family education  Outcome: Progressing

## 2024-04-26 NOTE — PLAN OF CARE
Problem: PAIN - ADULT  Goal: Verbalizes/displays adequate comfort level or baseline comfort level  Description: Interventions:  - Encourage patient to monitor pain and request assistance  - Assess pain using appropriate pain scale  - Administer analgesics based on type and severity of pain and evaluate response  - Implement non-pharmacological measures as appropriate and evaluate response  - Consider cultural and social influences on pain and pain management  - Notify physician/advanced practitioner if interventions unsuccessful or patient reports new pain  Outcome: Progressing     Problem: INFECTION - ADULT  Goal: Absence or prevention of progression during hospitalization  Description: INTERVENTIONS:  - Assess and monitor for signs and symptoms of infection  - Monitor lab/diagnostic results  - Monitor all insertion sites, i.e. indwelling lines, tubes, and drains  - Monitor endotracheal if appropriate and nasal secretions for changes in amount and color  - [place] appropriate cooling/warming therapies per order  - Administer medications as ordered  - Instruct and encourage patient and family to use good hand hygiene technique  - Identify and instruct in appropriate isolation precautions for identified infection/condition  Outcome: Progressing  Goal: Absence of fever/infection during neutropenic period  Description: INTERVENTIONS:  - Monitor WBC    Outcome: Progressing     Problem: SAFETY ADULT  Goal: Patient will remain free of falls  Description: INTERVENTIONS:  - Educate patient/family on patient safety including physical limitations  - Instruct patient to call for assistance with activity   - Consult OT/PT to assist with strengthening/mobility   - Keep Call bell within reach  - Keep bed low and locked with side rails adjusted as appropriate  - Keep care items and personal belongings within reach  - Initiate and maintain comfort rounds  - Make Fall Risk Sign visible to staff  - Apply yellow socks and bracelet  for high fall risk patients  - Consider moving patient to room near nurses station  Outcome: Progressing  Goal: Maintain or return to baseline ADL function  Description: INTERVENTIONS:  -  Assess patient's ability to carry out ADLs; assess patient's baseline for ADL function and identify physical deficits which impact ability to perform ADLs (bathing, care of mouth/teeth, toileting, grooming, dressing, etc.)  - Assess/evaluate cause of self-care deficits   - Assess range of motion  - Assess patient's mobility; develop plan if impaired  - Assess patient's need for assistive devices and provide as appropriate  - Encourage maximum independence but intervene and supervise when necessary  - Involve family in performance of ADLs  - Assess for home care needs following discharge   - Consider OT consult to assist with ADL evaluation and planning for discharge  - Provide patient education as appropriate  Outcome: Progressing  Goal: Maintains/Returns to pre admission functional level  Description: INTERVENTIONS:  - Perform AM-PAC 6 Click Basic Mobility/ Daily Activity assessment daily.  - Set and communicate daily mobility goal to care team and patient/family/caregiver.   - Collaborate with rehabilitation services on mobility goals if consulted  Problem: DISCHARGE PLANNING  Goal: Discharge to home or other facility with appropriate resources  Description: INTERVENTIONS:  - Identify barriers to discharge w/patient and caregiver  - Arrange for needed discharge resources and transportation as appropriate  - Identify discharge learning needs (meds, wound care, etc.)  - Arrange for interpretive services to assist at discharge as needed  - Refer to Case Management Department for coordinating discharge planning if the patient needs post-hospital services based on physician/advanced practitioner order or complex needs related to functional status, cognitive ability, or social support system  Outcome: Progressing     Problem:  POSTPARTUM  Goal: Experiences normal postpartum course  Description: INTERVENTIONS:  - Monitor maternal vital signs  - Assess uterine involution and lochia  Outcome: Progressing  Goal: Appropriate maternal -  bonding  Description: INTERVENTIONS:  - Identify family support  - Assess for appropriate maternal/infant bonding   -Encourage maternal/infant bonding opportunities  - Referral to  or  as needed  Outcome: Progressing  Goal: Establishment of infant feeding pattern  Description: INTERVENTIONS:  - Assess breast/bottle feeding  - Refer to lactation as needed  Outcome: Progressing  Goal: Incision(s), wounds(s) or drain site(s) healing without S/S of infection  Description: INTERVENTIONS  - Assess and document dressing, incision, wound bed, drain sites and surrounding tissue  - Provide patient and family education  Outcome: Progressing     - Out of bed for toileting  - Record patient progress and toleration of activity level   Outcome: Progressing

## 2024-04-29 NOTE — UTILIZATION REVIEW
"Mother and baby discharged on 2024      NOTIFICATION OF INPATIENT ADMISSION   MATERNITY/DELIVERY AUTHORIZATION REQUEST   SERVICING FACILITY:   [facility]  Parent Child Health - L&D, [place], NICU  [address] [place] [address]  Tax ID: [id number]  NPI: [id number] ATTENDING PROVIDER:  Attending Name and NPI#: Clementina Matthew Md [[id number]]  Address: [address] [place] [address]  Phone: [phone]     ADMISSION INFORMATION:  Place of Service: Inpatient [facility]  Place of Service Code: 21  Inpatient Admission Date/Time: 24  9:51 PM  Discharge Date/Time: 2024  4:56 PM  Admitting Diagnosis Code/Description:  Encounter for full-term uncomplicated delivery [O80]   Mother: Sharonda Kaplan 2005 Estimated Date of Delivery: 24  Delivering clinician: Clementina Matthew    OB History          1    Para   1    Term   1       0    AB   0    Living   1         SAB   0    IAB   0    Ectopic   0    Multiple   0    Live Births   1               [place] Name & MRN:   Information for the patient's :  Den[place] Mcmullen Jr. [[id number]]   [place] Delivery Information:  Sex: male  Delivered 2024 10:37 PM by Vaginal, Spontaneous; Gestational Age: 39w2d     Measurements:  Weight: 7 lb 0.5 oz (3190 g);  Height: 18.5\"    APGAR 1 minute 5 minutes 10 minutes   Totals: 8 9       UTILIZATION REVIEW CONTACT:  Cara Crawley, Utilization   Network Utilization Review Department  Phone: [phone]  Fax [phone]  Email: [email or web address]  Contact for approvals/pending authorizations, clinical reviews, and discharge.   PHYSICIAN ADVISORY SERVICES:  Medical Necessity Denial & Ztdk-oc-Gmlp Review  Phone: [phone]  Fax: [phone]  Email: [email or web address]   DISCHARGE SUPPORT TEAM:  For Patients Discharge Needs & Updates  Phone: [phone] opt. 2 Fax: [phone]  Email: " [email or web address]

## 2024-04-30 ENCOUNTER — TELEPHONE (OUTPATIENT)
Dept: OBGYN CLINIC | Facility: CLINIC | Age: 19
End: 2024-04-30

## 2024-04-30 NOTE — TELEPHONE ENCOUNTER
"POSTPARTUM PHONE CALL ASSESSMENT    Date of Delivery: 2024  Delivering Provider: Dr. Matthew  Mode:   Delivery Notes/Complications: postpartum hemorrhage    Do you still have bleeding? yes  If so, how much/how severe? moderate bleeding  Do you have any pain? yes  If so, how much/how severe?  Complaining of cramping and back pain.  Regular BMs/Urination? Yes-using Colace twice daily  Breastfeeding/Formula/Both? Breastfeeding-pumping  How are you doing emotionally?  States \"it is hard, but I am doing ok\".    EPDS Score: sent link via [employer]  Do you have any other questions or concerns for us or your provider? no   Have you scheduled the pediatrician appointment with pediatrician? yes  Do you have a postpartum visit scheduled? yes   Date scheduled: 2024 Provider:  Dr. Wilson  "

## 2024-05-04 LAB — PLACENTA IN STORAGE: NORMAL

## 2024-05-09 LAB — SCAN RESULT: NORMAL

## 2024-05-15 ENCOUNTER — SOCIAL WORK (OUTPATIENT)
Age: 19
End: 2024-05-15

## 2024-05-15 DIAGNOSIS — F33.1 MDD (MAJOR DEPRESSIVE DISORDER), RECURRENT EPISODE, MODERATE (HCC): Primary | ICD-10-CM

## 2024-05-15 DIAGNOSIS — F41.1 GAD (GENERALIZED ANXIETY DISORDER): ICD-10-CM
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
## 2024-05-17 ENCOUNTER — POSTPARTUM VISIT (OUTPATIENT)
Dept: OBGYN CLINIC | Facility: CLINIC | Age: 19
End: 2024-05-17
Payer: COMMERCIAL

## 2024-05-17 VITALS
DIASTOLIC BLOOD PRESSURE: 66 MMHG | BODY MASS INDEX: 27.01 KG/M2 | WEIGHT: 134 LBS | HEIGHT: 59 IN | SYSTOLIC BLOOD PRESSURE: 104 MMHG

## 2024-05-17 DIAGNOSIS — Z30.41 ORAL CONTRACEPTIVE PILL SURVEILLANCE: ICD-10-CM

## 2024-05-17 RX ORDER — ACETAMINOPHEN AND CODEINE PHOSPHATE 120; 12 MG/5ML; MG/5ML
1 SOLUTION ORAL DAILY
Qty: 84 TABLET | Refills: 0 | Status: SHIPPED | OUTPATIENT
Start: 2024-05-17

## 2024-05-17 NOTE — PROGRESS NOTES
Patient is a 18 y.o.  with Patient's last menstrual period was 2023. who presents for post partum examination s/p  on 2024. Pregnancy was complicated by GDMA1. Delivery was complicated by post partum hemorrhage due to lacerations.     Pt is without complaints. She reports overall she is feeling well.    She reports her lochia has not stopped.    Pt reports she is Breast and bottle feeding.    Pt reports si/sx of ppd. She reports she is seeing someone [facility] two days ago. She scored a 21 on her ppd-E today. She is not interested in medications at this time. Will call in 1-2 weeks and repeat ppd score and see if improving.     Pt reports she has not been sexually active as of yet.    She is using POPs for contraception.     Past Medical History:   Diagnosis Date    Abnormal body odor     Anemia     Constipation     Depression     Dysmenorrhea     Herpes     Hirsutism     Migraine     Ovarian cyst     Sleep apnea of         No past surgical history on file.    OB History    Para Term  AB Living   1 1 1 0 0 1   SAB IAB Ectopic Multiple Live Births   0 0 0 0 1      # Outcome Date GA Lbr Alec/2nd Weight Sex Type Anes PTL Lv   1 Term 24 39w2d / 00:57 3190 g (7 lb 0.5 oz) M Vag-Spont EPI N ZORAN           Current Outpatient Medications:     acetaminophen (TYLENOL) 325 mg tablet, Take 2 tablets (650 mg total) by mouth every 6 (six) hours as needed for mild pain, headaches or fever, Disp: , Rfl:     benzocaine-menthol-lanolin-aloe (DERMOPLAST) 20-0.5 % topical spray, Apply 1 Application topically every 6 (six) hours as needed for mild pain or irritation, Disp: , Rfl:     docusate sodium (COLACE) 100 mg capsule, Take 1 capsule (100 mg total) by mouth 2 (two) times a day, Disp: 60 capsule, Rfl: 0    ibuprofen (MOTRIN) 600 mg tablet, Take 1 tablet (600 mg total) by mouth every 6 (six) hours, Disp: 30 tablet, Rfl: 0    norethindrone (Ortho Micronor) 0.35 MG  tablet, Take 1 tablet (0.35 mg total) by mouth daily, Disp: 84 tablet, Rfl: 0    Prenatal MV-Min-Fe Fum-FA-DHA (PRENATAL 1 PO), Take by mouth, Disp: , Rfl:     valACYclovir (VALTREX) 500 mg tablet, Take 2 tablets (1,000 mg total) by mouth daily for 28 days, Disp: 56 tablet, Rfl: 0    witch hazel-glycerin (TUCKS) topical pad, Apply 1 Pad topically every 4 (four) hours as needed for irritation, Disp: , Rfl:     Iron, Ferrous Sulfate, 325 (65 Fe) MG TABS, Take by mouth [place] (Patient not taking: Reported on 5/17/2024), Disp: , Rfl:     No Known Allergies    Social History     Socioeconomic History    Marital status: Single     Spouse name: Not on file    Number of children: 0    Years of education: Not on file    Highest education level: High school graduate   Occupational History    Occupation: Unemployed   Tobacco Use    Smoking status: Never    Smokeless tobacco: Never   Vaping Use    Vaping status: Never Used   Substance and Sexual Activity    Alcohol use: Not Currently    Drug use: Not Currently     Types: Marijuana    Sexual activity: Yes     Partners: Male     Birth control/protection: None   Other Topics Concern    Not on file   Social History Narrative    Occasional caffeine consumption     Social Determinants of Health     Financial Resource Strain: Not on file   Food Insecurity: No Food Insecurity (4/24/2024)    Hunger Vital Sign     Worried About Running Out of Food in the Last Year: Never true     Ran Out of Food in the Last Year: Never true   Transportation Needs: No Transportation Needs (4/24/2024)    PRAPARE - Transportation     Lack of Transportation (Medical): No     Lack of Transportation (Non-Medical): No   Physical Activity: Not on file   Stress: Not on file   Social Connections: Not on file   Intimate Partner Violence: Not on file   Housing Stability: Low Risk  (4/24/2024)    Housing Stability Vital Sign     Unable to Pay for Housing in the Last Year: No     Number of Places Lived in the  "Last Year: 1     Unstable Housing in the Last Year: No       Family History   Problem Relation Age of Onset    Drug abuse Mother     Alcohol abuse Mother     Drug abuse Father     Alcohol abuse Father     No Known Problems Maternal Grandmother     Diabetes Maternal Grandfather     No Known Problems Paternal Grandmother     Diabetes Paternal Grandfather     Schizoaffective Disorder  Maternal Uncle     Diabetes Maternal Uncle     Tourette syndrome Half-Sister     No Known Problems Half-Sister     No Known Problems Half-Sister     No Known Problems Half-Brother     No Known Problems Half-Brother     No Known Problems Half-Brother        Review of Systems   Constitutional:  Negative for chills, fatigue, fever and unexpected weight change.   HENT:  Negative for congestion, mouth sores and sore throat.    Respiratory:  Negative for cough, chest tightness, shortness of breath and wheezing.    Cardiovascular:  Negative for chest pain and palpitations.   Gastrointestinal:  Negative for abdominal distention, abdominal pain, constipation, diarrhea, nausea and vomiting.   Endocrine: Negative for cold intolerance and heat intolerance.   Genitourinary:  Positive for vaginal bleeding and vaginal pain. Negative for dyspareunia (not active since delivery), dysuria, genital sores, menstrual problem, pelvic pain and vaginal discharge.   Musculoskeletal:  Negative for arthralgias.   Skin:  Negative for color change and rash.   Neurological:  Negative for dizziness, light-headedness and headaches.   Hematological:  Negative for adenopathy.       Blood pressure 104/66, height 4' 11\" (1.499 m), weight 60.8 kg (134 lb), last menstrual period 07/04/2023, currently breastfeeding. and Body mass index is 27.06 kg/m².    Physical Exam  Constitutional:       General: She is not in acute distress.     Appearance: Normal appearance. She is well-developed and normal weight. She is not ill-appearing.   HENT:      Head: Normocephalic and atraumatic. "   Eyes:      Extraocular Movements: Extraocular movements intact.      Conjunctiva/sclera: Conjunctivae normal.   Neck:      Thyroid: No thyromegaly.      Trachea: No tracheal deviation.   Cardiovascular:      Rate and Rhythm: Normal rate and regular rhythm.      Heart sounds: Normal heart sounds.   Pulmonary:      Effort: Pulmonary effort is normal. No respiratory distress.      Breath sounds: Normal breath sounds. No stridor. No wheezing or rales.   Abdominal:      General: Bowel sounds are normal. There is no distension.      Palpations: Abdomen is soft. There is no mass.      Tenderness: There is no abdominal tenderness. There is no guarding or rebound.      Hernia: No hernia is present.   Musculoskeletal:         General: No tenderness. Normal range of motion.      Cervical back: Normal range of motion and neck supple.   Lymphadenopathy:      Cervical: No cervical adenopathy.   Skin:     General: Skin is warm.      Findings: No erythema or rash.   Neurological:      Mental Status: She is alert and oriented to person, place, and time.   Psychiatric:         Mood and Affect: Mood normal.         Behavior: Behavior normal.         Thought Content: Thought content normal.         Judgment: Judgment normal.       Breasts: breasts appear normal, no suspicious masses, no skin or nipple changes or axillary nodes, lactating, no erythema or tenderness, nipples normal.     vulva: normal external genitalia for age and no lesions, masses, epithelial changes, or exudate  vagina: color pink, rugae  flattening of rugae, bleeding  with a small amount of bleeding, and lacerations sites healing well, sutures intact  cervix: parous and no lesions   uterus: NSSC, AF, NT, mobile and 10 wks  adnexa: no masses or tenderness      A/P:  Pt is a 18 y.o.  with      Sharonda was seen today for postpartum care.    Diagnoses and all orders for this visit:    Postpartum care and examination of lactating mother  -healing well  -at high  risk for ppd with score today--will continue meeting with her therapist  -nurse navigator to call in 10-14 days for another PPD score    Oral contraceptive pill surveillance  -     norethindrone (Ortho Micronor) 0.35 MG tablet; Take 1 tablet (0.35 mg total) by mouth daily

## 2024-05-17 NOTE — Clinical Note
Please call patient in 10-14 days to repeat PPD score. Elevated today at 21, but just started meeting with nurse  marquez. Want to reassess PPD score. If still elevated will need appt to discuss medications for PPD

## 2024-05-22 ENCOUNTER — SOCIAL WORK (OUTPATIENT)
Age: 19
End: 2024-05-22

## 2024-05-22 DIAGNOSIS — F33.1 MDD (MAJOR DEPRESSIVE DISORDER), RECURRENT EPISODE, MODERATE (HCC): ICD-10-CM

## 2024-05-22 DIAGNOSIS — F41.1 GAD (GENERALIZED ANXIETY DISORDER): Primary | ICD-10-CM

## 2024-05-22 PROCEDURE — MBD PR MOVING BEYOND DEPRESSION: Performed by: SOCIAL WORKER

## 2024-05-22 NOTE — PSYCH
"Behavioral Health Psychotherapy Progress Note    Psychotherapy Provided: Individual Psychotherapy     No diagnosis found.    Goals addressed in session: Goal 1, Goal 2, and Goal 3      DATA: This clinician met with Sharonda Kaplan for IH-CBT.  Session #1.  Completed the EPDS and she scored 16.    Homework Review:No homework completed    [place] Items: Goals, baby, Feelings    Sessions Content:Becka has been less depressed during the past week but more anxious.  She denies SI/HI.  She is enjoying her baby.  She feels sleep deprived but she is enjoying caring for the baby.  She is thinking about what her goals for therapy will be and we will discuss during our next session.  She states that she has a hard time with feeling identification.  Assigned the My Feelings worksheet.    Homework Assigned:Goals sheet and My Feelings worksheet    Feedback from Mother:She has been feeling more anxious than depressed.    Plan:F/U 6/13 @ 1030  During this session, this clinician used the following therapeutic modalities: Cognitive Behavioral Therapy    Substance Abuse was not addressed during this session. If the client is diagnosed with a co-occurring substance use disorder, please indicate any changes in the frequency or amount of use: N/A. Stage of change for addressing substance use diagnoses: No substance use/Not applicable    ASSESSMENT:  Sharonda Kaplan presents with a Euthymic/ normal and Anxious mood.     her affect is Normal range and intensity and Flat, which is congruent, with her mood and the content of the session. The client has not made progress on their goals.    Currently, Sharonda Kaplan presents with a none risk of suicide, none risk of self-harm, and none risk of harm to others.    For any risk assessment that surpasses a \"low\" rating, a safety plan must be developed.    A safety plan was indicated: no  If yes, describe in detail N/A    PLAN: Between sessions, Sharonda Kaplan will work on " implementing grounding techniques. At the next session, the therapist will use Cognitive Behavioral Therapy to address symptoms of depression and anxiety.    Behavioral Health Treatment Plan and Discharge Planning: Sharonda Kaplan is aware of and agrees to continue to work on their treatment plan. They have identified and are working toward their discharge goals. yes    Visit start and stop times:  Start: 1000   Stop: 1100      05/22/24

## 2024-05-29 ENCOUNTER — HOSPITAL ENCOUNTER (EMERGENCY)
Facility: HOSPITAL | Age: 19
Discharge: HOME | End: 2024-05-29
Attending: EMERGENCY MEDICINE
Payer: COMMERCIAL

## 2024-05-29 ENCOUNTER — NURSE TRIAGE (OUTPATIENT)
Age: 19
End: 2024-05-29

## 2024-05-29 VITALS
DIASTOLIC BLOOD PRESSURE: 54 MMHG | RESPIRATION RATE: 17 BRPM | OXYGEN SATURATION: 97 % | TEMPERATURE: 97.8 F | SYSTOLIC BLOOD PRESSURE: 108 MMHG | HEART RATE: 89 BPM

## 2024-05-29 DIAGNOSIS — N39.0 UTI (URINARY TRACT INFECTION): ICD-10-CM

## 2024-05-29 DIAGNOSIS — R74.8 ELEVATED ALKALINE PHOSPHATASE LEVEL: ICD-10-CM

## 2024-05-29 DIAGNOSIS — N93.9 VAGINAL BLEEDING: Primary | ICD-10-CM

## 2024-05-29 LAB
ABO GROUP BLD: NORMAL
ALBUMIN SERPL BCP-MCNC: 4.2 G/DL (ref 3.5–5)
ALP SERPL-CCNC: 142 U/L (ref 34–104)
ALT SERPL W P-5'-P-CCNC: 24 U/L (ref 7–52)
ANION GAP SERPL CALCULATED.3IONS-SCNC: 5 MMOL/L (ref 4–13)
AST SERPL W P-5'-P-CCNC: 18 U/L (ref 13–39)
BACTERIA UR QL AUTO: ABNORMAL /HPF
BASOPHILS # BLD AUTO: 0.02 THOUSANDS/ÂΜL (ref 0–0.1)
BASOPHILS NFR BLD AUTO: 0 % (ref 0–1)
BILIRUB SERPL-MCNC: 0.38 MG/DL (ref 0.2–1)
BILIRUB UR QL STRIP: NEGATIVE
BLD GP AB SCN SERPL QL: NEGATIVE
BUN SERPL-MCNC: 15 MG/DL (ref 5–25)
CALCIUM SERPL-MCNC: 9.2 MG/DL (ref 8.4–10.2)
CHLORIDE SERPL-SCNC: 106 MMOL/L (ref 96–108)
CLARITY UR: ABNORMAL
CO2 SERPL-SCNC: 28 MMOL/L (ref 21–32)
COLOR UR: ABNORMAL
CREAT SERPL-MCNC: 0.58 MG/DL (ref 0.6–1.3)
EOSINOPHIL # BLD AUTO: 0.1 THOUSAND/ÂΜL (ref 0–0.61)
EOSINOPHIL NFR BLD AUTO: 2 % (ref 0–6)
ERYTHROCYTE [DISTWIDTH] IN BLOOD BY AUTOMATED COUNT: 12 % (ref 11.6–15.1)
EXT PREGNANCY TEST URINE: NEGATIVE
EXT. CONTROL: NORMAL
GFR SERPL CREATININE-BSD FRML MDRD: 133 ML/MIN/1.73SQ M
GLUCOSE SERPL-MCNC: 125 MG/DL (ref 65–140)
GLUCOSE UR STRIP-MCNC: NEGATIVE MG/DL
HCT VFR BLD AUTO: 34.8 % (ref 34.8–46.1)
HGB BLD-MCNC: 11.8 G/DL (ref 11.5–15.4)
HGB UR QL STRIP.AUTO: ABNORMAL
IMM GRANULOCYTES # BLD AUTO: 0.01 THOUSAND/UL (ref 0–0.2)
IMM GRANULOCYTES NFR BLD AUTO: 0 % (ref 0–2)
KETONES UR STRIP-MCNC: NEGATIVE MG/DL
LEUKOCYTE ESTERASE UR QL STRIP: ABNORMAL
LIPASE SERPL-CCNC: 14 U/L (ref 11–82)
LYMPHOCYTES # BLD AUTO: 1.58 THOUSANDS/ÂΜL (ref 0.6–4.47)
LYMPHOCYTES NFR BLD AUTO: 29 % (ref 14–44)
MCH RBC QN AUTO: 30.9 PG (ref 26.8–34.3)
MCHC RBC AUTO-ENTMCNC: 33.9 G/DL (ref 31.4–37.4)
MCV RBC AUTO: 91 FL (ref 82–98)
MONOCYTES # BLD AUTO: 0.36 THOUSAND/ÂΜL (ref 0.17–1.22)
MONOCYTES NFR BLD AUTO: 7 % (ref 4–12)
NEUTROPHILS # BLD AUTO: 3.41 THOUSANDS/ÂΜL (ref 1.85–7.62)
NEUTS SEG NFR BLD AUTO: 62 % (ref 43–75)
NITRITE UR QL STRIP: NEGATIVE
NON-SQ EPI CELLS URNS QL MICRO: ABNORMAL /HPF
NRBC BLD AUTO-RTO: 0 /100 WBCS
PH UR STRIP.AUTO: 6 [PH]
PLATELET # BLD AUTO: 300 THOUSANDS/UL (ref 149–390)
PMV BLD AUTO: 9 FL (ref 8.9–12.7)
POTASSIUM SERPL-SCNC: 3.8 MMOL/L (ref 3.5–5.3)
PROT SERPL-MCNC: 7.3 G/DL (ref 6.4–8.4)
PROT UR STRIP-MCNC: ABNORMAL MG/DL
RBC # BLD AUTO: 3.82 MILLION/UL (ref 3.81–5.12)
RBC #/AREA URNS AUTO: ABNORMAL /HPF
RH BLD: POSITIVE
SODIUM SERPL-SCNC: 139 MMOL/L (ref 135–147)
SP GR UR STRIP.AUTO: 1.02 (ref 1–1.03)
SPECIMEN EXPIRATION DATE: NORMAL
UROBILINOGEN UR STRIP-ACNC: <2 MG/DL
WBC # BLD AUTO: 5.48 THOUSAND/UL (ref 4.31–10.16)
WBC #/AREA URNS AUTO: ABNORMAL /HPF

## 2024-05-29 PROCEDURE — 86850 RBC ANTIBODY SCREEN: CPT

## 2024-05-29 PROCEDURE — 85025 COMPLETE CBC W/AUTO DIFF WBC: CPT | Performed by: EMERGENCY MEDICINE

## 2024-05-29 PROCEDURE — 96374 THER/PROPH/DIAG INJ IV PUSH: CPT

## 2024-05-29 PROCEDURE — 81025 URINE PREGNANCY TEST: CPT

## 2024-05-29 PROCEDURE — 99285 EMERGENCY DEPT VISIT HI MDM: CPT | Performed by: EMERGENCY MEDICINE

## 2024-05-29 PROCEDURE — 86900 BLOOD TYPING SEROLOGIC ABO: CPT

## 2024-05-29 PROCEDURE — 81001 URINALYSIS AUTO W/SCOPE: CPT

## 2024-05-29 PROCEDURE — 36415 COLL VENOUS BLD VENIPUNCTURE: CPT

## 2024-05-29 PROCEDURE — 80053 COMPREHEN METABOLIC PANEL: CPT

## 2024-05-29 PROCEDURE — 83690 ASSAY OF LIPASE: CPT

## 2024-05-29 PROCEDURE — 99283 EMERGENCY DEPT VISIT LOW MDM: CPT

## 2024-05-29 PROCEDURE — 99243 OFF/OP CNSLTJ NEW/EST LOW 30: CPT | Performed by: OBSTETRICS & GYNECOLOGY

## 2024-05-29 PROCEDURE — 96361 HYDRATE IV INFUSION ADD-ON: CPT

## 2024-05-29 PROCEDURE — 87086 URINE CULTURE/COLONY COUNT: CPT

## 2024-05-29 PROCEDURE — 86901 BLOOD TYPING SEROLOGIC RH(D): CPT

## 2024-05-29 RX ORDER — CEPHALEXIN 500 MG/1
500 CAPSULE ORAL EVERY 12 HOURS SCHEDULED
Qty: 14 CAPSULE | Refills: 0 | Status: SHIPPED | OUTPATIENT
Start: 2024-05-29 | End: 2024-06-05

## 2024-05-29 RX ORDER — ACETAMINOPHEN 325 MG/1
975 TABLET ORAL ONCE
Status: COMPLETED | OUTPATIENT
Start: 2024-05-29 | End: 2024-05-29

## 2024-05-29 RX ORDER — KETOROLAC TROMETHAMINE 30 MG/ML
15 INJECTION, SOLUTION INTRAMUSCULAR; INTRAVENOUS ONCE
Status: COMPLETED | OUTPATIENT
Start: 2024-05-29 | End: 2024-05-29

## 2024-05-29 RX ADMIN — ACETAMINOPHEN 975 MG: 325 TABLET ORAL at 17:45

## 2024-05-29 RX ADMIN — SODIUM CHLORIDE 1000 ML: 0.9 INJECTION, SOLUTION INTRAVENOUS at 17:52

## 2024-05-29 RX ADMIN — KETOROLAC TROMETHAMINE 15 MG: 30 INJECTION, SOLUTION INTRAMUSCULAR; INTRAVENOUS at 18:26

## 2024-05-29 NOTE — ED PROVIDER NOTES
History  Chief Complaint   Patient presents with    Vaginal Bleeding     Pt gave birth , started Monday with vaginal bleeding, today soaking through 1 pad an hour. +dizziness     18-year-old female with recent  on 2024 at 39 weeks 2 days gestation complicated by postpartum hemorrhage due to lacerations and uterine atony requiring 3 units PRBCs presenting to ED for evaluation of vaginal bleeding x 3 days.  Since 10 AM this morning, has urinary dysuria, been using 1 pad per hour with passage of blood clots, soaking through the medium sized pads with depends underneath it.  Prior to today, was using 6 pads daily per 24 hours.  Associated lower abdominal cramping radiating into lower back, lightheadedness when standing up too quickly starting today.  Patient has not yet had a menstrual period since giving birth, but feels as if her bleeding is worse than her typical periods.  Denies any recent vaginal intercourse, no fevers, no chills.  No vaginal discharge.  Is breast-feeding and supplementing with formula.        Prior to Admission Medications   Prescriptions Last Dose Informant Patient Reported? Taking?   Iron, Ferrous Sulfate, 325 (65 Fe) MG TABS  Self Yes No   Sig: Take by mouth [place]   Patient not taking: Reported on 2024   Prenatal MV-Min-Fe Fum-FA-DHA (PRENATAL 1 PO)  Self Yes No   Sig: Take by mouth   acetaminophen (TYLENOL) 325 mg tablet   No No   Sig: Take 2 tablets (650 mg total) by mouth every 6 (six) hours as needed for mild pain, headaches or fever   benzocaine-menthol-lanolin-aloe (DERMOPLAST) 20-0.5 % topical spray   No No   Sig: Apply 1 Application topically every 6 (six) hours as needed for mild pain or irritation   docusate sodium (COLACE) 100 mg capsule   No No   Sig: Take 1 capsule (100 mg total) by mouth 2 (two) times a day   ibuprofen (MOTRIN) 600 mg tablet   No No   Sig: Take 1 tablet (600 mg total) by mouth every 6 (six) hours   norethindrone (Ortho Micronor) 0.35 MG  tablet   No No   Sig: Take 1 tablet (0.35 mg total) by mouth daily   valACYclovir (VALTREX) 500 mg tablet   No No   Sig: Take 2 tablets (1,000 mg total) by mouth daily for 28 days   witch hazel-glycerin (TUCKS) topical pad   No No   Sig: Apply 1 Pad topically every 4 (four) hours as needed for irritation      Facility-Administered Medications: None       Past Medical History:   Diagnosis Date    Abnormal body odor     Anemia     Constipation     Depression     Dysmenorrhea     Herpes     Hirsutism     Migraine     Ovarian cyst     Sleep apnea of         History reviewed. No pertinent surgical history.    Family History   Problem Relation Age of Onset    Drug abuse Mother     Alcohol abuse Mother     Drug abuse Father     Alcohol abuse Father     No Known Problems Maternal Grandmother     Diabetes Maternal Grandfather     No Known Problems Paternal Grandmother     Diabetes Paternal Grandfather     Schizoaffective Disorder  Maternal Uncle     Diabetes Maternal Uncle     Tourette syndrome Half-Sister     No Known Problems Half-Sister     No Known Problems Half-Sister     No Known Problems Half-Brother     No Known Problems Half-Brother     No Known Problems Half-Brother      I have reviewed and agree with the history as documented.    E-Cigarette/Vaping    E-Cigarette Use Never User      E-Cigarette/Vaping Substances    THC No     CBD No      Social History     Tobacco Use    Smoking status: Never    Smokeless tobacco: Never   Vaping Use    Vaping status: Never Used   Substance Use Topics    Alcohol use: Not Currently    Drug use: Not Currently     Types: Marijuana        Review of Systems   Constitutional:  Negative for chills and fever.   HENT:  Negative for ear pain and sore throat.    Eyes:  Negative for pain and visual disturbance.   Respiratory:  Negative for cough and shortness of breath.    Cardiovascular:  Negative for chest pain and palpitations.   Gastrointestinal:  Positive for abdominal pain.  Negative for vomiting.   Genitourinary:  Positive for dysuria and vaginal bleeding. Negative for hematuria.   Musculoskeletal:  Negative for arthralgias and back pain.   Skin:  Negative for color change and rash.   Neurological:  Negative for seizures and syncope.   All other systems reviewed and are negative.      Physical Exam  ED Triage Vitals   Temperature Pulse Respirations Blood Pressure SpO2   05/29/24 1441 05/29/24 1441 05/29/24 1441 05/29/24 1441 05/29/24 1441   97.8 °F (36.6 °C) 86 18 116/71 98 %      Temp Source Heart Rate Source Patient Position - Orthostatic VS BP Location FiO2 (%)   05/29/24 1441 05/29/24 1441 05/29/24 1441 05/29/24 1441 --   Oral Monitor Sitting Right arm       Pain Score       05/29/24 1745       2             Orthostatic Vital Signs  Vitals:    05/29/24 1441 05/29/24 1644 05/29/24 1652 05/29/24 1828   BP: 116/71  116/63 108/54   Pulse: 86 99  89   Patient Position - Orthostatic VS: Sitting   Sitting       Physical Exam  Vitals and nursing note reviewed. Exam conducted with a chaperone present.   Constitutional:       General: She is not in acute distress.     Appearance: She is well-developed.   HENT:      Head: Normocephalic and atraumatic.   Eyes:      Conjunctiva/sclera: Conjunctivae normal.   Cardiovascular:      Rate and Rhythm: Normal rate and regular rhythm.      Pulses:           Radial pulses are 2+ on the right side and 2+ on the left side.        Dorsalis pedis pulses are 2+ on the right side and 2+ on the left side.      Heart sounds: Normal heart sounds, S1 normal and S2 normal. No murmur heard.  Pulmonary:      Effort: Pulmonary effort is normal. No respiratory distress.      Breath sounds: Normal breath sounds and air entry.   Abdominal:      Palpations: Abdomen is soft.      Tenderness: There is abdominal tenderness in the suprapubic area.      Comments: Mild suprapubic tenderness to palpation.  Nonperitoneal.  No CVA tenderness bilaterally.     Genitourinary:      Comments:  exam performed with OB resident: Right labia minora and Pyridium with intact sutures, no drainage/warmth/erythema or skin changes.  Patient had significant pain with speculum exam, therefore it was stopped.  Gentle digital exam with no additional blood noted on glove.   Musculoskeletal:         General: No swelling.      Cervical back: Neck supple.      Right lower leg: No edema.      Left lower leg: No edema.      Comments: No midline C, T or L tenderness or step-offs.  No paraspinal tenderness in cervical, thoracic or lumbar region.   Skin:     General: Skin is warm and dry.      Capillary Refill: Capillary refill takes less than 2 seconds.   Neurological:      Mental Status: She is alert.   Psychiatric:         Mood and Affect: Mood normal.         ED Medications  Medications   sodium chloride 0.9 % bolus 1,000 mL (0 mL Intravenous Stopped 5/29/24 1852)   acetaminophen (TYLENOL) tablet 975 mg (975 mg Oral Given 5/29/24 1745)   ketorolac (TORADOL) injection 15 mg (15 mg Intravenous Given 5/29/24 1826)       Diagnostic Studies  Results Reviewed       Procedure Component Value Units Date/Time    Urine Microscopic [[id number]]  (Abnormal) Collected: 05/29/24 1823    Lab Status: Final result Specimen: Urine, Clean Catch Updated: 05/29/24 1906     RBC, UA Innumerable /hpf      WBC, UA 10-20 /hpf      Epithelial Cells Occasional /hpf      Bacteria, UA None Seen /hpf     Urine culture [[id number]] Collected: 05/29/24 1823    Lab Status: In process Specimen: Urine, Clean Catch Updated: 05/29/24 1906    UA w Reflex to Microscopic w Reflex to Culture [[id number]]  (Abnormal) Collected: 05/29/24 1823    Lab Status: Final result Specimen: Urine, Clean Catch Updated: 05/29/24 1852     Color, UA Light Orange     Clarity, UA Turbid     Specific Gravity, UA 1.025     pH, UA 6.0     Leukocytes, UA Moderate     Nitrite, UA Negative     Protein, UA Trace mg/dl      Glucose, UA Negative mg/dl      Ketones, UA Negative mg/dl       Urobilinogen, UA <2.0 mg/dl      Bilirubin, UA Negative     Occult Blood, UA Large    POCT pregnancy, urine [[id number]]  (Normal) Resulted: 05/29/24 1825    Lab Status: Final result Updated: 05/29/24 1826     EXT Preg Test, Ur Negative     Control Valid    Comprehensive metabolic panel [[id number]]  (Abnormal) Collected: 05/29/24 1643    Lab Status: Final result Specimen: Blood from Arm, Left Updated: 05/29/24 1707     Sodium 139 mmol/L      Potassium 3.8 mmol/L      Chloride 106 mmol/L      CO2 28 mmol/L      ANION GAP 5 mmol/L      BUN 15 mg/dL      Creatinine 0.58 mg/dL      Glucose 125 mg/dL      Calcium 9.2 mg/dL      AST 18 U/L      ALT 24 U/L      Alkaline Phosphatase 142 U/L      Total Protein 7.3 g/dL      Albumin 4.2 g/dL      Total Bilirubin 0.38 mg/dL      eGFR 133 ml/min/1.73sq m     Narrative:      National Kidney Disease Foundation guidelines for Chronic Kidney Disease (CKD):     Stage 1 with normal or high GFR (GFR > 90 mL/min/1.73 square meters)    Stage 2 Mild CKD (GFR = 60-89 mL/min/1.73 square meters)    Stage 3A Moderate CKD (GFR = 45-59 mL/min/1.73 square meters)    Stage 3B Moderate CKD (GFR = 30-44 mL/min/1.73 square meters)    Stage 4 Severe CKD (GFR = 15-29 mL/min/1.73 square meters)    Stage 5 End Stage CKD (GFR <15 mL/min/1.73 square meters)  Note: GFR calculation is accurate only with a steady state creatinine    Lipase [[id number]]  (Normal) Collected: 05/29/24 1643    Lab Status: Final result Specimen: Blood from Arm, Left Updated: 05/29/24 1707     Lipase 14 u/L     CBC and differential [[id number]] Collected: 05/29/24 1450    Lab Status: Final result Specimen: Blood from Arm, Left Updated: 05/29/24 1504     WBC 5.48 Thousand/uL      RBC 3.82 Million/uL      Hemoglobin 11.8 g/dL      Hematocrit 34.8 %      MCV 91 fL      MCH 30.9 pg      MCHC 33.9 g/dL      RDW 12.0 %      MPV 9.0 fL      Platelets 300 Thousands/uL      nRBC 0 /100 WBCs      Segmented % 62 %      Immature Grans % 0  "%      Lymphocytes % 29 %      Monocytes % 7 %      Eosinophils Relative 2 %      Basophils Relative 0 %      Absolute Neutrophils 3.41 Thousands/µL      Absolute Immature Grans 0.01 Thousand/uL      Absolute Lymphocytes 1.58 Thousands/µL      Absolute Monocytes 0.36 Thousand/µL      Eosinophils Absolute 0.10 Thousand/µL      Basophils Absolute 0.02 Thousands/µL                    No orders to display         Transabdominal ultrasound completed by OB resident with images below:            Procedures  Procedures      ED Course  ED Course as of 24 1939   Wed May 29, 2024   1624 OB senior resident, Dr. Judi Pabon on-call texted   1634 WBC: 5.48   1634 Hemoglobin: 11.8   1705 OB senior resident saw and evaluated patient at bedside, sutures from laceration are intact.  Attempted speculum exam, patient did not tolerate due to pain, speculum exam was stopped.  OB recommends IVFs for now. Will discuss case with pt's attending and get back to me with reccs   1806 Patient is stable for discharge from their perspective per OB   1854 Leukocytes, UA(!): Moderate   1854 Nitrite, UA: Negative   1854 Blood, UA(!): Large   1858 UA concerning for UTI.  No prior urine cultures in our system.  Will treat with Keflex twice daily x 7 days         CRAFFT      Flowsheet Row Most Recent Value   CRAFFT Initial Screen: During the past 12 months, did you:    1. Drink any alcohol (more than a few sips)?  No Filed at: 2024 1800   2. Smoke any marijuana or hashish No Filed at: 2024 1800   3. Use anything else to get high? (\"anything else\" includes illegal drugs, over the counter and prescription drugs, and things that you sniff or 'simon')? No Filed at: 2024 1800                                      Medical Decision Making  18-year-old female with recent  about a month ago complicated by a postpartum had to manage due to uterine atony and lacerations requiring 3 units of PRBCs presenting to ED for evaluation of " vaginal bleeding, dizziness, dysuria.    Differentials including but not limited to: first menstrual period since giving birth, UTI, wound dehiscence from prior laceration repair, pregnancy    Will obtain labs and discussed case with OB.  If significant lab abnormalities, will consider imaging.  However, do not feel strongly about obtaining imaging at this time.    OB saw and evaluated patient at bedside, felt her symptoms were due to first menstrual period, vital signs were stable, was stable for discharge from their perspective.      Hemoglobin stable, UPT negative, UA with cystitis.  No prior urine cultures in our system.  Discharged with Keflex twice daily x 7 days, OB follow-up, strict return precautions.  PCP follow-up for ALP elevation.    Amount and/or Complexity of Data Reviewed  External Data Reviewed: labs and notes.  Labs: ordered. Decision-making details documented in ED Course.    Risk  OTC drugs.  Prescription drug management.          Disposition  Final diagnoses:   Vaginal bleeding   UTI (urinary tract infection)   Elevated alkaline phosphatase level     Time reflects when diagnosis was documented in both MDM as applicable and the Disposition within this note       Time User Action Codes Description Comment    5/29/2024  5:20 PM Nancy Payan Add [N93.9] Vaginal bleeding     5/29/2024  6:56 PM NikdaNancy nguyen Add [N39.0] UTI (urinary tract infection)     5/29/2024  7:02 PM Nancy Payan Add [R74.8] Elevated alkaline phosphatase level           ED Disposition       ED Disposition   Discharge    Condition   Stable    Date/Time   Wed May 29, 2024 6176    Comment   Sharonda Kaplan discharge to home/self care.                   Follow-up Information       Follow up With Specialties Details Why Contact Info Additional Information    [facility] Follow up in 2 week(s) Please make an appointment to follow-up in 2 weeks. [address]  [facility] [id number] [phone] [facility] [address], [facility] [address], [place], [place], [address] [phone]            Discharge Medication List as of 5/29/2024  7:03 PM        START taking these medications    Details   cephalexin (KEFLEX) 500 mg capsule Take 1 capsule (500 mg total) by mouth every 12 (twelve) hours for 7 days, Starting Wed 5/29/2024, Until Wed 6/5/2024, Normal           CONTINUE these medications which have NOT CHANGED    Details   acetaminophen (TYLENOL) 325 mg tablet Take 2 tablets (650 mg total) by mouth every 6 (six) hours as needed for mild pain, headaches or fever, Starting Thu 4/25/2024, No Print      benzocaine-menthol-lanolin-aloe (DERMOPLAST) 20-0.5 % topical spray Apply 1 Application topically every 6 (six) hours as needed for mild pain or irritation, Starting Thu 4/25/2024, No Print      docusate sodium (COLACE) 100 mg capsule Take 1 capsule (100 mg total) by mouth 2 (two) times a day, Starting Fri 4/26/2024, Normal      ibuprofen (MOTRIN) 600 mg tablet Take 1 tablet (600 mg total) by mouth every 6 (six) hours, Starting Thu 4/25/2024, Normal      Iron, Ferrous Sulfate, 325 (65 Fe) MG TABS Take by mouth [place], Historical Med      norethindrone (Ortho Micronor) 0.35 MG tablet Take 1 tablet (0.35 mg total) by mouth daily, Starting Fri 5/17/2024, Normal      Prenatal MV-Min-Fe Fum-FA-DHA (PRENATAL 1 PO) Take by mouth, Historical Med      valACYclovir (VALTREX) 500 mg tablet Take 2 tablets (1,000 mg total) by mouth daily for 28 days, Starting Fri 3/22/2024, Until Fri 5/17/2024, Normal      witch hazel-glycerin (TUCKS) topical pad Apply 1 Pad topically every 4 (four) hours as needed for irritation, Starting Thu 4/25/2024, No Print           No discharge procedures on file.    PDMP Review       None             ED Provider  Attending physically available and evaluated Sharonda Kaplan. I managed the patient along  with the ED Attending.    Electronically Signed by           Nancy Payan MD  05/29/24 2373

## 2024-05-29 NOTE — ASSESSMENT & PLAN NOTE
Postpartum bleeding- passing small clots and soaking through a pad and depends. Bleeding now decreased.    /63   Pulse 86   Temp 97.8 °F (36.6 °C) (Oral)   Resp 18   SpO2 98%   Hemoglobin   Date Value Ref Range Status   05/29/2024 11.8 11.5 - 15.4 g/dL Final   09/25/2015 12.0 11.0 - 15.0 g/dL Final     Bedside TAUS with thin endometrial stripe seen; measuring 0.33cm. No evidence of retained placenta.      Discussed with patient that this bleeding may be due to her period, as she is not exclusively breastfeeding. Explained that she should call or re-present to the hospital if she has increased bleeding, such that she is soaking through a pad an hour for more than 2 hours, or if she starts to feel lightheadedness, chest pain, or SOB.

## 2024-05-29 NOTE — DISCHARGE INSTRUCTIONS
Your urine showed you have a UTI, please take Keflex every 12 hours for the next 7 days, finish entire duration even if feeling better.  Follow up with your primary caer doctor  Follow-up with OB/GYN this week.  Return to ED if any worsening symptoms.    Postpartum Bleeding   WHAT YOU NEED TO KNOW:   Postpartum bleeding is vaginal bleeding after childbirth. This bleeding is normal, whether your baby was born vaginally or by . It contains blood and the tissue that lined the inside of your uterus when you were pregnant.   DISCHARGE INSTRUCTIONS:   Call your local emergency number (911 in the [place]) if:   You are suddenly short of breath and feel lightheaded.    You have sudden chest pain.    You are breathing faster than normal.    Your heart is beating faster than normal.    Call your doctor or obstetrician if:   Your bleeding increases, or you have heavy bleeding that soaks 1 pad in 1 hour for 2 hours in a row.    You have a fever.     You pass large blood clots.    You feel dizzy.    You have a low back ache, abdominal pain or tenderness, or loss of appetite.    You are urinating less than usual, or not at all.    You have questions or concerns about your condition or care.    What to expect with postpartum bleeding:  Postpartum bleeding usually lasts at least 10 days, and may last longer than 6 weeks. Your bleeding may range from light (barely staining a pad) to heavy (soaking a pad in 1 hour). Usually, you have heavier bleeding right after childbirth, which slows over the next few weeks until it stops. The bleeding is red or dark brown with clots for the first 1 to 3 days. It then turns pink for several days, and then becomes a white or yellow discharge until it ends.  Follow up with your obstetrician as directed:  Do not have sex until your obstetrician says it is okay. Write down your questions so you remember to ask them during your visits.  © 2023 Information is for End User's use  only and may not be sold, redistributed or otherwise used for commercial purposes.  The above information is an  only. It is not intended as medical advice for individual conditions or treatments. Talk to your doctor, nurse or pharmacist before following any medical regimen to see if it is safe and effective for you.     103

## 2024-05-29 NOTE — Clinical Note
Sharonda Kaplan was seen and treated in our emergency department on 5/29/2024.                Diagnosis:     Sharonda  .    She may return on this date: 06/03/2024    Can return sooner if feeling okay     If you have any questions or concerns, please don't hesitate to call.      Nancy Payan MD    ______________________________           _______________          _______________  Hospital Representative                              Date                                Time

## 2024-05-29 NOTE — TELEPHONE ENCOUNTER
"Patient called office delivered vaginal 4/23/24 c/o post partum bleeding and pain  that started Monday.   She was a hemorrhage at delivery. She had stopped bleeding post partum and started again on MOnday , soaking though a pad in 2 hours with blood that runs down her legs. She also has small clots that collect on her pad. She has pain where her stitches are in her vaginal area and mild cramping abdominal pain, she rates a 3. She has lightheadedness as well. She denies fever and further symptoms at this time. Advised patient to go to ED at this time. She will go to [facility] ED. She cannot get a ride to [place] ED , advised her to go here. She will go to [facility] ED. [place] Text sent to Dr. Matthew.         Reason for Disposition   SEVERE vaginal bleeding (e.g., soaking 2 pads or tampons per hour) and present 2 or more hours    Answer Assessment - Initial Assessment Questions  1. ONSET: \"Describe your bleeding: is it getting worse, staying the same, improving, or stopping and starting?\"      MOnday  2. AMOUNT: \"How much bleeding are you having today?\"      - SPOTTING: spotting, or pinkish / brownish mucous discharge; does not fill panti-liner or pad     - MILD:  less than 1 pad / hour; less than patient's usual menstrual bleeding    - MODERATE: 1-2 pads / hour; 1 menstrual cup every 6 hours; small-medium blood clots (e.g., pea, grape, small coin)    - SEVERE: soaking 2 or more pads/hour for 2 or more hours; 1 menstrual cup every 2 hours; bleeding not contained by pads or continuous red blood from vagina; large blood clots (e.g., golf ball, large coin)       Severe  3. ABDOMINAL PAIN: \"Do you have any pain?\" \"How bad is the pain?\" \"What does it keep you from doing?\"      - MILD -  doesn't interfere with normal activities, abdomen soft and not tender to touch      - MODERATE - interferes with normal activities or awakens from sleep, tender to touch      - SEVERE - excruciating pain, doubled over, unable to do any " "normal activities        Cramping - rates it a 3.          6. OTHER SYMPTOMS: \"Do you have any other symptoms?\" (e.g., fever, chills, dizziness)      Lightheaded   7. DELIVERY DATE: \"When was your delivery date?\" \"Vaginal delivery or ?\"      Vaginal - 24    Answer Assessment - Initial Assessment Questions  1. SYMPTOM: \"What's the main symptom you're concerned about?\" (e.g., pain, swelling, stitch tightness)      Stitches burning   2. ONSET: \"When did the  *No Answer*  start?\"      Monday   3. DELIVERY DATE: \"When was your delivery date?\"      24  4. PAIN: \"Is there any pain?\" If Yes, ask: \"How bad is it?\" (Scale: 1-10; mild, moderate, severe)      3   5. FEVER: \"Do you have a fever?\" If Yes, ask: \"What is your temperature, how was it measured, and when did it start?\"      No   6. OTHER SYMPTOMS: \"Do you have any other symptoms?\" (e.g., abdominal pain, vaginal discharge, pain with urination)      Bleeding- moderate soaking through a pad in 2 hours . Severe bleeding. Cluster. Pain with urination. Cramping  - 3. Lightheaded.    Protocols used: Postpartum - Episiotomy Symptoms-ADULT-[facility], Postpartum - Vaginal Bleeding and Lochia-ADULT-[facility]    "

## 2024-05-29 NOTE — CONSULTS
Consultation - Gynecology  Sharonda Kaplan 18 y.o. female MRN: [id number]  Unit/Bed#: ED-30 Encounter: [id number]      Consult to obstetrics / gynecology  Consult performed by: Judi Pabon MD  Consult ordered by: Raegan Mcmullen DO        Chief Complaint   Patient presents with    Vaginal Bleeding     Pt gave birth , started Monday with vaginal bleeding, today soaking through 1 pad an hour. +dizziness       Vaginal bleeding  Assessment & Plan  Postpartum bleeding- passing small clots and soaking through a pad and depends. Bleeding now decreased.    /63   Pulse 86   Temp 97.8 °F (36.6 °C) (Oral)   Resp 18   SpO2 98%   Hemoglobin   Date Value Ref Range Status   2024 11.8 11.5 - 15.4 g/dL Final   2015 12.0 11.0 - 15.0 g/dL Final     Bedside TAUS with thin endometrial stripe seen; measuring 0.33cm. No evidence of retained placenta.      Discussed with patient that this bleeding may be due to her period, as she is not exclusively breastfeeding. Explained that she should call or re-present to the hospital if she has increased bleeding, such that she is soaking through a pad an hour for more than 2 hours, or if she starts to feel lightheadedness, chest pain, or SOB.    Pt seen w/ Dr. Kimball      History of Present Illness   Physician Requesting Consult: Raegan Mcmullen DO  Reason for Consult / Principal Problem: Postpartum vaginal bleeding  Subspeciality:  Ob/Gyn  HPI: Sharonda Kaplan is a 18 y.o. female who is s/p  on 24 who now presents with vaginal bleeding.  Her pregnancy was complicated by anemia and diet-controlled gestational diabetes.  Her delivery was complicated by a postpartum hemorrhage with QBL 1133 cc; this was due to multiple vaginal lacerations and initial uterine atony.  In total, she received two thirds of 1 unit of PRBCs and then 2 additional units of PRBCs.    Since discharge on postpartum day 2, she reports that her lochia was light and improving. Her bleeding  started again on Monday. It was initially light, and then became heavier today. She reports passing several small clots and soaking through a pad onto her Depends underwear. She felt slightly dizzy earlier, which prompted presentation.     She currently denies dizziness. She reports that her bleeding has improved since being in the ED.  She is breastfeeding and utilizing formula, and she has not yet had intercourse.    Review of Systems   Constitutional:  Negative for chills and fever.   Eyes:  Negative for visual disturbance.   Respiratory:  Negative for cough and shortness of breath.    Cardiovascular:  Negative for chest pain and palpitations.   Gastrointestinal:  Negative for abdominal pain, blood in stool, constipation, diarrhea, nausea and vomiting.   Neurological:  Negative for dizziness and headaches.       Historical Information   Past Medical History:   Diagnosis Date    Abnormal body odor     Anemia     Constipation     Depression     Dysmenorrhea     Herpes     Hirsutism     Migraine     Ovarian cyst     Sleep apnea of       History reviewed. No pertinent surgical history.  OB History    Para Term  AB Living   1 1 1 0 0 1   SAB IAB Ectopic Multiple Live Births   0 0 0 0 1      # Outcome Date GA Lbr Alec/2nd Weight Sex Type Anes PTL Lv   1 Term 24 39w2d / 00:57 3190 g (7 lb 0.5 oz) M Vag-Spont EPI N ZORAN     Family History   Problem Relation Age of Onset    Drug abuse Mother     Alcohol abuse Mother     Drug abuse Father     Alcohol abuse Father     No Known Problems Maternal Grandmother     Diabetes Maternal Grandfather     No Known Problems Paternal Grandmother     Diabetes Paternal Grandfather     Schizoaffective Disorder  Maternal Uncle     Diabetes Maternal Uncle     Tourette syndrome Half-Sister     No Known Problems Half-Sister     No Known Problems Half-Sister     No Known Problems Half-Brother     No Known Problems Half-Brother     No Known Problems Half-Brother       Social History   Social History     Substance and Sexual Activity   Alcohol Use Not Currently     Social History     Substance and Sexual Activity   Drug Use Not Currently    Types: Marijuana     Social History     Tobacco Use   Smoking Status Never   Smokeless Tobacco Never       Meds/Allergies   No current facility-administered medications for this encounter.         No Known Allergies    Objective   Vitals: Blood pressure 116/71, pulse 86, temperature 97.8 °F (36.6 °C), temperature source Oral, resp. rate 18, SpO2 98%, currently breastfeeding. There is no height or weight on file to calculate BMI.    No intake or output data in the 24 hours ending 05/29/24 1635    Invasive Devices       Peripheral Intravenous Line  Duration             Peripheral IV 05/29/24 Left Antecubital <1 day                    Physical Exam  Constitutional:       Appearance: Normal appearance.   Cardiovascular:      Rate and Rhythm: Normal rate and regular rhythm.      Heart sounds: Normal heart sounds. No murmur heard.     No friction rub. No gallop.   Pulmonary:      Effort: Pulmonary effort is normal. No respiratory distress.      Breath sounds: Normal breath sounds. No stridor. No wheezing, rhonchi or rales.   Abdominal:      General: There is no distension.      Palpations: Abdomen is soft. There is no mass.      Tenderness: There is no abdominal tenderness. There is no guarding.      Comments: Postpartum, fundus 4cm above pubic symphysis   Genitourinary:     Labia:         Right: No rash or lesion.         Left: No rash or lesion.       Comments: Stitches seen on right labia minora and at perineum; no bleeding noted from these areas. No surrounding erythema/warmth. Patient could not tolerate speculum exam. Gentle digital exam with no additional blood noted on glove. Pad with minimal bleeding; pt reports she changed in 30 min prior to assessment.  Musculoskeletal:         General: No swelling or tenderness.   Skin:     Findings:  No rash.   Neurological:      Mental Status: She is alert.         Lab Results:   Recent Results (from the past 24 hour(s))   CBC and differential    Collection Time: 05/29/24  2:50 PM   Result Value Ref Range    WBC 5.48 4.31 - 10.16 Thousand/uL    RBC 3.82 3.81 - 5.12 Million/uL    Hemoglobin 11.8 11.5 - 15.4 g/dL    Hematocrit 34.8 34.8 - 46.1 %    MCV 91 82 - 98 fL    MCH 30.9 26.8 - 34.3 pg    MCHC 33.9 31.4 - 37.4 g/dL    RDW 12.0 11.6 - 15.1 %    MPV 9.0 8.9 - 12.7 fL    Platelets 300 149 - 390 Thousands/uL    nRBC 0 /100 WBCs    Segmented % 62 43 - 75 %    Immature Grans % 0 0 - 2 %    Lymphocytes % 29 14 - 44 %    Monocytes % 7 4 - 12 %    Eosinophils Relative 2 0 - 6 %    Basophils Relative 0 0 - 1 %    Absolute Neutrophils 3.41 1.85 - 7.62 Thousands/µL    Absolute Immature Grans 0.01 0.00 - 0.20 Thousand/uL    Absolute Lymphocytes 1.58 0.60 - 4.47 Thousands/µL    Absolute Monocytes 0.36 0.17 - 1.22 Thousand/µL    Eosinophils Absolute 0.10 0.00 - 0.61 Thousand/µL    Basophils Absolute 0.02 0.00 - 0.10 Thousands/µL       Imaging:   Bedside TAUS with thin endometrial stripe, measured 0.33cm. No ultrasonographic evidence of retained products.      Judi Pabon MD  5/29/2024  4:35 PM

## 2024-05-30 LAB — BACTERIA UR CULT: NORMAL

## 2024-05-31 LAB — HOLD SPECIMEN: NORMAL

## 2024-05-31 NOTE — ED ATTENDING ATTESTATION
5/29/2024  IRaegan, DO, saw and evaluated the patient. I have discussed the patient with the resident/non-physician practitioner and agree with the resident's/non-physician practitioner's findings, Plan of Care, and MDM as documented in the resident's/non-physician practitioner's note, except where noted. All available labs and Radiology studies were reviewed.  I was present for key portions of any procedure(s) performed by the resident/non-physician practitioner and I was immediately available to provide assistance.       At this point I agree with the current assessment done in the Emergency Department.  I have conducted an independent evaluation of this patient a history and physical is as follows:    ED Course   38-year-old female presents the emergency department for evaluation of heavy vaginal bleeding.  Patient had a spontaneous vaginal delivery on April 23.  Patient had significant postpartum hemorrhage secondary to uterine atony and lacerations and required 3 units of packed red blood cells.  Patient states that she had vaginal bleeding for the past 3 days.  This morning it became much heavier patient states at 10 AM this morning She has been passing blood clots and soaking through medium pads hourly.  She is having lower abdominal cramping that radiates into her back.  She also feels slightly lightheaded when she stands up too quickly patient is also having dysuria since this morning.  She denies vaginal discharge.  She is nursing    Past medical history: Blood loss anemia, postpartum hemorrhage    Physical exam: Patient is awake and alert, no acute distress.  No pallor resting comfortably.  Pupils are equal and reactive.  Neck is supple without JVD.  Heart is regular rate and rhythm without ectopy.  Lungs are clear to auscultation.  Chest wall is nontender to palpation.  Abdomen is soft, tender in the lower abdomen without rebound or guarding.  nondistended with normal active bowel sounds.  No lower  extremity edema.  No focal motor or sensory deficits.  Speech is clear and appropriate    Assessment/plan: 18-year-old female presents with heavy vaginal bleeding 1 month postpartum.  Diagnosis includes but not limited to acute blood loss anemia, dysfunctional uterine bleeding, endometritis, heavy menses.  Suspect this is return of patient's menstrual cycle.  Will check H&H, patient deferred pelvic exam to the OB/GYN team.  Check urinalysis due to patient's symptoms of dysuria.  Will discuss case with OB/GYN        Critical Care Time  Procedures

## 2024-06-10 ENCOUNTER — APPOINTMENT (OUTPATIENT)
Dept: LAB | Facility: CLINIC | Age: 19
End: 2024-06-10
Payer: COMMERCIAL

## 2024-06-10 DIAGNOSIS — Z3A.37 37 WEEKS GESTATION OF PREGNANCY: ICD-10-CM

## 2024-06-10 DIAGNOSIS — O24.410 DIET CONTROLLED GESTATIONAL DIABETES MELLITUS (GDM) IN THIRD TRIMESTER: ICD-10-CM

## 2024-06-10 LAB
GLUCOSE 2H P 75 G GLC PO SERPL-MCNC: 125 MG/DL (ref 70–139)
GLUCOSE P FAST SERPL-MCNC: 81 MG/DL (ref 65–99)

## 2024-06-10 PROCEDURE — 82947 ASSAY GLUCOSE BLOOD QUANT: CPT

## 2024-06-10 PROCEDURE — 82950 GLUCOSE TEST: CPT

## 2024-06-10 PROCEDURE — 36415 COLL VENOUS BLD VENIPUNCTURE: CPT

## 2024-06-19 NOTE — PROGRESS NOTES
OB/GYN VISIT  Sharonda Kaplan  2024  5:06 PM    ASSESSMENT / PLAN:    Sharonda Kaplan is a 18 y.o.  female presenting for f/u of ED visit for heavy vaginal bleeding approximately 1 month postpartum, as well as dyspareunia and diastasis rectus.     Vaginal bleeding  Wnl of postpartum bleeding  Counseled that intermittent bleeding normal up to 8 weeks postpartum  Expect to have decrease in bleeding over next few days to week  If bleeding increases, return for f/u ultrasound  Counseled to go to ED if bleeding intensifies to point where she feel acutely ill e.g. dizzy, pre-syncopal  Dyspareunia   Exam significant for tense muscles of pelvic floor  Likely 2/2 vaginal delivery  Referral to Pelvic Floor PT placed   Diastasis rectus   Small separation of rectus muscles noted with patient sitting up, no diastasis rectus observed   Counseled to proceed with exercise as tolerated, abdomen will heal over time    SUBJECTIVE:    Sharonda Kaplan is a 18 y.o.  female presenting for ED f/u. She presented to the ED at approximately 4 weeks postpartum with heavy vaginal bleeding, at which time she was soaking through 2-3 pads per hour. HgB at that time > 11. She presents today for follow-up and states that she has continued to bleed on and off for a few days at a time since she was evaluated in the ED but not as heavily. Over the last few days she has had to use maybe 2-3 pads per day. She denies any changes in vision, headache, pre-syncope, dizziness, n/v/d. Other symptoms she would like to discuss today include pain with intercourse, specifically at her introitus on insertion, as well as the separation of her abdominal muscles and if she can resume exercise.     Of note, she currently uses POP for contraception. She has not missed a dose. She is currently breastfeeding and bottle feeding.     Past Medical History:   Diagnosis Date    Abnormal body odor     Anemia     Constipation     Depression      "Dysmenorrhea     Herpes     Hirsutism     Migraine     Ovarian cyst     Sleep apnea of         History reviewed. No pertinent surgical history.    OBJECTIVE:    Vitals:  Blood pressure 119/56, pulse 96, height 4' 11\" (1.499 m), weight 61.7 kg (136 lb), currently breastfeeding.Body mass index is 27.47 kg/m².    Physical Exam:    Physical Exam  Constitutional:       Appearance: Normal appearance.   Genitourinary:      Vulva normal.      Genitourinary Comments: Dark red blood noted at introitus and throughout vaginal vault  Noted discomfort with insertion of single finger into introitus  Thick, tense band of tissue in R lateral vaginal wall  Levator ani tense on palpation bilaterally   Cardiovascular:      Rate and Rhythm: Normal rate.   Pulmonary:      Effort: Pulmonary effort is normal.   Abdominal:      General: Abdomen is flat.      Palpations: Abdomen is soft.      Comments: Small separation of abdominal muscles upon patient sitting up  No diastasis rectus   Musculoskeletal:         General: Normal range of motion.   Neurological:      General: No focal deficit present.      Mental Status: She is alert and oriented to person, place, and time.   Skin:     General: Skin is warm and dry.   Vitals reviewed.       Judith Gee MD  2024  5:06 PM        "

## 2024-06-20 ENCOUNTER — OFFICE VISIT (OUTPATIENT)
Dept: OBGYN CLINIC | Facility: CLINIC | Age: 19
End: 2024-06-20

## 2024-06-20 VITALS
HEART RATE: 96 BPM | HEIGHT: 59 IN | DIASTOLIC BLOOD PRESSURE: 56 MMHG | WEIGHT: 136 LBS | BODY MASS INDEX: 27.42 KG/M2 | SYSTOLIC BLOOD PRESSURE: 119 MMHG

## 2024-06-20 DIAGNOSIS — N93.9 VAGINAL BLEEDING: ICD-10-CM

## 2024-06-20 PROCEDURE — 99203 OFFICE O/P NEW LOW 30 MIN: CPT | Performed by: OBSTETRICS & GYNECOLOGY

## 2024-06-23 DIAGNOSIS — Z30.41 ORAL CONTRACEPTIVE PILL SURVEILLANCE: ICD-10-CM

## 2024-06-23 RX ORDER — ACETAMINOPHEN AND CODEINE PHOSPHATE 120; 12 MG/5ML; MG/5ML
1 SOLUTION ORAL DAILY
Qty: 84 TABLET | Refills: 0 | Status: SHIPPED | OUTPATIENT
Start: 2024-06-23

## 2024-07-05 DIAGNOSIS — A60.00 RECURRENT GENITAL HERPES: ICD-10-CM

## 2024-07-05 RX ORDER — VALACYCLOVIR HYDROCHLORIDE 500 MG/1
1000 TABLET, FILM COATED ORAL DAILY
Qty: 56 TABLET | Refills: 2 | Status: SHIPPED | OUTPATIENT
Start: 2024-07-05 | End: 2024-09-27

## 2024-07-29 ENCOUNTER — EVALUATION (OUTPATIENT)
Dept: PHYSICAL THERAPY | Facility: CLINIC | Age: 19
End: 2024-07-29
Payer: COMMERCIAL

## 2024-07-29 DIAGNOSIS — N94.10 DYSPAREUNIA IN FEMALE: Primary | ICD-10-CM

## 2024-07-29 DIAGNOSIS — N39.46 MIXED STRESS AND URGE URINARY INCONTINENCE: ICD-10-CM

## 2024-07-29 DIAGNOSIS — M62.08 DIASTASIS OF RECTUS ABDOMINIS: ICD-10-CM

## 2024-07-29 PROCEDURE — 97162 PT EVAL MOD COMPLEX 30 MIN: CPT | Performed by: PHYSICAL THERAPIST

## 2024-07-29 PROCEDURE — 97530 THERAPEUTIC ACTIVITIES: CPT | Performed by: PHYSICAL THERAPIST
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
## 2024-08-05 NOTE — PROGRESS NOTES
"Subjective      Sharonda Kaplan is a 18 y.o. female who presents for annual well woman exam. Periods are currently every other week since the delivery of her baby 3 months. No intermenstrual bleeding, spotting, or discharge. She states that she last had spotting on 8/3/24.     She denies any significant medical or surgical history.     GYN:  Denies vaginal discharge, labial erythema or lesions, dyspareunia.   Contraception: Micronor.   Patient is sexually active with 1 partner.   She has never had a pap smear.    Denies gynecologic surgeries.    OB:   female   Pregnancies were complicated by hemorrhage, 3u pRBCs, possible transfusion rxn. Breast and formula feeding              Future fertility: desires    :  Denies dysuria, urinary frequency or urgency.  Denies hematuria, incontinence.  Has some right flank tenderness    Breast:   Denies breast mass, skin changes, dimpling, reddening, nipple retraction.   Currently lactating.   Mammogram not indicated   Patient does denies have a family history of breast, endometrial, or ovarian ca.      General:  Diet: endorses a good diet  Exercise: a little bit of strength training.   Work: does not work.   ETOH use: no  Tobacco use: no  Recreational drug use: no    Screening:  Cervical cancer: not indicated.  Mammogram and Colonoscopy not indicated  STD screening: up to date.    Review of Systems  Pertinent items are noted in HPI.      Objective      /75 (BP Location: Left arm, Patient Position: Sitting, Cuff Size: Adult)   Pulse 87   Resp 18   Ht 4' 11\" (1.499 m)   Wt 61.4 kg (135 lb 6.4 oz)   LMP 2024 (Exact Date)   BMI 27.35 kg/m²     Physical Exam  Vitals and nursing note reviewed.   Constitutional:       General: She is not in acute distress.     Appearance: She is well-developed.   HENT:      Head: Normocephalic and atraumatic.   Eyes:      Conjunctiva/sclera: Conjunctivae normal.   Cardiovascular:      Rate and Rhythm: Normal rate and regular " rhythm.      Heart sounds: No murmur heard.  Pulmonary:      Effort: Pulmonary effort is normal. No respiratory distress.      Breath sounds: Normal breath sounds.   Abdominal:      Palpations: Abdomen is soft.      Tenderness: There is no abdominal tenderness.   Genitourinary:     Comments: Normal appearing external genitalia, closed appearing parous cervix, minimal vaginal discharge  Bimanual exam: no adnexal masses, mobile, non-bulky uterus  Musculoskeletal:         General: No swelling.      Cervical back: Neck supple.   Skin:     General: Skin is warm and dry.   Neurological:      Mental Status: She is alert.   Psychiatric:         Mood and Affect: Mood normal.         Behavior: Behavior normal.       Assessment     Sharonda is a 17 yo  here for her annual exam.     Plan       1.  Routine well woman exam done today.  2.  Pap and HPV:Pap with HPV was not done today.  Current ASCCP Guidelines reviewed.   3.  The patient agreeed to STD testing.  chlamydia and gonorrhea testing performed. Safe sex practices have been discussed.  4. The patient is sexually active. She is already taking contraception and options have been discussed.    5. The following were reviewed in today's visit: STD testing, family planning choices, adequate intake of calcium and vitamin D, exercise, and healthy diet.  6. Patient to return to office in 12 months for annual.       Uzma Birch MD  OB/GYN PGY-4  2024  11:39 AM

## 2024-08-06 ENCOUNTER — ANNUAL EXAM (OUTPATIENT)
Dept: OBGYN CLINIC | Facility: CLINIC | Age: 19
End: 2024-08-06

## 2024-08-06 ENCOUNTER — OFFICE VISIT (OUTPATIENT)
Dept: PHYSICAL THERAPY | Facility: CLINIC | Age: 19
End: 2024-08-06
Payer: COMMERCIAL

## 2024-08-06 VITALS
HEART RATE: 87 BPM | HEIGHT: 59 IN | BODY MASS INDEX: 27.3 KG/M2 | WEIGHT: 135.4 LBS | SYSTOLIC BLOOD PRESSURE: 115 MMHG | DIASTOLIC BLOOD PRESSURE: 75 MMHG | RESPIRATION RATE: 18 BRPM

## 2024-08-06 DIAGNOSIS — Z00.00 ENCOUNTER FOR ANNUAL PHYSICAL EXAM: Primary | ICD-10-CM

## 2024-08-06 DIAGNOSIS — N94.10 DYSPAREUNIA IN FEMALE: Primary | ICD-10-CM

## 2024-08-06 DIAGNOSIS — M62.08 DIASTASIS OF RECTUS ABDOMINIS: ICD-10-CM

## 2024-08-06 DIAGNOSIS — N39.46 MIXED STRESS AND URGE URINARY INCONTINENCE: ICD-10-CM

## 2024-08-06 PROBLEM — O99.013 ANEMIA AFFECTING PREGNANCY IN THIRD TRIMESTER: Status: RESOLVED | Noted: 2024-03-07 | Resolved: 2024-08-06

## 2024-08-06 PROBLEM — O24.410 DIET CONTROLLED GESTATIONAL DIABETES MELLITUS (GDM) IN THIRD TRIMESTER: Status: RESOLVED | Noted: 2024-02-29 | Resolved: 2024-08-06

## 2024-08-06 PROBLEM — O36.8190 DECREASED FETAL MOVEMENT AFFECTING MANAGEMENT OF MOTHER, ANTEPARTUM: Status: RESOLVED | Noted: 2024-04-22 | Resolved: 2024-08-06

## 2024-08-06 PROBLEM — Z87.59 HISTORY OF POSTPARTUM HEMORRHAGE: Status: ACTIVE | Noted: 2024-04-24

## 2024-08-06 PROCEDURE — 99395 PREV VISIT EST AGE 18-39: CPT | Performed by: OBSTETRICS & GYNECOLOGY

## 2024-08-06 PROCEDURE — 87491 CHLMYD TRACH DNA AMP PROBE: CPT

## 2024-08-06 PROCEDURE — 97110 THERAPEUTIC EXERCISES: CPT

## 2024-08-06 PROCEDURE — 97112 NEUROMUSCULAR REEDUCATION: CPT

## 2024-08-06 PROCEDURE — 87591 N.GONORRHOEAE DNA AMP PROB: CPT

## 2024-08-06 PROCEDURE — 97530 THERAPEUTIC ACTIVITIES: CPT
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
## 2024-08-07 LAB
C TRACH DNA SPEC QL NAA+PROBE: NEGATIVE
N GONORRHOEA DNA SPEC QL NAA+PROBE: NEGATIVE

## 2024-08-13 NOTE — PROGRESS NOTES
The patient was seen today for an ultrasound.  Please see ultrasound report (located under Ob Procedures) for additional details.   Thank you very much for allowing us to participate in the care of this very nice patient.  Should you have any questions, please do not hesitate to contact me.     Salo Donaldson MD FACOG  Attending Physician, Maternal-Fetal Medicine  [facility]   1 Principal Discharge DX:	Vaginal bleeding

## 2024-08-15 ENCOUNTER — OFFICE VISIT (OUTPATIENT)
Dept: PHYSICAL THERAPY | Facility: CLINIC | Age: 19
End: 2024-08-15
Payer: COMMERCIAL

## 2024-08-15 DIAGNOSIS — M62.08 DIASTASIS OF RECTUS ABDOMINIS: ICD-10-CM

## 2024-08-15 DIAGNOSIS — N39.46 MIXED STRESS AND URGE URINARY INCONTINENCE: ICD-10-CM

## 2024-08-15 DIAGNOSIS — N94.10 DYSPAREUNIA IN FEMALE: Primary | ICD-10-CM

## 2024-08-15 PROCEDURE — 97110 THERAPEUTIC EXERCISES: CPT

## 2024-08-15 PROCEDURE — 97112 NEUROMUSCULAR REEDUCATION: CPT
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
## 2024-08-21 ENCOUNTER — OFFICE VISIT (OUTPATIENT)
Dept: PHYSICAL THERAPY | Facility: CLINIC | Age: 19
End: 2024-08-21
Payer: COMMERCIAL

## 2024-08-21 DIAGNOSIS — N94.10 DYSPAREUNIA IN FEMALE: Primary | ICD-10-CM

## 2024-08-21 DIAGNOSIS — N39.46 MIXED STRESS AND URGE URINARY INCONTINENCE: ICD-10-CM

## 2024-08-21 DIAGNOSIS — M62.08 DIASTASIS OF RECTUS ABDOMINIS: ICD-10-CM

## 2024-08-21 PROCEDURE — 97110 THERAPEUTIC EXERCISES: CPT | Performed by: PHYSICAL THERAPIST

## 2024-08-21 PROCEDURE — 97530 THERAPEUTIC ACTIVITIES: CPT | Performed by: PHYSICAL THERAPIST

## 2024-08-21 PROCEDURE — 97112 NEUROMUSCULAR REEDUCATION: CPT | Performed by: PHYSICAL THERAPIST
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
## 2024-08-22 ENCOUNTER — TELEPHONE (OUTPATIENT)
Dept: FAMILY MEDICINE CLINIC | Facility: CLINIC | Age: 19
End: 2024-08-22

## 2024-08-22 NOTE — TELEPHONE ENCOUNTER
Patient had an appointment today with Dr. Camacho, she was late, we unfortunately had to reschedule.  She was instructed to call the insurance prior to her October appointment and change PCP to our practice.

## 2024-08-26 ENCOUNTER — EVALUATION (OUTPATIENT)
Dept: PHYSICAL THERAPY | Facility: CLINIC | Age: 19
End: 2024-08-26
Payer: COMMERCIAL

## 2024-08-26 DIAGNOSIS — N39.46 MIXED STRESS AND URGE URINARY INCONTINENCE: ICD-10-CM

## 2024-08-26 DIAGNOSIS — N94.10 DYSPAREUNIA IN FEMALE: Primary | ICD-10-CM

## 2024-08-26 DIAGNOSIS — M62.08 DIASTASIS OF RECTUS ABDOMINIS: ICD-10-CM

## 2024-08-26 PROCEDURE — 97530 THERAPEUTIC ACTIVITIES: CPT | Performed by: PHYSICAL THERAPIST

## 2024-08-26 PROCEDURE — 97140 MANUAL THERAPY 1/> REGIONS: CPT | Performed by: PHYSICAL THERAPIST
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
## 2024-09-05 ENCOUNTER — APPOINTMENT (OUTPATIENT)
Dept: PHYSICAL THERAPY | Facility: CLINIC | Age: 19
End: 2024-09-05
Payer: COMMERCIAL

## 2024-09-09 ENCOUNTER — OFFICE VISIT (OUTPATIENT)
Dept: PHYSICAL THERAPY | Facility: CLINIC | Age: 19
End: 2024-09-09
Payer: COMMERCIAL

## 2024-09-09 DIAGNOSIS — N94.10 DYSPAREUNIA IN FEMALE: Primary | ICD-10-CM

## 2024-09-09 DIAGNOSIS — N39.46 MIXED STRESS AND URGE URINARY INCONTINENCE: ICD-10-CM

## 2024-09-09 DIAGNOSIS — M62.08 DIASTASIS OF RECTUS ABDOMINIS: ICD-10-CM

## 2024-09-09 PROCEDURE — 97530 THERAPEUTIC ACTIVITIES: CPT | Performed by: PHYSICAL THERAPIST

## 2024-09-09 PROCEDURE — 97112 NEUROMUSCULAR REEDUCATION: CPT | Performed by: PHYSICAL THERAPIST

## 2024-09-09 PROCEDURE — 97110 THERAPEUTIC EXERCISES: CPT | Performed by: PHYSICAL THERAPIST
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
## 2024-09-16 ENCOUNTER — APPOINTMENT (OUTPATIENT)
Dept: PHYSICAL THERAPY | Facility: CLINIC | Age: 19
End: 2024-09-16
Payer: COMMERCIAL

## 2024-09-23 ENCOUNTER — APPOINTMENT (OUTPATIENT)
Dept: PHYSICAL THERAPY | Facility: CLINIC | Age: 19
End: 2024-09-23
Payer: COMMERCIAL
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
## 2024-09-30 ENCOUNTER — EVALUATION (OUTPATIENT)
Dept: PHYSICAL THERAPY | Facility: CLINIC | Age: 19
End: 2024-09-30
Payer: COMMERCIAL

## 2024-09-30 DIAGNOSIS — M62.08 DIASTASIS OF RECTUS ABDOMINIS: ICD-10-CM

## 2024-09-30 DIAGNOSIS — N39.46 MIXED STRESS AND URGE URINARY INCONTINENCE: ICD-10-CM

## 2024-09-30 DIAGNOSIS — N94.10 DYSPAREUNIA IN FEMALE: Primary | ICD-10-CM

## 2024-09-30 PROCEDURE — 97140 MANUAL THERAPY 1/> REGIONS: CPT | Performed by: PHYSICAL THERAPIST

## 2024-09-30 PROCEDURE — 97530 THERAPEUTIC ACTIVITIES: CPT | Performed by: PHYSICAL THERAPIST
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
## 2024-10-07 ENCOUNTER — OFFICE VISIT (OUTPATIENT)
Dept: PHYSICAL THERAPY | Facility: CLINIC | Age: 19
End: 2024-10-07
Payer: COMMERCIAL

## 2024-10-07 DIAGNOSIS — M62.08 DIASTASIS OF RECTUS ABDOMINIS: ICD-10-CM

## 2024-10-07 DIAGNOSIS — Z30.41 ORAL CONTRACEPTIVE PILL SURVEILLANCE: ICD-10-CM

## 2024-10-07 DIAGNOSIS — N39.46 MIXED STRESS AND URGE URINARY INCONTINENCE: ICD-10-CM

## 2024-10-07 DIAGNOSIS — N94.10 DYSPAREUNIA IN FEMALE: Primary | ICD-10-CM

## 2024-10-07 PROCEDURE — 97110 THERAPEUTIC EXERCISES: CPT | Performed by: PHYSICAL THERAPIST

## 2024-10-07 PROCEDURE — 97112 NEUROMUSCULAR REEDUCATION: CPT | Performed by: PHYSICAL THERAPIST

## 2024-10-07 PROCEDURE — 97530 THERAPEUTIC ACTIVITIES: CPT | Performed by: PHYSICAL THERAPIST
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
## 2024-10-13 RX ORDER — ACETAMINOPHEN AND CODEINE PHOSPHATE 120; 12 MG/5ML; MG/5ML
0.35 SOLUTION ORAL DAILY
Qty: 84 TABLET | Refills: 0 | Status: SHIPPED | OUTPATIENT
Start: 2024-10-13

## 2024-10-21 ENCOUNTER — APPOINTMENT (OUTPATIENT)
Dept: PHYSICAL THERAPY | Facility: CLINIC | Age: 19
End: 2024-10-21
Payer: COMMERCIAL

## 2024-10-28 ENCOUNTER — EVALUATION (OUTPATIENT)
Dept: PHYSICAL THERAPY | Facility: CLINIC | Age: 19
End: 2024-10-28
Payer: COMMERCIAL

## 2024-10-28 ENCOUNTER — TELEPHONE (OUTPATIENT)
Dept: FAMILY MEDICINE CLINIC | Facility: CLINIC | Age: 19
End: 2024-10-28

## 2024-10-28 DIAGNOSIS — N94.10 DYSPAREUNIA IN FEMALE: Primary | ICD-10-CM

## 2024-10-28 DIAGNOSIS — N39.46 MIXED STRESS AND URGE URINARY INCONTINENCE: ICD-10-CM

## 2024-10-28 DIAGNOSIS — M62.08 DIASTASIS OF RECTUS ABDOMINIS: ICD-10-CM

## 2024-10-28 PROCEDURE — 97530 THERAPEUTIC ACTIVITIES: CPT | Performed by: PHYSICAL THERAPIST
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
## 2024-10-29 ENCOUNTER — OFFICE VISIT (OUTPATIENT)
Dept: FAMILY MEDICINE CLINIC | Facility: CLINIC | Age: 19
End: 2024-10-29
Payer: COMMERCIAL

## 2024-10-29 VITALS
WEIGHT: 136 LBS | RESPIRATION RATE: 14 BRPM | DIASTOLIC BLOOD PRESSURE: 88 MMHG | OXYGEN SATURATION: 98 % | BODY MASS INDEX: 26.7 KG/M2 | SYSTOLIC BLOOD PRESSURE: 118 MMHG | HEIGHT: 60 IN | TEMPERATURE: 97.5 F | HEART RATE: 94 BPM

## 2024-10-29 DIAGNOSIS — N92.1 MENORRHAGIA WITH IRREGULAR CYCLE: ICD-10-CM

## 2024-10-29 DIAGNOSIS — M79.671 PAIN OF RIGHT HEEL: ICD-10-CM

## 2024-10-29 DIAGNOSIS — M24.80 GENERALIZED HYPERMOBILITY OF JOINTS: ICD-10-CM

## 2024-10-29 DIAGNOSIS — F33.1 MODERATE RECURRENT MAJOR DEPRESSION (HCC): ICD-10-CM

## 2024-10-29 DIAGNOSIS — R21 PAPULAR ERUPTION: ICD-10-CM

## 2024-10-29 DIAGNOSIS — I49.9 IRREGULAR HEART RHYTHM: ICD-10-CM

## 2024-10-29 DIAGNOSIS — Z13.220 SCREENING, LIPID: ICD-10-CM

## 2024-10-29 DIAGNOSIS — R74.8 ELEVATED ALKALINE PHOSPHATASE LEVEL: Primary | ICD-10-CM

## 2024-10-29 DIAGNOSIS — F41.9 ANXIETY: ICD-10-CM

## 2024-10-29 PROBLEM — G44.209 TENSION TYPE HEADACHE: Status: RESOLVED | Noted: 2018-01-15 | Resolved: 2024-10-29

## 2024-10-29 PROBLEM — N93.9 VAGINAL BLEEDING: Status: RESOLVED | Noted: 2024-05-29 | Resolved: 2024-10-29

## 2024-10-29 PROCEDURE — 99204 OFFICE O/P NEW MOD 45 MIN: CPT | Performed by: FAMILY MEDICINE

## 2024-10-29 RX ORDER — CREATINE 100 %
1 POWDER (GRAM) MISCELLANEOUS 3 TIMES DAILY
COMMUNITY

## 2024-10-29 NOTE — PATIENT INSTRUCTIONS
Schedule an appointment with dermatology for the skin rash/lesions  Schedule an appointment with rheumatology for the hypermobile joints  Get the lab work done fasting  Call your gynecologist about the irregular bleeding concerns  Follow up in 3 months, sooner if needed.

## 2024-10-29 NOTE — PROGRESS NOTES
"Ambulatory Visit  Name: Sharonda Kaplan      : 2005      MRN: [id number]  Encounter Provider: Kaylan Camacho MD  Encounter Date: 10/29/2024   Encounter department: [facility]    Assessment & Plan  Elevated alkaline phosphatase level         Heel pain, bilateral            Chief Complaint   Patient presents with    New Patient Visit     Here to establish care and address concerns regarding ongoing GI issues, elevated LFTs, feet are \"crunching\" when walking, in the heel area. Stopped breastfeeding one week ago and is asking about a pill to help dry up her supply. Declines flu shot today.        History of Present Illness   {?Quick Links Encounters * My Last Note * Last Note in Specialty * Snapshot * Since Last Visit * History :19240}  HPI    History obtained from : patient  Review of Systems  {Select to Display PMH (Optional):70580}      Objective   {?Quick Links Trend Vitals * Enter New Vitals * Results Review * Timeline (Adult) * Labs * Imaging * Cardiology * Procedures * Lung Cancer Screening * Surgical eConsent :01482}  /88   Pulse 94   Temp 97.5 °F (36.4 °C)   Resp 14   Ht 5' (1.524 m)   Wt 61.7 kg (136 lb)   SpO2 98%   Breastfeeding No   BMI 26.56 kg/m²     Physical Exam  {Administrative / Billing Section (Optional):97765}  "

## 2024-10-29 NOTE — PROGRESS NOTES
"Ambulatory Visit  Name: Sharonda Kaplan      : 2005      MRN: [id number]  Encounter Provider: Kaylan Camacho MD  Encounter Date: 10/29/2024   Encounter department: [facility]    Assessment & Plan  Elevated alkaline phosphatase level  Elevated alk phos once.   Repeat cmp and check cbc, tsh, vit D    Orders:    Comprehensive metabolic panel; Future    Vitamin D 25 hydroxy; Future    CBC and differential; Future    TSH, 3rd generation with Free T4 reflex; Future    Pain of right heel  \"Crunching\" sound when she is on flat ground/floor. Not appreciable on exam today.         Irregular heart rhythm  Irreg rhythm on exam- EKG w/ sinus arrythmia    Orders:    Comprehensive metabolic panel; Future    CBC and differential; Future    TSH, 3rd generation with Free T4 reflex; Future    POCT ECG    Generalized hypermobility of joints  Notable hypermobility raises question of connective tissue d/o  Check labs  Referrals placed as noted for rheum/derm    Orders:    Comprehensive metabolic panel; Future    Vitamin D 25 hydroxy; Future    Ambulatory Referral to Dermatology; Future    Ambulatory Referral to Rheumatology; Future    MARK Screen w/ Reflex to Titer/Pattern; Future    C-reactive protein; Future    Menorrhagia with irregular cycle  Labs as noted  Pt to d/w gyn  Orders:    CBC and differential; Future    Prolactin; Future    Papular eruption  Refer to derm   Hypopigmented small papules, ? Related to hypermobility/ possible EDS?     Orders:    CBC and differential; Future    Ambulatory Referral to Dermatology; Future    Screening, lipid    Orders:    Lipid Panel with Direct LDL reflex; Future    Anxiety         Moderate recurrent major depression (HCC)  Prev was treated w prozac and did well with this  She doesn't feel she wants to start medication b/c she is concerned that hormonal fluctuations may be impacting mood and wants to talk to gyn about this first.  Discussed w/ her to " "contact our office if she feels she would like to restart porzac of if depressive symptoms are worse.  She will continue seeing therapist regularly as well            Chief Complaint   Patient presents with    New Patient Visit     Here to establish care and address concerns regarding ongoing GI issues, elevated LFTs, feet are \"crunching\" when walking, in the heel area. Stopped breastfeeding one week ago and is asking about a pill to help dry up her supply. Declines flu shot today.        History of Present Illness     History of Present Illness  The patient is a 18-year-old female here to establish care and review abdominal concerns, heel pain with crunching, and an elevated alkaline phosphatase level.    She has not consulted a doctor since she was 15. Two years ago, she experienced severe vomiting, with 13 episodes in a single day, leading to an emergency room visit where she was diagnosed with inflammation of the large intestine. She has noticed a possible intolerance to sugar over the past two years. During her pregnancy, she had gestational diabetes, which was managed through diet without the need for medication. A postpartum glucose tolerance test showed normal results. Her gastrointestinal symptoms were intermittent during her pregnancy. She has a lifelong history of constipation, which she manages with daily cheese consumption. She reports no current nausea or vomiting but experiences bloating and discomfort after eating.    Her menstrual cycle is irregular, occurring every other week, and she has not yet consulted her gynecologist about this. She recently stopped breastfeeding her son, who is doing well. She has noticed a decrease in her milk production, currently producing about 3 ounces every 3 days. She has a history of elevated alkaline phosphatase levels. Her premenstrual syndrome (PMS) has worsened to the point where she has started therapy. She experiences anger and depression, which have " intensified since starting birth control. She has a history of depression and was on Prozac for a short period. She is considering resuming Prozac but wants to check her hormone levels first. She reports no external stressors or thoughts of self-harm. She has a strong jerez with her son and reports no negative thoughts towards him. She has no history of substance abuse. She attempted suicide at the age of 11 or 12 and was in therapy from ages 11 to 16. She has recently resumed therapy and has had one session so far.    She enjoys skincare and kathy as hobbies. She has a supportive partner who is the father of her child. She has noticed light spots on her chest and is unsure if they are acne scars. She experiences intermittent joint pain but reports no swelling or aching. She has hypermobility in her fingers and legs. She started puberty early, with pubic hair development before  and acne around age 4. Her periods started around age 8 or 9 and were irregular before her pregnancy. She reports no changes in peripheral vision or other vision issues besides needing glasses.    She experiences a crunching sensation when she rolls her foot, particularly on the right side. This has been ongoing for years and does not cause her to stop her activities. She has difficulty walking straight and often bumps into things, even with glasses. She has a history of bruising and cutting herself due to this issue. She also reports that her right foot often crosses over her left foot when she walks.    FAMILY HISTORY  She has a family history of mental health issues and substance abuse. There is a family history of multiple suicide attempts. She is not aware of any autoimmune issues in her family.     Review of Systems  Objective     /88   Pulse 94   Temp 97.5 °F (36.4 °C)   Resp 14   Ht 5' (1.524 m)   Wt 61.7 kg (136 lb)   SpO2 98%   Breastfeeding No   BMI 26.56 kg/m²     Physical Exam    Physical  "Exam  Vitals and nursing note reviewed.   Constitutional:       General: She is not in acute distress.     Appearance: Normal appearance. She is well-developed. She is not ill-appearing.   HENT:      Head: Normocephalic and atraumatic.      Nose: Nose normal.      Mouth/Throat:      Mouth: Mucous membranes are moist.   Eyes:      Conjunctiva/sclera: Conjunctivae normal.   Neck:      Vascular: No carotid bruit.   Cardiovascular:      Rate and Rhythm: Normal rate and regular rhythm.      Heart sounds: No murmur heard.  Pulmonary:      Effort: Pulmonary effort is normal. No respiratory distress.      Breath sounds: Normal breath sounds.   Abdominal:      Palpations: Abdomen is soft.      Tenderness: There is no abdominal tenderness.   Musculoskeletal:      Cervical back: Neck supple.      Right lower leg: No edema.      Left lower leg: No edema.      Comments: Pt demonstrates rolling heel on floor and notes that this would typically elicit the \"crunching\" sound, but she states it is not happening today.  Hypermobile joints- shoulders, fingers, wrists   Lymphadenopathy:      Cervical: No cervical adenopathy.   Skin:     General: Skin is warm and dry.      Comments: Small hypopigmented papules, no central umbilication- upper chest, back  Acne upper back/ forehead   Neurological:      Mental Status: She is alert and oriented to person, place, and time.   Psychiatric:         Mood and Affect: Mood normal.         Behavior: Behavior normal.         "

## 2024-10-29 NOTE — ASSESSMENT & PLAN NOTE
Elevated alk phos once.   Repeat cmp and check cbc, tsh, vit D    Orders:    Comprehensive metabolic panel; Future    Vitamin D 25 hydroxy; Future    CBC and differential; Future    TSH, 3rd generation with Free T4 reflex; Future

## 2024-10-30 PROCEDURE — 93000 ELECTROCARDIOGRAM COMPLETE: CPT | Performed by: FAMILY MEDICINE

## 2024-11-02 PROBLEM — F33.1 MODERATE RECURRENT MAJOR DEPRESSION (HCC): Status: ACTIVE | Noted: 2018-06-25

## 2024-11-02 NOTE — ASSESSMENT & PLAN NOTE
Prev was treated w prozac and did well with this  She doesn't feel she wants to start medication b/c she is concerned that hormonal fluctuations may be impacting mood and wants to talk to gyn about this first.  Discussed w/ her to contact our office if she feels she would like to restart porzac of if depressive symptoms are worse.  She will continue seeing therapist regularly as well

## 2024-11-08 NOTE — ASSESSMENT & PLAN NOTE
PPH: QBL 1133 labial, multiple vaginal lacs  initial atony; s/p TXA, methergine, pitocin; packing and bass in place after delivery with plan for Coags wnl, Fibringoen 469 after delivery    Hemoglobin   Date Value Ref Range Status   04/25/2024 9.5 (L) 11.5 - 15.4 g/dL Final   04/25/2024 6.7 (L) 11.5 - 15.4 g/dL Final   04/24/2024 6.4 (L) 11.5 - 15.4 g/dL Final   09/25/2015 12.0 11.0 - 15.0 g/dL Final      - S/p 2 units PRBC ordered  - Over jail through first unit of PRBC on 4/25, patient felt shaky, had the chills and unit was held  - SARA testing negative and ruled out immune mediated transfusion reactions, however, LDH increased from 156 to 237 and there are RBCs in the urine (20-30), non-immune hemolytic reaction was not excluded     [facility] ([facility])  [facility] Medicine  Discharge Summary      Patient Name: Greg Wnyn  MRN: [id number]  [facility]: [id number]  Patient Class: OP- Observation  Admission Date: 11/6/2024  Hospital Length of Stay: 0 days  Discharge Date and Time:  11/08/2024 12:28 PM  Attending Physician: Lowell Bowen MD   Discharging Provider: Ashley Nava PA-C  Primary Care Provider: Jyoti Freire NP    Primary Care Team: Networked reference to record PCT     HPI:   Patient is a 20 year old male with medical history of obesity, fatty liver disease and diverticulitis who presented to the ED with sharp left lower quadrant abdominal pain.  Symptoms initially intermittent but became persistent.  He saw his PCP and she prescribed cipro and flagyl.  Patient has had fever, nausea and vomiting.     Admitted for sepsis secondary to diverticulitis.               * No surgery found *      Hospital Course:   PT HD stable on room air.  Repeat labs pending.  Will advance diet to bland.  Possible discharge this afternoon if able to tolerate.      11/8 Pt able to tolerate bland diet.  Will discharge with 7 days of Augmentin for diverticulitis.  F/u outpatient PCP.       Goals of Care Treatment Preferences:  Code Status: Full Code      SDOH Screening:  The patient was screened for utility difficulties, food insecurity, transport difficulties, housing insecurity, and interpersonal safety and there were no concerns identified this admission.     Consults:   Consults (From admission, onward)          Status Ordering Provider     Inpatient consult to General Surgery  Once        Provider:  Jeovany Estrada MD    Completed DOC JOYCE            Renal/  CKD stage 2 due to type 2 diabetes mellitus  Creatine stable for now. BMP reviewed- noted Estimated Creatinine Clearance: 215.5 mL/min (based on SCr of 0.8 mg/dL). according to latest data. Based on current GFR, CKD stage is stage 2 - GFR 60-89.  Monitor UOP and serial  BMP and adjust therapy as needed. Renally dose meds. Avoid nephrotoxic medications and procedures.    stable    ID  * Sepsis  This patient does have evidence of infective focus  My overall impression is sepsis.  Source: Abdominal  Antibiotics given-   Antibiotics (72h ago, onward)      Start     Stop Route Frequency Ordered    11/07/24 0515  piperacillin-tazobactam (ZOSYN) 4.5 g in D5W 100 mL IVPB (MB+)         11/09/24 0514 IV Every 8 hours (non-standard times) 11/06/24 2158          Latest lactate reviewed-  Recent Labs   Lab 11/06/24  1620   LACTATE 1.2       Organ dysfunction indicated by no organ dysfunction     Fluid challenge received 1 L bolus in the ED     Post- resuscitation assessment No - Post resuscitation assessment not needed       Will Not start Pressors- Levophed for MAP of 65  Source control achieved by: IV zosyn  Elevated HR, fever, leukocytosis     Resolved.  Discharge this afternoon.       Endocrine  Type 2 diabetes mellitus, without long-term current use of insulin  S/s insulin placed       GI  Diverticulitis of sigmoid colon  Recurrent and seen on CTAP.  No perforation present   Leukocytosis, fever, elevated heart rate  Surgery consulted and recommend elective colectomy.    IV zosyn   NPO and surgery attempting clear liquid diet  IV fluids     11/8 Improving and tolerating bland diet.  Discharge with 7 day course of augmentin.          Final Active Diagnoses:    Diagnosis Date Noted POA    PRINCIPAL PROBLEM:  Sepsis [A41.9] 11/07/2024 Yes    Type 2 diabetes mellitus, without long-term current use of insulin [E11.9] 11/07/2024 Yes    CKD stage 2 due to type 2 diabetes mellitus [E11.22, N18.2] 07/03/2024 Yes    Diverticulitis of sigmoid colon [K57.32] 07/02/2024 Yes      Problems Resolved During this Admission:       Discharged Condition: good    Disposition: Home or Self Care    Follow Up:   Follow-up Information       Jyoti Freire NP Follow up in 1 week(s).    Specialty: Internal  Medicine  Contact information:  [address] [address]  [phone]                           Patient Instructions:   No discharge procedures on file.    Significant Diagnostic Studies: see A&P     Pending Diagnostic Studies:       Procedure Component Value Units Date/Time    Giardia / Cryptosporidum, EIA [[id number]] Collected: 11/07/24 0432    Order Status: Sent Lab Status: In process Updated: 11/07/24 1037    Specimen: Stool     Rotavirus antigen, stool [[id number]] Collected: 11/07/24 0432    Order Status: Sent Lab Status: In process Updated: 11/07/24 1037    Specimen: Stool     Stool Exam-Ova,Cysts,Parasites [[id number]] Collected: 11/07/24 0432    Order Status: Sent Lab Status: In process Updated: 11/07/24 1046    Specimen: Stool            Medications:  Reconciled Home Medications:      Medication List        START taking these medications      amoxicillin-clavulanate 875-125mg 875-125 mg per tablet  Commonly known as: AUGMENTIN  Take 1 tablet by mouth every 12 (twelve) hours. for 7 days            CONTINUE taking these medications      empagliflozin 10 mg tablet  Commonly known as: JARDIANCE  Take 1 tablet (10 mg total) by mouth once daily.            STOP taking these medications      ciprofloxacin HCl 500 MG tablet  Commonly known as: CIPRO     HYDROcodone-acetaminophen 7.5-325 mg per tablet  Commonly known as: NORCO     metFORMIN 500 MG tablet  Commonly known as: GLUCOPHAGE     metroNIDAZOLE 500 MG tablet  Commonly known as: FLAGYL              Indwelling Lines/Drains at time of discharge:   Lines/Drains/Airways       None                   Time spent on the discharge of patient: 25 minutes         Ashley Nava PA-C  Department of Hospital Medicine  [facility] ([facility])

## 2025-03-13 ENCOUNTER — OFFICE VISIT (OUTPATIENT)
Dept: OBGYN CLINIC | Facility: CLINIC | Age: 20
End: 2025-03-13
Payer: COMMERCIAL

## 2025-03-13 ENCOUNTER — TELEPHONE (OUTPATIENT)
Age: 20
End: 2025-03-13

## 2025-03-13 VITALS
WEIGHT: 121 LBS | HEIGHT: 60 IN | DIASTOLIC BLOOD PRESSURE: 80 MMHG | BODY MASS INDEX: 23.75 KG/M2 | SYSTOLIC BLOOD PRESSURE: 120 MMHG

## 2025-03-13 DIAGNOSIS — N92.1 BREAKTHROUGH BLEEDING ON BIRTH CONTROL PILLS: ICD-10-CM

## 2025-03-13 DIAGNOSIS — N92.1 MENORRHAGIA WITH IRREGULAR CYCLE: Primary | ICD-10-CM

## 2025-03-13 PROCEDURE — 99213 OFFICE O/P EST LOW 20 MIN: CPT

## 2025-03-13 NOTE — TELEPHONE ENCOUNTER
*OV NOTE NEEDS TO BE SIGNED* PA for Drospirenone 4 MG TABS SUBMITTED to [employer]    via    []CMM-KEY:   [x]Surescripts-Case ID #: [id number]   []Availity-Auth ID #  []Faxed to plan   []Other website   []Phone call Case ID #     [x]PA sent as URGENT    All office notes, labs and other pertaining documents and studies sent. Clinical questions answered. Awaiting determination from insurance company.     Turnaround time for your insurance to make a decision on your Prior Authorization can take 7-21 business days.

## 2025-03-13 NOTE — PROGRESS NOTES
- Patient to finish current pill pack of Micronor and then begin Slynd. We discussed difference in the medications, usage, risks, benefits, side effects. Would prefer to try Slynd and if this does not help irregular menses, can try a COCP.   - We discussed other options for birth control today including COCP, Patch, Nuvaring, Depo provera injection, Nexplanon and IUD. She is only interested in pills at this time.     ASSESSMENT/PLAN:    Encounter Diagnosis     ICD-10-CM    1. Menorrhagia with irregular cycle  N92.1 Ferritin      2. Breakthrough bleeding on birth control pills  N92.1 Drospirenone 4 MG TABS        SUBJECTIVE/HPI:      Patient ID: Sharonda Kaplan 2005     Sharonda Kaplan is a 19 y.o.  presenting to the office for birth control consultation. She is currently on Micronor and has been on this since delivery of her child 2024. Since she started taking Micronor she has been having very frequent, irregular menses. For the past 2 months, bleeding has been more consistent, occurring every 2 weeks. She feels she is bleeding more often than she is not. Periods have been heavy and she does get significant cramping with them. She occasionally takes tylenol/ibuprofen. She had noticed mood changes during menses including fluctuating moods and depressive symptoms which she often has at baseline as well.   She has history of migraine with visual aura but has not had a migraine like this for ~4 years.   She denies blood clots in legs/lungs, cigarette use.       HISTORY:    Patient Active Problem List   Diagnosis    Chronic constipation    Tonsillolith    Moderate recurrent major depression (HCC)    Anxiety    Suicidal behavior without attempted self-injury    Breast feeding status of mother    History of postpartum hemorrhage    Elevated alkaline phosphatase level       No Known Allergies      Current Outpatient Medications:     Drospirenone 4 MG TABS, Take 1 tablet by mouth daily, Disp: 84  tablet, Rfl: 1    Iron, Ferrous Sulfate, 325 (65 Fe) MG TABS, Take by mouth [place], Disp: , Rfl:     acetaminophen (TYLENOL) 325 mg tablet, Take 2 tablets (650 mg total) by mouth every 6 (six) hours as needed for mild pain, headaches or fever (Patient not taking: Reported on 3/13/2025), Disp: , Rfl:     benzocaine-menthol-lanolin-aloe (DERMOPLAST) 20-0.5 % topical spray, Apply 1 Application topically every 6 (six) hours as needed for mild pain or irritation (Patient not taking: Reported on 3/13/2025), Disp: , Rfl:     Creatine POWD, Use 1 Scoop 3 (three) times a day, Disp: , Rfl:     docusate sodium (COLACE) 100 mg capsule, Take 1 capsule (100 mg total) by mouth 2 (two) times a day (Patient not taking: Reported on 3/13/2025), Disp: 60 capsule, Rfl: 0    ibuprofen (MOTRIN) 600 mg tablet, Take 1 tablet (600 mg total) by mouth every 6 (six) hours (Patient not taking: Reported on 3/13/2025), Disp: 30 tablet, Rfl: 0    Prenatal MV-Min-Fe Fum-FA-DHA (PRENATAL 1 PO), Take by mouth (Patient not taking: Reported on 10/29/2024), Disp: , Rfl:     valACYclovir (VALTREX) 500 mg tablet, Take 2 tablets (1,000 mg total) by mouth daily, Disp: 56 tablet, Rfl: 2    witch hazel-glycerin (TUCKS) topical pad, Apply 1 Pad topically every 4 (four) hours as needed for irritation (Patient not taking: Reported on 10/29/2024), Disp: , Rfl:     OB History          1    Para   1    Term   1       0    AB   0    Living   1         SAB   0    IAB   0    Ectopic   0    Multiple   0    Live Births   1                 Past Medical History:   Diagnosis Date    Abnormal body odor     Anemia     Constipation     Depression     Dysmenorrhea     Herpes     Hirsutism     Migraine     Ovarian cyst     Sleep apnea of          History reviewed. No pertinent surgical history.     Family History   Problem Relation Age of Onset    Drug abuse Mother     Alcohol abuse Mother     Drug abuse Father     Alcohol abuse Father     No Known  Problems Maternal Grandmother     Diabetes Maternal Grandfather     No Known Problems Paternal Grandmother     Diabetes Paternal Grandfather     Schizoaffective Disorder  Maternal Uncle     Diabetes Maternal Uncle     Tourette syndrome Half-Sister     No Known Problems Half-Sister     No Known Problems Half-Sister     No Known Problems Half-Brother     No Known Problems Half-Brother     No Known Problems Half-Brother     Heart attack Neg Hx     Stroke Neg Hx     Cancer Neg Hx         OBJECTIVE:    Visit Vitals  /80   Ht 5' (1.524 m)   Wt 54.9 kg (121 lb)   LMP 03/06/2025 (Exact Date)   BMI 23.63 kg/m²   OB Status Having periods   Smoking Status Never   BSA 1.51 m²     Physical Exam  Constitutional:       Appearance: Normal appearance.   HENT:      Head: Normocephalic and atraumatic.   Cardiovascular:      Rate and Rhythm: Normal rate and regular rhythm.      Heart sounds: Normal heart sounds. No murmur heard.  Pulmonary:      Effort: Pulmonary effort is normal.      Breath sounds: Normal breath sounds. No wheezing.   Abdominal:      General: Abdomen is flat.      Palpations: Abdomen is soft.   Neurological:      General: No focal deficit present.      Mental Status: She is alert and oriented to person, place, and time.   Psychiatric:         Mood and Affect: Mood normal.         Behavior: Behavior normal.   Vitals and nursing note reviewed.           I have spent a total time of 25 minutes in caring for this patient on the day of the visit/encounter including Risks and benefits of tx options, Instructions for management, Patient and family education, Documenting in the medical record, Reviewing/placing orders in the medical record (including tests, medications, and/or procedures), and Obtaining or reviewing history  .

## 2025-03-15 ENCOUNTER — APPOINTMENT (OUTPATIENT)
Dept: LAB | Facility: HOSPITAL | Age: 20
End: 2025-03-15
Payer: COMMERCIAL

## 2025-03-15 DIAGNOSIS — R21 PAPULAR ERUPTION: ICD-10-CM

## 2025-03-15 DIAGNOSIS — R74.8 ELEVATED ALKALINE PHOSPHATASE LEVEL: ICD-10-CM

## 2025-03-15 DIAGNOSIS — N92.1 MENORRHAGIA WITH IRREGULAR CYCLE: ICD-10-CM

## 2025-03-15 DIAGNOSIS — M24.80 GENERALIZED HYPERMOBILITY OF JOINTS: ICD-10-CM

## 2025-03-15 DIAGNOSIS — I49.9 IRREGULAR HEART RHYTHM: ICD-10-CM

## 2025-03-15 LAB
25(OH)D3 SERPL-MCNC: 25.2 NG/ML (ref 30–100)
BASOPHILS # BLD AUTO: 0.01 THOUSANDS/ÂΜL (ref 0–0.1)
BASOPHILS NFR BLD AUTO: 0 % (ref 0–1)
CRP SERPL QL: 1.1 MG/L
EOSINOPHIL # BLD AUTO: 0.06 THOUSAND/ÂΜL (ref 0–0.61)
EOSINOPHIL NFR BLD AUTO: 2 % (ref 0–6)
ERYTHROCYTE [DISTWIDTH] IN BLOOD BY AUTOMATED COUNT: 11.5 % (ref 11.6–15.1)
FERRITIN SERPL-MCNC: 29 NG/ML (ref 11–307)
HCT VFR BLD AUTO: 38.2 % (ref 34.8–46.1)
HGB BLD-MCNC: 12.5 G/DL (ref 11.5–15.4)
IMM GRANULOCYTES # BLD AUTO: 0.01 THOUSAND/UL (ref 0–0.2)
IMM GRANULOCYTES NFR BLD AUTO: 0 % (ref 0–2)
LYMPHOCYTES # BLD AUTO: 1.2 THOUSANDS/ÂΜL (ref 0.6–4.47)
LYMPHOCYTES NFR BLD AUTO: 37 % (ref 14–44)
MCH RBC QN AUTO: 29.5 PG (ref 26.8–34.3)
MCHC RBC AUTO-ENTMCNC: 32.7 G/DL (ref 31.4–37.4)
MCV RBC AUTO: 90 FL (ref 82–98)
MONOCYTES # BLD AUTO: 0.2 THOUSAND/ÂΜL (ref 0.17–1.22)
MONOCYTES NFR BLD AUTO: 6 % (ref 4–12)
NEUTROPHILS # BLD AUTO: 1.76 THOUSANDS/ÂΜL (ref 1.85–7.62)
NEUTS SEG NFR BLD AUTO: 55 % (ref 43–75)
NRBC BLD AUTO-RTO: 0 /100 WBCS
PLATELET # BLD AUTO: 339 THOUSANDS/UL (ref 149–390)
PMV BLD AUTO: 9.6 FL (ref 8.9–12.7)
PROLACTIN SERPL-MCNC: 6.37 NG/ML (ref 3.34–26.72)
RBC # BLD AUTO: 4.24 MILLION/UL (ref 3.81–5.12)
TSH SERPL DL<=0.05 MIU/L-ACNC: 0.74 UIU/ML (ref 0.45–4.5)
WBC # BLD AUTO: 3.24 THOUSAND/UL (ref 4.31–10.16)

## 2025-03-15 PROCEDURE — 86225 DNA ANTIBODY NATIVE: CPT

## 2025-03-15 PROCEDURE — 86038 ANTINUCLEAR ANTIBODIES: CPT

## 2025-03-15 PROCEDURE — 36415 COLL VENOUS BLD VENIPUNCTURE: CPT

## 2025-03-15 PROCEDURE — 84443 ASSAY THYROID STIM HORMONE: CPT

## 2025-03-15 PROCEDURE — 82728 ASSAY OF FERRITIN: CPT

## 2025-03-15 PROCEDURE — 85025 COMPLETE CBC W/AUTO DIFF WBC: CPT

## 2025-03-15 PROCEDURE — 86140 C-REACTIVE PROTEIN: CPT

## 2025-03-15 PROCEDURE — 82306 VITAMIN D 25 HYDROXY: CPT

## 2025-03-15 PROCEDURE — 84146 ASSAY OF PROLACTIN: CPT

## 2025-03-17 ENCOUNTER — APPOINTMENT (OUTPATIENT)
Dept: LAB | Facility: HOSPITAL | Age: 20
End: 2025-03-17
Payer: COMMERCIAL

## 2025-03-17 ENCOUNTER — RESULTS FOLLOW-UP (OUTPATIENT)
Dept: OBGYN CLINIC | Facility: CLINIC | Age: 20
End: 2025-03-17

## 2025-03-17 DIAGNOSIS — I49.9 IRREGULAR HEART RHYTHM: ICD-10-CM

## 2025-03-17 DIAGNOSIS — R74.8 ELEVATED ALKALINE PHOSPHATASE LEVEL: ICD-10-CM

## 2025-03-17 DIAGNOSIS — Z13.220 SCREENING, LIPID: ICD-10-CM

## 2025-03-17 DIAGNOSIS — M24.80 GENERALIZED HYPERMOBILITY OF JOINTS: ICD-10-CM

## 2025-03-17 LAB
ALBUMIN SERPL BCG-MCNC: 4.8 G/DL (ref 3.5–5)
ALP SERPL-CCNC: 68 U/L (ref 34–104)
ALT SERPL W P-5'-P-CCNC: 16 U/L (ref 7–52)
ANION GAP SERPL CALCULATED.3IONS-SCNC: 8 MMOL/L (ref 4–13)
AST SERPL W P-5'-P-CCNC: 16 U/L (ref 13–39)
BILIRUB SERPL-MCNC: 0.87 MG/DL (ref 0.2–1)
BUN SERPL-MCNC: 8 MG/DL (ref 5–25)
CALCIUM SERPL-MCNC: 9.7 MG/DL (ref 8.4–10.2)
CHLORIDE SERPL-SCNC: 103 MMOL/L (ref 96–108)
CHOLEST SERPL-MCNC: 121 MG/DL (ref ?–200)
CO2 SERPL-SCNC: 29 MMOL/L (ref 21–32)
CREAT SERPL-MCNC: 0.65 MG/DL (ref 0.6–1.3)
GFR SERPL CREATININE-BSD FRML MDRD: 127 ML/MIN/1.73SQ M
GLUCOSE P FAST SERPL-MCNC: 85 MG/DL (ref 65–99)
HDLC SERPL-MCNC: 44 MG/DL
LDLC SERPL CALC-MCNC: 69 MG/DL (ref 0–100)
POTASSIUM SERPL-SCNC: 4 MMOL/L (ref 3.5–5.3)
PROT SERPL-MCNC: 7.5 G/DL (ref 6.4–8.4)
SODIUM SERPL-SCNC: 140 MMOL/L (ref 135–147)
TRIGL SERPL-MCNC: 40 MG/DL (ref ?–150)

## 2025-03-17 PROCEDURE — 80053 COMPREHEN METABOLIC PANEL: CPT

## 2025-03-17 PROCEDURE — 36415 COLL VENOUS BLD VENIPUNCTURE: CPT

## 2025-03-17 PROCEDURE — 80061 LIPID PANEL: CPT

## 2025-03-18 NOTE — TELEPHONE ENCOUNTER
PA for Drospirenone 4 MG TABS APPROVED     Date(s) approved March 17, 2025 to March 17, 2026     Case #: [id number]     Patient advised by          [][employer] Message  [x]Phone call   []LMOM  []L/M to call office as no active Communication consent on file  [x]Unable to leave detailed message as VM not approved on Communication consent       Pharmacy advised by    [x]Fax  []Phone call  []Secure Chat    Approval letter scanned into Media Yes

## 2025-03-19 ENCOUNTER — RESULTS FOLLOW-UP (OUTPATIENT)
Dept: FAMILY MEDICINE CLINIC | Facility: CLINIC | Age: 20
End: 2025-03-19

## 2025-03-20 ENCOUNTER — OFFICE VISIT (OUTPATIENT)
Dept: FAMILY MEDICINE CLINIC | Facility: CLINIC | Age: 20
End: 2025-03-20
Payer: COMMERCIAL

## 2025-03-20 VITALS
SYSTOLIC BLOOD PRESSURE: 112 MMHG | RESPIRATION RATE: 16 BRPM | OXYGEN SATURATION: 99 % | HEIGHT: 60 IN | DIASTOLIC BLOOD PRESSURE: 68 MMHG | WEIGHT: 120.6 LBS | HEART RATE: 98 BPM | TEMPERATURE: 98 F | BODY MASS INDEX: 23.68 KG/M2

## 2025-03-20 DIAGNOSIS — Z00.00 ANNUAL PHYSICAL EXAM: Primary | ICD-10-CM

## 2025-03-20 DIAGNOSIS — E55.9 VITAMIN D DEFICIENCY: ICD-10-CM

## 2025-03-20 DIAGNOSIS — D72.819 LEUKOPENIA, UNSPECIFIED TYPE: ICD-10-CM

## 2025-03-20 DIAGNOSIS — F33.1 MODERATE RECURRENT MAJOR DEPRESSION (HCC): ICD-10-CM

## 2025-03-20 DIAGNOSIS — E61.1 IRON DEFICIENCY: ICD-10-CM

## 2025-03-20 LAB
DSDNA IGG SERPL IA-ACNC: 2 IU/ML (ref ?–15)
NUCLEAR IGG SER IA-RTO: 0.2 RATIO (ref ?–1)

## 2025-03-20 PROCEDURE — 99214 OFFICE O/P EST MOD 30 MIN: CPT | Performed by: FAMILY MEDICINE

## 2025-03-20 PROCEDURE — 99395 PREV VISIT EST AGE 18-39: CPT | Performed by: FAMILY MEDICINE

## 2025-03-20 RX ORDER — FLUOXETINE 10 MG/1
10 TABLET, FILM COATED ORAL DAILY
Qty: 30 TABLET | Refills: 2 | Status: SHIPPED | OUTPATIENT
Start: 2025-03-20

## 2025-03-20 NOTE — ASSESSMENT & PLAN NOTE
- Reports depressive sx, including irritability and crying at [address]  - Prescription for Prozac 10 mg provided, to be taken in the morning  - wants to start with lowest dose possible.   - she tolerated prozac in the past  - Discussed potential side effects (upset stomach, headache) and advised these usually resolve - Advised to reach out if not feeling well on the medication  Follow up in 2 months, sooner if needed    Orders:  •  FLUoxetine 10 MG tablet; Take 1 tablet (10 mg total) by mouth daily

## 2025-03-20 NOTE — ASSESSMENT & PLAN NOTE
- Advised to take vitamin D more regularly or double the dose three times a week if difficult to remember daily

## 2025-03-20 NOTE — PROGRESS NOTES
Assessment & Plan  Annual physical exam  Reviewed labs  Wbc low- repeat as noted  Encouraged healthy diet/exercise, self-care  Continue routine dental care  Continue routine gyn care  Utd Tdap       Leukopenia, unspecified type  Repeat in one month  Orders:  •  CBC and differential; Future    Moderate recurrent major depression (HCC)  - Reports depressive sx, including irritability and crying at [address]  - Prescription for Prozac 10 mg provided, to be taken in the morning  - wants to start with lowest dose possible.   - she tolerated prozac in the past  - Discussed potential side effects (upset stomach, headache) and advised these usually resolve - Advised to reach out if not feeling well on the medication  Follow up in 2 months, sooner if needed    Orders:  •  FLUoxetine 10 MG tablet; Take 1 tablet (10 mg total) by mouth daily    Vitamin D deficiency  - Advised to take vitamin D more regularly or double the dose three times a week if difficult to remember daily       Iron deficiency  - Iron levels are on the lower end of normal  - Advised to continue taking iron supplement two to three times a week  - Include iron-rich foods in diet (beans, red meat, leafy greens                    Most recent labs reviewed       Subjective:     Sharonda is a 19 y.o. female here today and has the below chronic conditions:    Patient Active Problem List   Diagnosis   • Chronic constipation   • Tonsillolith   • Moderate recurrent major depression (HCC)   • Anxiety   • Suicidal behavior without attempted self-injury   • History of postpartum hemorrhage   • Elevated alkaline phosphatase level   • Vitamin D deficiency   • Iron deficiency     Current Outpatient Medications   Medication Sig Dispense Refill   • Calcium Carbonate (CALCIUM 500 PO) Take by mouth     • Cholecalciferol (VITAMIN D3) 1,000 units tablet Take 1,000 Units by mouth daily     • FLUoxetine 10 MG tablet Take 1 tablet (10 mg total) by mouth daily 30 tablet 2    • Iron, Ferrous Sulfate, 325 (65 Fe) MG TABS Take by mouth [place]     • valACYclovir (VALTREX) 500 mg tablet Take 2 tablets (1,000 mg total) by mouth daily 56 tablet 2     No current facility-administered medications for this visit.          No chief complaint on file.    HPI:  - CC above per clinical staff and reviewed.    History of Present Illness  The patient presents for annual PE and concerns/follow up.  Her son (age 11 months) and significant other are with her.    Annual Physical (Submitted on 3/20/2025)  Diet/Nutrition choices: low calorie diet  Exercise choices: moderate cardiovascular exercise, strength training exercises  Sleep choices: 4-6 hours of sleep on average  Dental choices: regular dental visits, brushes teeth twice daily, floss regularly  Do you currently have an OB/GYN provider that you routinely follow with?: Yes  Last menstrual cycle (if applicable):: 3/4/2025  Any history of sexual transmitted disease/infection?: Yes  Contraception: oral contraceptives  Do you have an advance directive/living will?: No  Do you have a durable power of  (POA)?: No      Mood Concerns  - She reports experiencing significant mood concerns, predominantly characterized by anger/ irritability and frequent crying at things that would not normally make her emotional- like [place] [employer].  - She has previously been on Prozac but is uncertain about its efficacy due to the situational factors at the time.  - tolerated it well  - She expresses a desire to start with a low dose of Prozac as a trial.    Temperature Regulation Issues  - sometimes cold, sometimes warm, sweats at times    Supplemental information: She is not currently breastfeeding, having ceased when her child was 21-year-old. She does not report any chest pain or respiratory difficulties. Her sleep duration is approximately 4 to 6 hours per night. She maintains a regular exercise regimen, although not as frequent as she would prefer,  and consumes a diet rich in fish. She is currently on an iron supplement and reports normal bowel movements. She is not taking vitamin D consistently.    Taking iron supplement    The following portions of the patient's history were reviewed and updated as appropriate: allergies, current medications, past family history, past medical history, past social history, past surgical history and problem list.    ROS:  Review of Systems   As noted above.    Objective:      /68   Pulse 98   Temp 98 °F (36.7 °C) (Temporal)   Resp 16   Ht 5' (1.524 m)   Wt 54.7 kg (120 lb 9.6 oz)   LMP 03/04/2025   SpO2 99%   BMI 23.55 kg/m²   BP Readings from Last 3 Encounters:   03/20/25 112/68   03/13/25 120/80   10/29/24 118/88     Wt Readings from Last 3 Encounters:   03/20/25 54.7 kg (120 lb 9.6 oz)   03/13/25 54.9 kg (121 lb)   10/29/24 61.7 kg (136 lb)               Physical Exam:   Physical Exam  Vitals and nursing note reviewed.   Constitutional:       General: She is not in acute distress.     Appearance: Normal appearance. She is well-developed. She is not ill-appearing.      Comments: Interacting well w son   HENT:      Head: Normocephalic and atraumatic.   Eyes:      Conjunctiva/sclera: Conjunctivae normal.   Neck:      Vascular: No carotid bruit.   Cardiovascular:      Rate and Rhythm: Normal rate and regular rhythm.      Heart sounds: Normal heart sounds. No murmur heard.  Pulmonary:      Effort: Pulmonary effort is normal. No respiratory distress.      Breath sounds: Normal breath sounds. No wheezing.   Abdominal:      Palpations: Abdomen is soft.      Tenderness: There is no abdominal tenderness. There is no guarding or rebound.   Musculoskeletal:      Cervical back: Neck supple.      Right lower leg: No edema.      Left lower leg: No edema.   Lymphadenopathy:      Cervical: No cervical adenopathy.   Skin:     General: Skin is warm and dry.   Neurological:      Mental Status: She is alert and oriented to person,  place, and time.      Gait: Gait normal.   Psychiatric:         Mood and Affect: Mood normal.         Behavior: Behavior normal.                  This office visit note was generated in part with the use of [employer] and/or voice recognition dictation. Answers submitted by the patient for this visit:

## 2025-03-20 NOTE — ASSESSMENT & PLAN NOTE
- Iron levels are on the lower end of normal  - Advised to continue taking iron supplement two to three times a week  - Include iron-rich foods in diet (beans, red meat, leafy greens

## 2025-03-24 DIAGNOSIS — Z30.41 ORAL CONTRACEPTIVE PILL SURVEILLANCE: Primary | ICD-10-CM

## 2025-03-24 NOTE — TELEPHONE ENCOUNTER
Patient called to advise she received notice from ins that Slynd was approved.  She is requesting it be reordered.

## 2025-06-16 DIAGNOSIS — F33.1 MODERATE RECURRENT MAJOR DEPRESSION (HCC): ICD-10-CM

## 2025-06-16 RX ORDER — FLUOXETINE 10 MG/1
10 TABLET, FILM COATED ORAL DAILY
Qty: 30 TABLET | Refills: 2 | Status: SHIPPED | OUTPATIENT
Start: 2025-06-16

## 2025-06-19 ENCOUNTER — OFFICE VISIT (OUTPATIENT)
Dept: OBGYN CLINIC | Facility: CLINIC | Age: 20
End: 2025-06-19
Payer: COMMERCIAL

## 2025-06-19 VITALS
DIASTOLIC BLOOD PRESSURE: 60 MMHG | WEIGHT: 115 LBS | BODY MASS INDEX: 23.18 KG/M2 | HEIGHT: 59 IN | SYSTOLIC BLOOD PRESSURE: 102 MMHG

## 2025-06-19 DIAGNOSIS — Z30.41 ORAL CONTRACEPTIVE PILL SURVEILLANCE: ICD-10-CM

## 2025-06-19 PROCEDURE — 99213 OFFICE O/P EST LOW 20 MIN: CPT

## 2025-06-19 NOTE — PROGRESS NOTES
"- She would like to remain on Slynd OCP at the current time, she has been feeling well on this medication.   - She is to continue Prozac 10mg QD      ASSESSMENT/PLAN:    Encounter Diagnosis     ICD-10-CM    1. Oral contraceptive pill surveillance  Z30.41 Drospirenone 4 MG TABS          SUBJECTIVE/HPI:      Patient ID: Sharonda Kaplan 2005      Sharonda Kaplan is a 19 y.o.  presenting to the office for medication f/u. She started taking Slynd OCP 3/13/25 after having irregular menses occurring q 2 weeks on Micronor. She has been feeling well on Slynd noting that periods have become more regular and she is no longer having 2 periods per month. She feels bleeding has lightened. She does still have cramping but feels this has improved. She recently started on Prozac and feels the combination of the two medications have been beneficial for mood regulation.     HISTORY:    Problem List[1]    Allergies[2]    Current Medications[3]    OB History          1    Para   1    Term   1       0    AB   0    Living   1         SAB   0    IAB   0    Ectopic   0    Multiple   0    Live Births   1                 Past Medical History[4]     Past Surgical History[5]     Family History[6]     OBJECTIVE:    Visit Vitals  /60   Ht 4' 11\" (1.499 m)   Wt 52.2 kg (115 lb)   LMP 2025 (Exact Date)   BMI 23.23 kg/m²   OB Status Having periods   Smoking Status Never   BSA 1.46 m²         Physical Exam  Constitutional:       Appearance: Normal appearance.   HENT:      Head: Normocephalic and atraumatic.     Cardiovascular:      Rate and Rhythm: Normal rate and regular rhythm.      Heart sounds: Normal heart sounds. No murmur heard.  Pulmonary:      Effort: Pulmonary effort is normal.      Breath sounds: Normal breath sounds.   Abdominal:      Palpations: Abdomen is soft. There is no mass.      Tenderness: There is no abdominal tenderness.     Neurological:      General: No focal deficit present.      " Mental Status: She is alert and oriented to person, place, and time.     Psychiatric:         Mood and Affect: Mood normal.         Behavior: Behavior normal.   Vitals and nursing note reviewed.               I have spent a total time of 20 minutes in caring for this patient on the day of the visit/encounter including Instructions for management, Patient and family education, Importance of tx compliance, Documenting in the medical record, Reviewing/placing orders in the medical record (including tests, medications, and/or procedures), and Obtaining or reviewing history  .            [1]   Patient Active Problem List  Diagnosis    Chronic constipation    Tonsillolith    Moderate recurrent major depression (HCC)    Anxiety    Suicidal behavior without attempted self-injury    History of postpartum hemorrhage    Elevated alkaline phosphatase level    Vitamin D deficiency    Iron deficiency   [2] No Known Allergies  [3]   Current Outpatient Medications:     Calcium Carbonate (CALCIUM 500 PO), Take by mouth, Disp: , Rfl:     Cholecalciferol (VITAMIN D3) 1,000 units tablet, Take 1,000 Units by mouth in the morning., Disp: , Rfl:     CRANBERRY PO, Take 1 tablet by mouth in the morning, Disp: , Rfl:     Drospirenone 4 MG TABS, Take 1 tablet by mouth daily, Disp: 84 tablet, Rfl: 3    FLUoxetine 10 MG tablet, TAKE 1 TABLET(10 MG) BY MOUTH DAILY, Disp: 30 tablet, Rfl: 2    Iron, Ferrous Sulfate, 325 (65 Fe) MG TABS, Take by mouth [place], Disp: , Rfl:     valACYclovir (VALTREX) 500 mg tablet, Take 2 tablets (1,000 mg total) by mouth daily, Disp: 56 tablet, Rfl: 2  [4]   Past Medical History:  Diagnosis Date    Abnormal body odor     Anemia     Anxiety     None    Constipation     Depression     Dysmenorrhea     Herpes     Hirsutism     Migraine     Ovarian cyst     Sleep apnea of     [5] No past surgical history on file.  [6]   Family History  Problem Relation Name Age of Onset    Drug abuse Mother Lorena      Alcohol abuse Mother Lorena     Substance Abuse Mother Lorena     Mental illness Mother Lorena     Depression Mother Lorena     Drug abuse Father Wes     Alcohol abuse Father Wes     Substance Abuse Father Wes     Substance Abuse Maternal Grandmother Edna     Diabetes Maternal Grandmother Edna     Suicide Attempts Maternal Grandmother Edna     Diabetes Maternal Grandfather Unknown     No Known Problems Paternal Grandmother      Diabetes Paternal Grandfather Unkown     Schizoaffective Disorder  Maternal Uncle Un[place]     Diabetes Maternal Uncle Un[place]     Tourette syndrome Half-Sister Zaina     No Known Problems Half-Sister Zinai     No Known Problems Half-Sister Reyna     No Known Problems Half-Brother Austin     No Known Problems Half-Brother Joel     No Known Problems Half-Brother Karrem     Depression Sister Katheren     Heart attack Neg Hx      Stroke Neg Hx      Cancer Neg Hx

## 2025-07-08 ENCOUNTER — APPOINTMENT (OUTPATIENT)
Dept: LAB | Facility: HOSPITAL | Age: 20
End: 2025-07-08
Attending: FAMILY MEDICINE
Payer: COMMERCIAL

## 2025-07-08 DIAGNOSIS — D72.819 LEUKOPENIA, UNSPECIFIED TYPE: ICD-10-CM

## 2025-07-08 LAB
BASOPHILS # BLD AUTO: 0.01 THOUSANDS/ÂΜL (ref 0–0.1)
BASOPHILS NFR BLD AUTO: 0 % (ref 0–1)
EOSINOPHIL # BLD AUTO: 0.05 THOUSAND/ÂΜL (ref 0–0.61)
EOSINOPHIL NFR BLD AUTO: 1 % (ref 0–6)
ERYTHROCYTE [DISTWIDTH] IN BLOOD BY AUTOMATED COUNT: 11.8 % (ref 11.6–15.1)
HCT VFR BLD AUTO: 35.3 % (ref 34.8–46.1)
HGB BLD-MCNC: 11.6 G/DL (ref 11.5–15.4)
IMM GRANULOCYTES # BLD AUTO: 0.01 THOUSAND/UL (ref 0–0.2)
IMM GRANULOCYTES NFR BLD AUTO: 0 % (ref 0–2)
LYMPHOCYTES # BLD AUTO: 1.7 THOUSANDS/ÂΜL (ref 0.6–4.47)
LYMPHOCYTES NFR BLD AUTO: 33 % (ref 14–44)
MCH RBC QN AUTO: 29.6 PG (ref 26.8–34.3)
MCHC RBC AUTO-ENTMCNC: 32.9 G/DL (ref 31.4–37.4)
MCV RBC AUTO: 90 FL (ref 82–98)
MONOCYTES # BLD AUTO: 0.25 THOUSAND/ÂΜL (ref 0.17–1.22)
MONOCYTES NFR BLD AUTO: 5 % (ref 4–12)
NEUTROPHILS # BLD AUTO: 3.11 THOUSANDS/ÂΜL (ref 1.85–7.62)
NEUTS SEG NFR BLD AUTO: 61 % (ref 43–75)
NRBC BLD AUTO-RTO: 0 /100 WBCS
PLATELET # BLD AUTO: 360 THOUSANDS/UL (ref 149–390)
PMV BLD AUTO: 9.2 FL (ref 8.9–12.7)
RBC # BLD AUTO: 3.92 MILLION/UL (ref 3.81–5.12)
WBC # BLD AUTO: 5.13 THOUSAND/UL (ref 4.31–10.16)

## 2025-07-08 PROCEDURE — 85025 COMPLETE CBC W/AUTO DIFF WBC: CPT

## 2025-07-08 PROCEDURE — 36415 COLL VENOUS BLD VENIPUNCTURE: CPT

## 2025-07-14 ENCOUNTER — TELEPHONE (OUTPATIENT)
Dept: FAMILY MEDICINE CLINIC | Facility: CLINIC | Age: 20
End: 2025-07-14

## 2025-07-14 NOTE — TELEPHONE ENCOUNTER
Spoke with patient. Patient notified they are not due for physical and will have to pay out of pocket if they need one. Patient states she will check with employer if new physical is needed or if March physical can be used. If not needed, patient can schedule nurse visit for TB test. Patient will also be checking if paperwork needs to be filled out.

## 2025-07-24 ENCOUNTER — TELEMEDICINE (OUTPATIENT)
Dept: FAMILY MEDICINE CLINIC | Facility: CLINIC | Age: 20
End: 2025-07-24
Payer: COMMERCIAL

## 2025-07-24 ENCOUNTER — TELEPHONE (OUTPATIENT)
Dept: FAMILY MEDICINE CLINIC | Facility: CLINIC | Age: 20
End: 2025-07-24

## 2025-07-24 DIAGNOSIS — A60.00 RECURRENT GENITAL HERPES: ICD-10-CM

## 2025-07-24 DIAGNOSIS — G47.9 SLEEP DIFFICULTIES: ICD-10-CM

## 2025-07-24 DIAGNOSIS — F33.1 MODERATE RECURRENT MAJOR DEPRESSION (HCC): Primary | ICD-10-CM

## 2025-07-24 PROCEDURE — 99214 OFFICE O/P EST MOD 30 MIN: CPT | Performed by: FAMILY MEDICINE

## 2025-07-24 RX ORDER — FLUOXETINE 10 MG/1
10 TABLET, FILM COATED ORAL DAILY
Qty: 90 TABLET | Refills: 1 | Status: SHIPPED | OUTPATIENT
Start: 2025-07-24

## 2025-07-24 RX ORDER — HYDROXYZINE HYDROCHLORIDE 10 MG/1
10-20 TABLET, FILM COATED ORAL
Qty: 30 TABLET | Refills: 0 | Status: SHIPPED | OUTPATIENT
Start: 2025-07-24

## 2025-07-24 RX ORDER — VALACYCLOVIR HYDROCHLORIDE 500 MG/1
TABLET, FILM COATED ORAL
Qty: 60 TABLET | Refills: 1 | Status: SHIPPED | OUTPATIENT
Start: 2025-07-24 | End: 2026-07-24

## 2025-07-24 NOTE — ASSESSMENT & PLAN NOTE
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  (Baby does not sleep in her bed, reviewed safe sleep)  F/u in three months, sooner if needed    Orders:  •  FLUoxetine 10 MG tablet; Take 1 tablet (10 mg total) by mouth daily

## 2025-07-24 NOTE — PROGRESS NOTES
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  (Baby does not sleep in her bed, reviewed safe sleep)  F/u in three months, sooner if needed    Orders:  •  FLUoxetine 10 MG tablet; Take 1 tablet (10 mg total) by mouth daily    Recurrent genital herpes  Refill valtrex  Orders:  •  valACYclovir (VALTREX) 500 mg tablet; Take two tabs PO daily x 5 days as needed for herpes outbreak    Sleep difficulties  Start prn hydroxyzine as noted above  Orders:  •  hydrOXYzine HCL (ATARAX) 10 mg tablet; Take 1-2 tablets (10-20 mg total) by mouth daily at bedtime as needed (sleep)          History of Present Illness     Pt is here for f/u visit for depression  She feels much better since starting on prozac 10 mg daily  Feels happier, in control, capable of parenting well    Only concern is that she someitmes has trouble sleeping  Baby is sleeping through the night- does not co-sleep.  Pt goes to bed sometimes around 8 but will stay awake a few hours.  Doesn't feel worried or stressed at night    Keeping active.   Enjoying baby    Needs refill on prn valtrex- uses just for outbreaks        Review of Systems    Objective   There were no vitals taken for this visit.    Physical Exam  Constitutional:       Appearance: Normal appearance.   Pulmonary:      Effort: No respiratory distress.     Neurological:      Mental Status: She is alert.     Psychiatric:         Mood and Affect: Mood normal.         Behavior: Behavior normal.      Comments: Bright, happy affect         Administrative Statements   Encounter provider Kaylan Camacho MD    The Patient is  located at Home and in the following state in which I hold an active license [place].    The patient was identified by name and date of birth. Karrijose EDU AugustinRosaura was informed that this is a telemedicine visit and that the visit is being conducted through the Epic Embedded platform. She agrees to proceed..  My office door was closed. No one else was in the room.  She acknowledged consent and understanding of privacy and security of the video platform. The patient has agreed to participate and understands they can discontinue the visit at any time.    I have spent a total time of 15 minutes in caring for this patient on the day of the visit/encounter including Risks and benefits of tx options, Instructions for management, Patient and family education, Documenting in the medical record, Reviewing/placing orders in the medical record (including tests, medications, and/or procedures), and Obtaining or reviewing history  , not including the time spent for establishing the audio/video connection.